# Patient Record
Sex: MALE | Race: WHITE | NOT HISPANIC OR LATINO | Employment: FULL TIME
[De-identification: names, ages, dates, MRNs, and addresses within clinical notes are randomized per-mention and may not be internally consistent; named-entity substitution may affect disease eponyms.]

---

## 2017-01-10 RX ORDER — ATORVASTATIN CALCIUM 10 MG/1
TABLET, FILM COATED ORAL
Qty: 20 TABLET | Refills: 0 | Status: SHIPPED | OUTPATIENT
Start: 2017-01-10

## 2017-01-10 NOTE — TELEPHONE ENCOUNTER
Cholesterol Medications  Protocol Criteria:  · Appointment scheduled in the past 12 months or in the next 3 months  · ALT & LDL on file in the past 12 months  · ALT result < 80  · LDL result <130   Recent Visits       Provider Department Primary Dx    8 mo

## 2017-01-16 ENCOUNTER — PRIOR ORIGINAL RECORDS (OUTPATIENT)
Dept: OTHER | Age: 62
End: 2017-01-16

## 2017-03-03 ENCOUNTER — OFFICE VISIT (OUTPATIENT)
Dept: FAMILY MEDICINE CLINIC | Facility: CLINIC | Age: 62
End: 2017-03-03

## 2017-03-03 VITALS
HEART RATE: 82 BPM | BODY MASS INDEX: 25.73 KG/M2 | RESPIRATION RATE: 20 BRPM | OXYGEN SATURATION: 98 % | TEMPERATURE: 98 F | WEIGHT: 190 LBS | DIASTOLIC BLOOD PRESSURE: 70 MMHG | HEIGHT: 72 IN | SYSTOLIC BLOOD PRESSURE: 110 MMHG

## 2017-03-03 DIAGNOSIS — J01.00 ACUTE MAXILLARY SINUSITIS, RECURRENCE NOT SPECIFIED: Primary | ICD-10-CM

## 2017-03-03 PROCEDURE — 99213 OFFICE O/P EST LOW 20 MIN: CPT | Performed by: NURSE PRACTITIONER

## 2017-03-03 RX ORDER — DOXYCYCLINE HYCLATE 100 MG/1
100 CAPSULE ORAL 2 TIMES DAILY
Qty: 20 CAPSULE | Refills: 0 | Status: SHIPPED | OUTPATIENT
Start: 2017-03-03 | End: 2017-03-13

## 2017-03-03 NOTE — PROGRESS NOTES
CHIEF COMPLAINT:   Patient presents with:  Sinus Problem      HPI:   Yolande Shone is a 59year old male who presents for cold symptoms for  2  weeks. Symptoms have progressed into sinus congestion and been worsening since onset.  Sinus congestion/pain is lesions  HEAD: atraumatic, normocephalic, +  tenderness on palpation of maxillary sinuses  EYES: conjunctiva clear, EOM intact  EARS: TM's Pearly grey bilaterally.   NOSE: nostrils patent, scacnt nasal mucous, nasal mucosa reddened and swollen bilateral tur plan.  The patient is asked to return if sx's persist or worsen.

## 2017-03-04 ENCOUNTER — TELEPHONE (OUTPATIENT)
Dept: FAMILY MEDICINE CLINIC | Facility: CLINIC | Age: 62
End: 2017-03-04

## 2017-03-04 RX ORDER — LEVOFLOXACIN 750 MG/1
750 TABLET ORAL DAILY
Qty: 5 TABLET | Refills: 0 | Status: SHIPPED | OUTPATIENT
Start: 2017-03-04 | End: 2017-03-04

## 2017-03-04 RX ORDER — LEVOFLOXACIN 750 MG/1
750 TABLET ORAL DAILY
Qty: 5 TABLET | Refills: 0 | Status: ON HOLD | OUTPATIENT
Start: 2017-03-04 | End: 2018-07-25

## 2017-09-24 ENCOUNTER — HOSPITAL (OUTPATIENT)
Dept: OTHER | Age: 62
End: 2017-09-24
Attending: EMERGENCY MEDICINE

## 2017-09-28 ENCOUNTER — HOSPITAL (OUTPATIENT)
Dept: OTHER | Age: 62
End: 2017-09-28
Attending: INTERNAL MEDICINE

## 2017-09-28 ENCOUNTER — PRIOR ORIGINAL RECORDS (OUTPATIENT)
Dept: OTHER | Age: 62
End: 2017-09-28

## 2017-09-28 LAB
ALBUMIN SERPL-MCNC: 3.9 GM/DL (ref 3.6–5.1)
ALBUMIN/GLOB SERPL: 1.3 {RATIO} (ref 1–2.4)
ALBUMIN: 3.9 G/DL
ALKALINE PHOSPHATATE(ALK PHOS): 74 IU/L
ALP SERPL-CCNC: 74 UNIT/L (ref 45–117)
ALT (SGPT): 49 U/L
ALT SERPL-CCNC: 49 UNIT/L
ANION GAP SERPL CALC-SCNC: 13 MMOL/L (ref 10–20)
AST (SGOT): 20 U/L
AST SERPL-CCNC: 20 UNIT/L
BILIRUB SERPL-MCNC: 2.2 MG/DL (ref 0.2–1)
BILIRUBIN TOTAL: 2.2 MG/DL
BUN SERPL-MCNC: 16 MG/DL (ref 6–20)
BUN/CREAT SERPL: 19 (ref 7–25)
BUN: 16 MG/DL
CALCIUM SERPL-MCNC: 8.9 MG/DL (ref 8.4–10.2)
CALCIUM: 8.9 MG/DL
CHLORIDE: 106 MEQ/L
CHLORIDE: 106 MMOL/L (ref 98–107)
CHOLEST SERPL-MCNC: 131 MG/DL
CHOLEST/HDLC SERPL: 2.3 {RATIO}
CHOLESTEROL, TOTAL: 131 MG/DL
CO2 SERPL-SCNC: 30 MMOL/L (ref 21–32)
CREAT SERPL-MCNC: 0.84 MG/DL (ref 0.67–1.17)
CREATININE, SERUM: 0.84 MG/DL
GLOBULIN SER-MCNC: 3.1 GM/DL (ref 2–4)
GLOBULIN: 3.1 G/DL
GLUCOSE SERPL-MCNC: 107 MG/DL (ref 65–99)
GLUCOSE: 107 MG/DL
GLUCOSE: 107 MG/DL
HDL CHOLESTEROL: 57 MG/DL
HDLC SERPL-MCNC: 57 MG/DL
LDL CHOLESTEROL: 50 MG/DL
LDLC SERPL CALC-MCNC: 50 MG/DL
LENGTH OF FAST TIME PATIENT: ABNORMAL HR
LENGTH OF FAST TIME PATIENT: NORMAL HR
NONHDLC SERPL-MCNC: 74 MG/DL
POTASSIUM SERPL-SCNC: 4.1 MMOL/L (ref 3.4–5.1)
POTASSIUM, SERUM: 4.1 MEQ/L
PROT SERPL-MCNC: 7 GM/DL (ref 6.4–8.2)
PROTEIN, TOTAL: 7 G/DL
SGOT (AST): 20 IU/L
SGPT (ALT): 49 IU/L
SODIUM SERPL-SCNC: 145 MMOL/L (ref 135–145)
SODIUM: 145 MEQ/L
TRIGLYCERIDE (TRIGP): 120 MG/DL
TRIGLYCERIDES: 120 MG/DL

## 2017-10-11 ENCOUNTER — PRIOR ORIGINAL RECORDS (OUTPATIENT)
Dept: OTHER | Age: 62
End: 2017-10-11

## 2017-11-30 ENCOUNTER — OFFICE VISIT (OUTPATIENT)
Dept: FAMILY MEDICINE CLINIC | Facility: CLINIC | Age: 62
End: 2017-11-30

## 2017-11-30 VITALS
TEMPERATURE: 98 F | HEIGHT: 72 IN | HEART RATE: 86 BPM | RESPIRATION RATE: 20 BRPM | WEIGHT: 195 LBS | BODY MASS INDEX: 26.41 KG/M2 | OXYGEN SATURATION: 98 % | SYSTOLIC BLOOD PRESSURE: 128 MMHG | DIASTOLIC BLOOD PRESSURE: 84 MMHG

## 2017-11-30 DIAGNOSIS — J40 BRONCHITIS: ICD-10-CM

## 2017-11-30 DIAGNOSIS — J01.00 ACUTE MAXILLARY SINUSITIS, RECURRENCE NOT SPECIFIED: Primary | ICD-10-CM

## 2017-11-30 PROCEDURE — 99213 OFFICE O/P EST LOW 20 MIN: CPT | Performed by: NURSE PRACTITIONER

## 2017-11-30 RX ORDER — BENZONATATE 200 MG/1
200 CAPSULE ORAL 3 TIMES DAILY PRN
Qty: 20 CAPSULE | Refills: 0 | Status: ON HOLD | OUTPATIENT
Start: 2017-11-30 | End: 2018-07-25

## 2017-11-30 RX ORDER — ALBUTEROL SULFATE 90 UG/1
AEROSOL, METERED RESPIRATORY (INHALATION)
Qty: 1 INHALER | Refills: 0 | Status: SHIPPED | OUTPATIENT
Start: 2017-11-30 | End: 2019-03-22

## 2017-11-30 RX ORDER — DOXYCYCLINE HYCLATE 100 MG
100 TABLET ORAL 2 TIMES DAILY
Qty: 20 TABLET | Refills: 0 | Status: SHIPPED | OUTPATIENT
Start: 2017-11-30 | End: 2018-06-12

## 2017-11-30 NOTE — PROGRESS NOTES
CHIEF COMPLAINT:   Patient presents with:  Sinus Problem      HPI:   Anival Blanton is a 59year old male who presents for cold symptoms for  10  days. Symptoms have progressed into sinus congestion and been worsening since onset.  Sinus congestion/pain is Glasses of wine: 1 per week     Comment: beer and wine socially        REVIEW OF SYSTEMS:   GENERAL:  Denies loss of appetite  SKIN: no rashes or abnormal skin lesions  HEENT: See HPI.     LUNGS: denies shortness of breath or wheezing, See HPI  CARDIOVASCUL 0      Si puffs every 4-6 hours as needed      Doxycycline Hyclate 100 MG Oral Tab 20 tablet 0      Sig: Take 1 tablet (100 mg total) by mouth 2 (two) times daily. Risks, benefits, side effects of medication addressed and explained.     There

## 2017-12-12 ENCOUNTER — TELEPHONE (OUTPATIENT)
Dept: FAMILY MEDICINE CLINIC | Facility: CLINIC | Age: 62
End: 2017-12-12

## 2017-12-12 RX ORDER — LEVOFLOXACIN 500 MG/1
500 TABLET, FILM COATED ORAL DAILY
Qty: 10 TABLET | Refills: 0 | Status: SHIPPED | OUTPATIENT
Start: 2017-12-12 | End: 2017-12-22

## 2017-12-12 NOTE — TELEPHONE ENCOUNTER
Called and relates that after completing Doxycycline for Sinusitis. Leonel di not help . Requested Levaquin. Will order.   Instructed that if this doesn't help to see PCM

## 2018-06-02 ENCOUNTER — OFFICE VISIT (OUTPATIENT)
Dept: FAMILY MEDICINE CLINIC | Facility: CLINIC | Age: 63
End: 2018-06-02

## 2018-06-02 VITALS
DIASTOLIC BLOOD PRESSURE: 86 MMHG | RESPIRATION RATE: 20 BRPM | HEIGHT: 72 IN | WEIGHT: 195 LBS | HEART RATE: 84 BPM | OXYGEN SATURATION: 98 % | BODY MASS INDEX: 26.41 KG/M2 | SYSTOLIC BLOOD PRESSURE: 136 MMHG | TEMPERATURE: 98 F

## 2018-06-02 DIAGNOSIS — J01.00 ACUTE MAXILLARY SINUSITIS, RECURRENCE NOT SPECIFIED: Primary | ICD-10-CM

## 2018-06-02 PROBLEM — J01.90 ACUTE SINUSITIS: Status: ACTIVE | Noted: 2018-06-02

## 2018-06-02 PROCEDURE — 99213 OFFICE O/P EST LOW 20 MIN: CPT | Performed by: NURSE PRACTITIONER

## 2018-06-02 RX ORDER — AZITHROMYCIN 250 MG/1
250 TABLET, FILM COATED ORAL DAILY
Qty: 6 TABLET | Refills: 0 | Status: SHIPPED | OUTPATIENT
Start: 2018-06-02 | End: 2018-06-12

## 2018-06-02 NOTE — PROGRESS NOTES
CHIEF COMPLAINT:   Patient presents with:  Sinus Problem      HPI:   Nehal Oconnor is a 57year old male who presents for cold symptoms for  10  days. Symptoms have progressed into sinus congestion and been worsening since onset.  Sinus congestion/pain is oz/week     Glasses of wine: 1 per week     Comment: beer and wine socially        REVIEW OF SYSTEMS:   GENERAL:  denies  diminished appetite  SKIN: no rashes or abnormal skin lesions  HEENT: See HPI.     LUNGS: denies shortness of breath or wheezing, See H understanding of these issues and agrees to the plan. The patient is asked to f/u with PCP if sx's persist or worsen.

## 2018-06-09 ENCOUNTER — TELEPHONE (OUTPATIENT)
Dept: FAMILY MEDICINE CLINIC | Facility: CLINIC | Age: 63
End: 2018-06-09

## 2018-06-09 NOTE — TELEPHONE ENCOUNTER
Pt requesting 2nd round of abx: explained to pateint that abx given has long half life and at this time is too early for another abx. Pt agrees with this plan.

## 2018-06-11 ENCOUNTER — TELEPHONE (OUTPATIENT)
Dept: FAMILY MEDICINE CLINIC | Facility: CLINIC | Age: 63
End: 2018-06-11

## 2018-06-11 RX ORDER — AZITHROMYCIN 250 MG/1
250 TABLET, FILM COATED ORAL DAILY
Qty: 6 TABLET | Refills: 0 | Status: SHIPPED | OUTPATIENT
Start: 2018-06-11 | End: 2018-06-12

## 2018-06-11 NOTE — TELEPHONE ENCOUNTER
Came by and relates that although the sinus congestion improved that symptoms returned. Assured that it sometimes (in the past) take (2) doses of Azithromycin. Will comply. Encouraged to see [facility].

## 2018-06-12 ENCOUNTER — OFFICE VISIT (OUTPATIENT)
Dept: FAMILY MEDICINE CLINIC | Facility: CLINIC | Age: 63
End: 2018-06-12

## 2018-06-12 VITALS
DIASTOLIC BLOOD PRESSURE: 81 MMHG | SYSTOLIC BLOOD PRESSURE: 129 MMHG | TEMPERATURE: 98 F | HEART RATE: 85 BPM | WEIGHT: 199 LBS | BODY MASS INDEX: 27 KG/M2

## 2018-06-12 DIAGNOSIS — J01.01 ACUTE RECURRENT MAXILLARY SINUSITIS: Primary | ICD-10-CM

## 2018-06-12 PROCEDURE — 99212 OFFICE O/P EST SF 10 MIN: CPT | Performed by: FAMILY MEDICINE

## 2018-06-12 PROCEDURE — 99213 OFFICE O/P EST LOW 20 MIN: CPT | Performed by: FAMILY MEDICINE

## 2018-06-12 RX ORDER — METHYLPREDNISOLONE 4 MG/1
TABLET ORAL
Qty: 1 PACKAGE | Refills: 0 | Status: ON HOLD | OUTPATIENT
Start: 2018-06-12 | End: 2018-07-25

## 2018-06-12 NOTE — PROGRESS NOTES
HPI:    Patient ID: Catrina Bangura is a 57year old male. HPI  Patient presents with:  Sinusitis: sinus, meds given relieves sinus, comes back after done with meds, turns into cough, with head pressure       Review of Systems   Constitutional: Negative. recurrent sinusitis. No orders of the defined types were placed in this encounter. Meds This Visit:  Signed Prescriptions Disp Refills    methylPREDNISolone (MEDROL) 4 MG Oral Tablet Therapy Pack 1 Package 0      Sig: As directed.            Imaging &

## 2018-06-22 ENCOUNTER — NURSE TRIAGE (OUTPATIENT)
Dept: OTHER | Age: 63
End: 2018-06-22

## 2018-06-22 ENCOUNTER — OFFICE VISIT (OUTPATIENT)
Dept: FAMILY MEDICINE CLINIC | Facility: CLINIC | Age: 63
End: 2018-06-22

## 2018-06-22 VITALS
TEMPERATURE: 98 F | DIASTOLIC BLOOD PRESSURE: 79 MMHG | BODY MASS INDEX: 27 KG/M2 | HEART RATE: 83 BPM | WEIGHT: 199 LBS | SYSTOLIC BLOOD PRESSURE: 128 MMHG

## 2018-06-22 DIAGNOSIS — R31.0 GROSS HEMATURIA: ICD-10-CM

## 2018-06-22 DIAGNOSIS — R35.0 FREQUENCY OF URINATION: Primary | ICD-10-CM

## 2018-06-22 DIAGNOSIS — Z12.5 PROSTATE CANCER SCREENING: ICD-10-CM

## 2018-06-22 PROCEDURE — 99212 OFFICE O/P EST SF 10 MIN: CPT | Performed by: FAMILY MEDICINE

## 2018-06-22 PROCEDURE — 81002 URINALYSIS NONAUTO W/O SCOPE: CPT | Performed by: FAMILY MEDICINE

## 2018-06-22 PROCEDURE — 99214 OFFICE O/P EST MOD 30 MIN: CPT | Performed by: FAMILY MEDICINE

## 2018-06-22 RX ORDER — CIPROFLOXACIN 500 MG/1
500 TABLET, FILM COATED ORAL 2 TIMES DAILY
Qty: 14 TABLET | Refills: 0 | Status: ON HOLD | OUTPATIENT
Start: 2018-06-22 | End: 2018-07-25

## 2018-06-22 NOTE — TELEPHONE ENCOUNTER
Action Requested: Summary for Provider     []  Critical Lab, Recommendations Needed  [] Need Additional Advice  []   FYI    []   Need Orders  [] Need Medications Sent to Pharmacy  []  Other     SUMMARY: appt scheduled today. Onset this morning.  Pt woke up

## 2018-06-22 NOTE — PROGRESS NOTES
HPI:    Patient ID: Nehal Oconnor is a 57year old male. HPI  Patient presents with:  Bleeding: blood in urine, frequent urination, started this morning   No significant pain with urination. No back or abdominal pain.   Review of Systems   Constitutiona urination  (primary encounter diagnosis)  Gross hematuria  Prostate cancer screening    Will send for culture and emperic treatment with cipro.   He will schedule a urogram.     Orders Placed This Encounter      POC Urinalysis, Manual Dip without microscopy

## 2018-06-28 ENCOUNTER — LAB ENCOUNTER (OUTPATIENT)
Dept: LAB | Facility: HOSPITAL | Age: 63
End: 2018-06-28
Attending: FAMILY MEDICINE
Payer: COMMERCIAL

## 2018-06-28 ENCOUNTER — HOSPITAL ENCOUNTER (OUTPATIENT)
Dept: CT IMAGING | Facility: HOSPITAL | Age: 63
Discharge: HOME | End: 2018-06-28
Attending: FAMILY MEDICINE
Payer: COMMERCIAL

## 2018-06-28 DIAGNOSIS — R31.0 GROSS HEMATURIA: ICD-10-CM

## 2018-06-28 DIAGNOSIS — Z12.5 PROSTATE CANCER SCREENING: ICD-10-CM

## 2018-06-28 LAB — CREAT BLD-MCNC: 0.9 MG/DL (ref 0.5–1.5)

## 2018-06-28 PROCEDURE — 36415 COLL VENOUS BLD VENIPUNCTURE: CPT

## 2018-06-28 PROCEDURE — 85025 COMPLETE CBC W/AUTO DIFF WBC: CPT

## 2018-06-28 PROCEDURE — 80048 BASIC METABOLIC PNL TOTAL CA: CPT

## 2018-06-28 PROCEDURE — 82565 ASSAY OF CREATININE: CPT

## 2018-06-28 PROCEDURE — 74178 CT ABD&PLV WO CNTR FLWD CNTR: CPT | Performed by: FAMILY MEDICINE

## 2018-07-25 ENCOUNTER — EXTERNAL RECORD (OUTPATIENT)
Dept: HEALTH INFORMATION MANAGEMENT | Facility: OTHER | Age: 63
End: 2018-07-25

## 2018-07-25 ENCOUNTER — APPOINTMENT (OUTPATIENT)
Dept: MRI IMAGING | Facility: HOSPITAL | Age: 63
End: 2018-07-25
Attending: EMERGENCY MEDICINE
Payer: COMMERCIAL

## 2018-07-25 ENCOUNTER — HOSPITAL ENCOUNTER (OUTPATIENT)
Facility: HOSPITAL | Age: 63
Setting detail: OBSERVATION
Discharge: HOME | End: 2018-07-26
Attending: EMERGENCY MEDICINE | Admitting: HOSPITALIST
Payer: COMMERCIAL

## 2018-07-25 DIAGNOSIS — G45.4 TGA (TRANSIENT GLOBAL AMNESIA): Primary | ICD-10-CM

## 2018-07-25 LAB
ANION GAP SERPL CALC-SCNC: 6 MMOL/L (ref 0–18)
BASOPHILS # BLD: 0 K/UL (ref 0–0.2)
BASOPHILS NFR BLD: 0 %
BILIRUB UR QL: NEGATIVE
BUN SERPL-MCNC: 16 MG/DL (ref 8–20)
BUN/CREAT SERPL: 16.5 (ref 10–20)
CALCIUM SERPL-MCNC: 9 MG/DL (ref 8.5–10.5)
CHLORIDE SERPL-SCNC: 112 MMOL/L (ref 95–110)
CLARITY UR: CLEAR
CO2 SERPL-SCNC: 25 MMOL/L (ref 22–32)
COLOR UR: YELLOW
CREAT SERPL-MCNC: 0.97 MG/DL (ref 0.5–1.5)
EOSINOPHIL # BLD: 0.1 K/UL (ref 0–0.7)
EOSINOPHIL NFR BLD: 1 %
ERYTHROCYTE [DISTWIDTH] IN BLOOD BY AUTOMATED COUNT: 14.3 % (ref 11–15)
GLUCOSE SERPL-MCNC: 101 MG/DL (ref 70–99)
GLUCOSE UR-MCNC: NEGATIVE MG/DL
HCT VFR BLD AUTO: 44.2 % (ref 41–52)
HGB BLD-MCNC: 15.3 G/DL (ref 13.5–17.5)
KETONES UR-MCNC: NEGATIVE MG/DL
LEUKOCYTE ESTERASE UR QL STRIP.AUTO: NEGATIVE
LYMPHOCYTES # BLD: 1.5 K/UL (ref 1–4)
LYMPHOCYTES NFR BLD: 20 %
MCH RBC QN AUTO: 31.9 PG (ref 27–32)
MCHC RBC AUTO-ENTMCNC: 34.7 G/DL (ref 32–37)
MCV RBC AUTO: 91.9 FL (ref 80–100)
MONOCYTES # BLD: 0.6 K/UL (ref 0–1)
MONOCYTES NFR BLD: 9 %
NEUTROPHILS # BLD AUTO: 5.2 K/UL (ref 1.8–7.7)
NEUTROPHILS NFR BLD: 70 %
NITRITE UR QL STRIP.AUTO: NEGATIVE
OSMOLALITY UR CALC.SUM OF ELEC: 297 MOSM/KG (ref 275–295)
PH UR: 7 [PH] (ref 5–8)
PLATELET # BLD AUTO: 202 K/UL (ref 140–400)
PMV BLD AUTO: 8.8 FL (ref 7.4–10.3)
POTASSIUM SERPL-SCNC: 3.9 MMOL/L (ref 3.3–5.1)
PROT UR-MCNC: NEGATIVE MG/DL
RBC # BLD AUTO: 4.81 M/UL (ref 4.5–5.9)
RBC #/AREA URNS AUTO: 3 /HPF
SODIUM SERPL-SCNC: 143 MMOL/L (ref 136–144)
SP GR UR STRIP: 1.02 (ref 1–1.03)
UROBILINOGEN UR STRIP-ACNC: <2
VIT C UR-MCNC: NEGATIVE MG/DL
WBC # BLD AUTO: 7.5 K/UL (ref 4–11)
WBC #/AREA URNS AUTO: 2 /HPF

## 2018-07-25 PROCEDURE — 70551 MRI BRAIN STEM W/O DYE: CPT | Performed by: EMERGENCY MEDICINE

## 2018-07-25 PROCEDURE — 99244 OFF/OP CNSLTJ NEW/EST MOD 40: CPT | Performed by: OTHER

## 2018-07-25 PROCEDURE — 99220 INITIAL OBSERVATION CARE,LEVL III: CPT | Performed by: HOSPITALIST

## 2018-07-25 RX ORDER — SENNOSIDES 8.6 MG
17.2 TABLET ORAL NIGHTLY
Status: DISCONTINUED | OUTPATIENT
Start: 2018-07-25 | End: 2018-07-26

## 2018-07-25 RX ORDER — ATORVASTATIN CALCIUM 10 MG/1
10 TABLET, FILM COATED ORAL NIGHTLY
Status: DISCONTINUED | OUTPATIENT
Start: 2018-07-25 | End: 2018-07-26

## 2018-07-25 RX ORDER — ONDANSETRON 2 MG/ML
4 INJECTION INTRAMUSCULAR; INTRAVENOUS EVERY 6 HOURS PRN
Status: DISCONTINUED | OUTPATIENT
Start: 2018-07-25 | End: 2018-07-26

## 2018-07-25 RX ORDER — ZOLPIDEM TARTRATE 5 MG/1
5 TABLET ORAL NIGHTLY PRN
Status: DISCONTINUED | OUTPATIENT
Start: 2018-07-25 | End: 2018-07-26

## 2018-07-25 RX ORDER — DOCUSATE SODIUM 100 MG/1
100 CAPSULE, LIQUID FILLED ORAL 2 TIMES DAILY
Status: DISCONTINUED | OUTPATIENT
Start: 2018-07-25 | End: 2018-07-26

## 2018-07-25 RX ORDER — ONDANSETRON 2 MG/ML
4 INJECTION INTRAMUSCULAR; INTRAVENOUS EVERY 6 HOURS PRN
Status: DISCONTINUED | OUTPATIENT
Start: 2018-07-25 | End: 2018-07-25

## 2018-07-25 RX ORDER — 0.9 % SODIUM CHLORIDE 0.9 %
VIAL (ML) INJECTION
Status: COMPLETED
Start: 2018-07-25 | End: 2018-07-25

## 2018-07-25 RX ORDER — METOCLOPRAMIDE HYDROCHLORIDE 5 MG/ML
10 INJECTION INTRAMUSCULAR; INTRAVENOUS EVERY 8 HOURS PRN
Status: DISCONTINUED | OUTPATIENT
Start: 2018-07-25 | End: 2018-07-26

## 2018-07-25 RX ORDER — OMEPRAZOLE 20 MG/1
20 CAPSULE, DELAYED RELEASE ORAL
COMMUNITY
End: 2019-02-12 | Stop reason: CLARIF

## 2018-07-25 RX ORDER — SODIUM CHLORIDE 0.9 % (FLUSH) 0.9 %
3 SYRINGE (ML) INJECTION AS NEEDED
Status: DISCONTINUED | OUTPATIENT
Start: 2018-07-25 | End: 2018-07-26

## 2018-07-25 RX ORDER — CLOPIDOGREL BISULFATE 75 MG/1
75 TABLET ORAL DAILY
Status: DISCONTINUED | OUTPATIENT
Start: 2018-07-26 | End: 2018-07-26

## 2018-07-25 RX ORDER — ACETAMINOPHEN 325 MG/1
650 TABLET ORAL EVERY 6 HOURS PRN
Status: DISCONTINUED | OUTPATIENT
Start: 2018-07-25 | End: 2018-07-25

## 2018-07-25 RX ORDER — POLYETHYLENE GLYCOL 3350 17 G/17G
17 POWDER, FOR SOLUTION ORAL DAILY PRN
Status: DISCONTINUED | OUTPATIENT
Start: 2018-07-25 | End: 2018-07-26

## 2018-07-25 RX ORDER — ASPIRIN 81 MG/1
162 TABLET ORAL DAILY
Status: ON HOLD | COMMUNITY
End: 2018-07-26

## 2018-07-25 RX ORDER — SODIUM PHOSPHATE, DIBASIC AND SODIUM PHOSPHATE, MONOBASIC 7; 19 G/133ML; G/133ML
1 ENEMA RECTAL ONCE AS NEEDED
Status: DISCONTINUED | OUTPATIENT
Start: 2018-07-25 | End: 2018-07-26

## 2018-07-25 RX ORDER — PANTOPRAZOLE SODIUM 20 MG/1
20 TABLET, DELAYED RELEASE ORAL
Status: DISCONTINUED | OUTPATIENT
Start: 2018-07-26 | End: 2018-07-26

## 2018-07-25 RX ORDER — ACETAMINOPHEN 650 MG/1
650 SUPPOSITORY RECTAL EVERY 4 HOURS PRN
Status: DISCONTINUED | OUTPATIENT
Start: 2018-07-25 | End: 2018-07-26

## 2018-07-25 RX ORDER — CLOPIDOGREL BISULFATE 75 MG/1
75 TABLET ORAL DAILY
Status: DISCONTINUED | OUTPATIENT
Start: 2018-07-25 | End: 2018-07-25

## 2018-07-25 RX ORDER — ACETAMINOPHEN 325 MG/1
650 TABLET ORAL EVERY 4 HOURS PRN
Status: DISCONTINUED | OUTPATIENT
Start: 2018-07-25 | End: 2018-07-26

## 2018-07-25 RX ORDER — BISACODYL 10 MG
10 SUPPOSITORY, RECTAL RECTAL
Status: DISCONTINUED | OUTPATIENT
Start: 2018-07-25 | End: 2018-07-26

## 2018-07-25 NOTE — ED INITIAL ASSESSMENT (HPI)
Pt states he had a period of time this am at work which he doesn't remember. States he sent emails that he doesn't remember sending. Time period from 0093-2465. Symptoms have resolved now.

## 2018-07-25 NOTE — ED PROVIDER NOTES
Patient Seen in: [facility] AND [facility] Emergency Department    History   Patient presents with:  Altered Mental Status (neurologic)    Stated Complaint: confusion this am, symptoms resolved    HPI    73-year-old male presents for evaluation of confusion.   Pa range of motion. Neck supple. Cardiovascular: Normal rate, regular rhythm, normal heart sounds and intact distal pulses. Pulmonary/Chest: Effort normal and breath sounds normal. No respiratory distress. Abdominal: Soft.  Bowel sounds are normal. Ther admitted to Dr. Bia Washington. Patient and wife updated at the bedside.       Disposition and Plan     Clinical Impression:  TGA (transient global amnesia)  (primary encounter diagnosis)    Disposition:  Admit  7/25/2018  5:24 pm    Follow-up:  No follow-up pro

## 2018-07-25 NOTE — ED NOTES
Pt states he has a period of 10-15 minutes he has no recollection of. States about 11:30am, he received replies to emails that he had sent at between 9:00- 9:30am, but did not remember sending. States emails were coherent and made sense.  A co worker told h

## 2018-07-26 ENCOUNTER — PRIOR ORIGINAL RECORDS (OUTPATIENT)
Dept: OTHER | Age: 63
End: 2018-07-26

## 2018-07-26 ENCOUNTER — APPOINTMENT (OUTPATIENT)
Dept: ULTRASOUND IMAGING | Facility: HOSPITAL | Age: 63
End: 2018-07-26
Attending: HOSPITALIST
Payer: COMMERCIAL

## 2018-07-26 ENCOUNTER — APPOINTMENT (OUTPATIENT)
Dept: CV DIAGNOSTICS | Facility: HOSPITAL | Age: 63
End: 2018-07-26
Attending: HOSPITALIST
Payer: COMMERCIAL

## 2018-07-26 VITALS
WEIGHT: 199.69 LBS | HEART RATE: 73 BPM | BODY MASS INDEX: 24 KG/M2 | SYSTOLIC BLOOD PRESSURE: 159 MMHG | OXYGEN SATURATION: 97 % | RESPIRATION RATE: 18 BRPM | DIASTOLIC BLOOD PRESSURE: 95 MMHG | TEMPERATURE: 98 F

## 2018-07-26 LAB
ANION GAP SERPL CALC-SCNC: 6 MMOL/L (ref 0–18)
BASOPHILS # BLD: 0 K/UL (ref 0–0.2)
BASOPHILS NFR BLD: 0 %
BUN SERPL-MCNC: 12 MG/DL (ref 8–20)
BUN/CREAT SERPL: 13.3 (ref 10–20)
CALCIUM SERPL-MCNC: 9 MG/DL (ref 8.5–10.5)
CHLORIDE SERPL-SCNC: 106 MMOL/L (ref 95–110)
CHOLEST SERPL-MCNC: 123 MG/DL (ref 110–200)
CO2 SERPL-SCNC: 28 MMOL/L (ref 22–32)
CREAT SERPL-MCNC: 0.9 MG/DL (ref 0.5–1.5)
EOSINOPHIL # BLD: 0.1 K/UL (ref 0–0.7)
EOSINOPHIL NFR BLD: 1 %
ERYTHROCYTE [DISTWIDTH] IN BLOOD BY AUTOMATED COUNT: 14.1 % (ref 11–15)
GLUCOSE SERPL-MCNC: 112 MG/DL (ref 70–99)
HBA1C MFR BLD: 5.3 % (ref 4–6)
HBA1C MFR BLD: 5.3 % (ref 4–6)
HCT VFR BLD AUTO: 43.7 % (ref 41–52)
HDLC SERPL-MCNC: 49 MG/DL
HGB BLD-MCNC: 15.4 G/DL (ref 13.5–17.5)
LDLC SERPL CALC-MCNC: 56 MG/DL (ref 0–99)
LYMPHOCYTES # BLD: 1.4 K/UL (ref 1–4)
LYMPHOCYTES NFR BLD: 25 %
MCH RBC QN AUTO: 32.4 PG (ref 27–32)
MCHC RBC AUTO-ENTMCNC: 35.2 G/DL (ref 32–37)
MCV RBC AUTO: 92.2 FL (ref 80–100)
MONOCYTES # BLD: 0.5 K/UL (ref 0–1)
MONOCYTES NFR BLD: 9 %
NEUTROPHILS # BLD AUTO: 3.7 K/UL (ref 1.8–7.7)
NEUTROPHILS NFR BLD: 64 %
NONHDLC SERPL-MCNC: 74 MG/DL
OSMOLALITY UR CALC.SUM OF ELEC: 291 MOSM/KG (ref 275–295)
PLATELET # BLD AUTO: 187 K/UL (ref 140–400)
PMV BLD AUTO: 8.5 FL (ref 7.4–10.3)
POTASSIUM SERPL-SCNC: 4.4 MMOL/L (ref 3.3–5.1)
RBC # BLD AUTO: 4.74 M/UL (ref 4.5–5.9)
SODIUM SERPL-SCNC: 140 MMOL/L (ref 136–144)
TRIGL SERPL-MCNC: 91 MG/DL (ref 1–149)
WBC # BLD AUTO: 5.7 K/UL (ref 4–11)

## 2018-07-26 PROCEDURE — 99217 OBSERVATION CARE DISCHARGE: CPT | Performed by: HOSPITALIST

## 2018-07-26 PROCEDURE — 93306 TTE W/DOPPLER COMPLETE: CPT | Performed by: HOSPITALIST

## 2018-07-26 PROCEDURE — 93880 EXTRACRANIAL BILAT STUDY: CPT | Performed by: HOSPITALIST

## 2018-07-26 PROCEDURE — 99226 SUBSEQUENT OBSERVATION CARE: CPT | Performed by: OTHER

## 2018-07-26 RX ORDER — CLOPIDOGREL BISULFATE 75 MG/1
75 TABLET ORAL DAILY
Qty: 30 TABLET | Refills: 0 | Status: SHIPPED | OUTPATIENT
Start: 2018-07-27

## 2018-07-26 RX ORDER — LISINOPRIL 5 MG/1
5 TABLET ORAL DAILY
Status: DISCONTINUED | OUTPATIENT
Start: 2018-07-26 | End: 2018-07-26

## 2018-07-26 RX ORDER — LISINOPRIL 5 MG/1
5 TABLET ORAL DAILY
Qty: 30 TABLET | Refills: 0 | Status: SHIPPED | OUTPATIENT
Start: 2018-07-27 | End: 2018-12-27 | Stop reason: ALTCHOICE

## 2018-07-26 NOTE — PHYSICAL THERAPY NOTE
PHYSICAL THERAPY EVALUATION - INPATIENT     Room Number: 347/347-A  Evaluation Date: 7/26/2018  Type of Evaluation: New  Physician Order: PT Eval and Treat    Presenting Problem: TGA vs. TIA  Reason for Therapy: Mobility Dysfunction and Discharge Planning adjusting bedclothes, sheets and blankets)?: None   -   Sitting down on and standing up from a chair with arms (e.g., wheelchair, bedside commode, etc.): None   -   Moving from lying on back to sitting on the side of the bed?: None   How much help from ano

## 2018-07-26 NOTE — PLAN OF CARE
Problem: Patient/Family Goals  Goal: Patient/Family Short Term Goal  Patient's Short Term Goal: go home     Interventions:   - US of carotids and ECHO completed, adequate for discharge  - See additional Care Plan goals for specific interventions   Outcome:

## 2018-07-26 NOTE — CONSULTS
[facility]    PATIENT'S NAME: Kristy Kimble BLU   ATTENDING PHYSICIAN: Agata Bacon MD   CONSULTING PHYSICIAN: Sharona Farley MD   PATIENT ACCOUNT#:   [id number]    LOCATION:  [address] [place] [address] Avenue #:   [id number]       DATE OF BIRTH: Cranial nerves III through VII and IX through XII are normal.  Strength in the arms and legs is normal.  Alternating motion rates are normal.  No pronator drift. No carotid bruits. LABORATORY DATA:  Labs were reviewed.   Normal chemistry group, except

## 2018-07-26 NOTE — DISCHARGE SUMMARY
[place] HOSPITALIST  DISCHARGE SUMMARY     Agata Peters Patient Status:  Observation    1955 MRN [id number]   Location [facility] 3W/SW Attending Yamileth Rodgers MD   Hosp Day # 0 PCP Thom Kaminski DO     Date of Admission: / START taking these medications      Instructions Prescription details   Clopidogrel Bisulfate 75 MG Tabs  Commonly known as:  PLAVIX  Start taking on:  7/27/2018      Take 1 tablet (75 mg total) by mouth daily.    Quantity:  30 tablet  Refills:  0     lis Abdomen: Soft, nontender, nondistended. Positive bowel sounds. No rebound or guarding. Neurologic: No focal neurological deficits. Musculoskeletal: Moves all extremities. Extremities: No edema.   ------------------------------------------------------

## 2018-07-26 NOTE — OCCUPATIONAL THERAPY NOTE
OT orders received, chart reviewed. Pt is on observation status. DANIEL Corey cleared pt for OT evaluation. Pt found up walking around. Pt reports he is able to toilet himself and complete his ADLs independently.  Pt is at baseline and his symptoms have reso

## 2018-07-26 NOTE — PROGRESS NOTES
[facility]    Progress Note    Chandler Zunilda Patient Status:  Observation    1955 MRN [id number]   Location [facility] 3W/SW Attending Mika Irving MD   Hosp Day # 0 PCP Aida Amato DO       Subjective:   Ragini Hernandez Bisulfate (PLAVIX) tab 75 mg 75 mg Oral Daily   Normal Saline Flush 0.9 % injection 3 mL 3 mL Intravenous PRN   Zolpidem Tartrate (AMBIEN) tab 5 mg 5 mg Oral Nightly PRN   atorvastatin (LIPITOR) tab 10 mg 10 mg Oral Nightly   Pantoprazole Sodium (PROTONIX) -------------------------- Sinus Rhythm WITHIN NORMAL LIMITS No previous ECGs available Electronically signed on 07/25/2018 at 18:26 by Dulce Mott MD, MD  7/26/2018

## 2018-07-26 NOTE — CM/SW NOTE
SW received an MD order for discharge planning. Sw met with pt and his wife at bedside. [facility] with one stoop to enter and 10 stairs to access 2nd floor bedrooms. Pt lives with his wife who will be available to assist him upon discharge.

## 2018-07-26 NOTE — SLP NOTE
Orders rec'd per stroke order set. Chart reviewed. Imaging negative for any acute changes at this time. Pt passed RN swallow screen and is tolerating general diet without any reported concerns. SLP services not deemed appropriate at this time.  Will sign-of

## 2018-07-26 NOTE — H&P
[facility]    PATIENT'S NAME: Gallo Nathaniel HURT   ATTENDING PHYSICIAN: Ted Lackey MD   PATIENT ACCOUNT#:   [id number]    LOCATION:  [address] [place] [address] RECORD #:   [id number]       YOB: 1955  ADMISSION DATE:       07/25/2018 distress. VITAL SIGNS:  Temperature 98.0, pulse 70, respiratory rate 18, blood pressure 156/107, pulse oximetry 96% on room air. HEENT:  Atraumatic. Oropharynx clear. Moist mucous membranes. Normal hard and soft palate. NECK:  Trachea midline.   Full

## 2018-07-27 ENCOUNTER — PRIOR ORIGINAL RECORDS (OUTPATIENT)
Dept: OTHER | Age: 63
End: 2018-07-27

## 2018-07-27 ENCOUNTER — PATIENT OUTREACH (OUTPATIENT)
Dept: CASE MANAGEMENT | Age: 63
End: 2018-07-27

## 2018-07-27 LAB
BUN: 12 MG/DL
CALCIUM: 9 MG/DL
CHLORIDE: 106 MEQ/L
CHOLESTEROL, TOTAL: 123 MG/DL
CHOLESTEROL, TOTAL: 123 MG/DL
CREATININE, SERUM: 0.9 MG/DL
GLUCOSE: 112 MG/DL
GLUCOSE: 112 MG/DL
HDL CHOLESTEROL: 49 MG/DL
HDL CHOLESTEROL: 49 MG/DL
HEMATOCRIT: 43.7 %
HEMOGLOBIN A1C: 5.3 %
HEMOGLOBIN: 15.4 G/DL
LDL CHOLESTEROL: 56 MG/DL
LDL CHOLESTEROL: 56 MG/DL
MCH: 32.4 PG
MCHC: 35.2 G/DL
MCV: 92.2 FL
NON-HDL CHOLESTEROL: 74 MG/DL
NON-HDL CHOLESTEROL: 74 MG/DL
PLATELETS: 187 K/UL
POTASSIUM, SERUM: 4.4 MEQ/L
RED BLOOD COUNT: 4.74 X 10-6/U
SODIUM: 140 MEQ/L
TRIGLYCERIDES: 91 MG/DL
TRIGLYCERIDES: 91 MG/DL
WHITE BLOOD COUNT: 5.7 X 10-3/U

## 2018-07-30 NOTE — PROGRESS NOTES
TCM OUTREACH    Call made to attempt to reach patient for TCM follow up. No answer, Voicemail left requesting call back at [phone].     Book by: 8/9/18    (Triage Team if pt returns call please transfer to ext [phone])

## 2018-08-15 ENCOUNTER — PRIOR ORIGINAL RECORDS (OUTPATIENT)
Dept: OTHER | Age: 63
End: 2018-08-15

## 2018-08-16 ENCOUNTER — PRIOR ORIGINAL RECORDS (OUTPATIENT)
Dept: OTHER | Age: 63
End: 2018-08-16

## 2018-12-05 ENCOUNTER — PRIOR ORIGINAL RECORDS (OUTPATIENT)
Dept: OTHER | Age: 63
End: 2018-12-05

## 2018-12-05 ENCOUNTER — MYAURORA ACCOUNT LINK (OUTPATIENT)
Dept: OTHER | Age: 63
End: 2018-12-05

## 2018-12-27 ENCOUNTER — OFFICE VISIT (OUTPATIENT)
Dept: INTERNAL MEDICINE CLINIC | Facility: CLINIC | Age: 63
End: 2018-12-27
Payer: COMMERCIAL

## 2018-12-27 VITALS
SYSTOLIC BLOOD PRESSURE: 133 MMHG | HEART RATE: 92 BPM | HEIGHT: 72 IN | BODY MASS INDEX: 27.9 KG/M2 | DIASTOLIC BLOOD PRESSURE: 92 MMHG | TEMPERATURE: 98 F | WEIGHT: 206 LBS

## 2018-12-27 DIAGNOSIS — J01.00 ACUTE NON-RECURRENT MAXILLARY SINUSITIS: ICD-10-CM

## 2018-12-27 DIAGNOSIS — K21.9 GASTROESOPHAGEAL REFLUX DISEASE WITHOUT ESOPHAGITIS: Primary | ICD-10-CM

## 2018-12-27 PROCEDURE — 99213 OFFICE O/P EST LOW 20 MIN: CPT | Performed by: INTERNAL MEDICINE

## 2018-12-27 PROCEDURE — 99212 OFFICE O/P EST SF 10 MIN: CPT | Performed by: INTERNAL MEDICINE

## 2018-12-27 RX ORDER — GUAIFENESIN AND CODEINE PHOSPHATE 100; 10 MG/5ML; MG/5ML
5 SOLUTION ORAL 2 TIMES DAILY PRN
Qty: 118 ML | Refills: 0 | Status: SHIPPED | OUTPATIENT
Start: 2018-12-27 | End: 2019-01-10

## 2018-12-27 RX ORDER — IRBESARTAN 75 MG/1
75 TABLET ORAL DAILY
Refills: 2 | COMMUNITY
Start: 2018-12-05 | End: 2019-11-09

## 2018-12-27 NOTE — PROGRESS NOTES
Gerardo Song is a 70year old male. Patient presents with:  Referral: for gastro- for acid reflux causing cough   Sinus Problem: Sinus issues since Sunday, possible infection.        HPI:   She comes as an urgent visit  PCP Dr. Jessica Dewitt  C/c GERD- was upp history on file. Social History:  Social History    Tobacco Use      Smoking status: Never Smoker      Smokeless tobacco: Never Used    Alcohol use:  Yes      Alcohol/week: 0.6 oz      Types: 1 Glasses of wine per week      Comment: beer and wine socially for the [employer]    The patient indicates understanding of these issues and agrees to the plan. No Follow-up on file.

## 2018-12-27 NOTE — PATIENT INSTRUCTIONS
Sinusitis (No Antibiotics)    The sinuses are air-filled spaces within the bones of the face. They connect to the inside of the nose. Sinusitis is an inflammation of the tissue that lines the sinuses.  Sinusitis can occur during a cold. It can also happen · Use acetaminophen or ibuprofen to control pain, unless another pain medicine was prescribed to you. If you have chronic liver or kidney disease or ever had a stomach ulcer, talk with your healthcare provider before using these medicines.  (Aspirin should Do what you can to avoid being exposed to colds and flu. When possible, take more time to rest when you feel something “coming on.”  · Wash your hands often. This is especially important during cold and flu season. Try not to touch your face.   · As much as Sinusitis can often be managed with self-care. Self-care can keep sinuses moist and make you feel more comfortable. Remember to follow your doctor's instructions closely. This can make a big difference in getting your sinus problem under control.   Drink fl

## 2019-01-12 ENCOUNTER — HOSPITAL (OUTPATIENT)
Dept: OTHER | Age: 64
End: 2019-01-12
Attending: EMERGENCY MEDICINE

## 2019-01-12 ENCOUNTER — DIAGNOSTIC TRANS (OUTPATIENT)
Dept: OTHER | Age: 64
End: 2019-01-12

## 2019-01-12 LAB
ANALYZER ANC (IANC): NORMAL
ANION GAP SERPL CALC-SCNC: 14 MMOL/L (ref 10–20)
BASOPHILS # BLD: 0 THOUSAND/MCL (ref 0–0.3)
BASOPHILS NFR BLD: 0 %
BUN SERPL-MCNC: 20 MG/DL (ref 6–20)
BUN/CREAT SERPL: 24 (ref 7–25)
CALCIUM SERPL-MCNC: 8.8 MG/DL (ref 8.4–10.2)
CHLORIDE: 106 MMOL/L (ref 98–107)
CO2 SERPL-SCNC: 26 MMOL/L (ref 21–32)
CREAT SERPL-MCNC: 0.85 MG/DL (ref 0.67–1.17)
DIFFERENTIAL METHOD BLD: NORMAL
EOSINOPHIL # BLD: 0.1 THOUSAND/MCL (ref 0.1–0.5)
EOSINOPHIL NFR BLD: 1 %
ERYTHROCYTE [DISTWIDTH] IN BLOOD: 12.4 % (ref 11–15)
GLUCOSE SERPL-MCNC: 156 MG/DL (ref 65–99)
HEMATOCRIT: 41.9 % (ref 39–51)
HGB BLD-MCNC: 14.2 GM/DL (ref 13–17)
IMM GRANULOCYTES # BLD AUTO: 0 THOUSAND/MCL (ref 0–0.2)
IMM GRANULOCYTES NFR BLD: 0 %
LYMPHOCYTES # BLD: 1.3 THOUSAND/MCL (ref 1–4)
LYMPHOCYTES NFR BLD: 21 %
MCH RBC QN AUTO: 31.1 PG (ref 26–34)
MCHC RBC AUTO-ENTMCNC: 33.9 GM/DL (ref 32–36.5)
MCV RBC AUTO: 91.9 FL (ref 78–100)
MONOCYTES # BLD: 0.5 THOUSAND/MCL (ref 0.3–0.9)
MONOCYTES NFR BLD: 8 %
NEUTROPHILS # BLD: 4.1 THOUSAND/MCL (ref 1.8–7.7)
NEUTROPHILS NFR BLD: 70 %
NEUTS SEG NFR BLD: NORMAL %
NRBC (NRBCRE): 0 /100 WBC
PLATELET # BLD: 203 THOUSAND/MCL (ref 140–450)
POTASSIUM SERPL-SCNC: 4.3 MMOL/L (ref 3.4–5.1)
RBC # BLD: 4.56 MILLION/MCL (ref 4.5–5.9)
SODIUM SERPL-SCNC: 142 MMOL/L (ref 135–145)
TROPONIN I SERPL HS-MCNC: <0.02 NG/ML
WBC # BLD: 5.9 THOUSAND/MCL (ref 4.2–11)

## 2019-02-12 ENCOUNTER — OFFICE VISIT (OUTPATIENT)
Dept: FAMILY MEDICINE CLINIC | Facility: CLINIC | Age: 64
End: 2019-02-12
Payer: COMMERCIAL

## 2019-02-12 VITALS
BODY MASS INDEX: 27.22 KG/M2 | WEIGHT: 201 LBS | HEIGHT: 72 IN | SYSTOLIC BLOOD PRESSURE: 147 MMHG | DIASTOLIC BLOOD PRESSURE: 96 MMHG | HEART RATE: 76 BPM | TEMPERATURE: 98 F

## 2019-02-12 DIAGNOSIS — N41.0 ACUTE PROSTATITIS: ICD-10-CM

## 2019-02-12 DIAGNOSIS — R31.29 MICROHEMATURIA: ICD-10-CM

## 2019-02-12 DIAGNOSIS — R35.0 FREQUENT URINATION: Primary | ICD-10-CM

## 2019-02-12 LAB
APPEARANCE: CLEAR
GLUCOSE (URINE DIPSTICK): NEGATIVE MG/DL
KETONES (URINE DIPSTICK): NEGATIVE MG/DL
LEUKOCYTES: NEGATIVE
MULTISTIX LOT#: NORMAL NUMERIC
NITRITE, URINE: NEGATIVE
PH, URINE: 6 (ref 4.5–8)
PROTEIN (URINE DIPSTICK): NEGATIVE MG/DL
SPECIFIC GRAVITY: 1.02 (ref 1–1.03)
URINE-COLOR: YELLOW
UROBILINOGEN,SEMI-QN: NEGATIVE MG/DL (ref 0–1.9)

## 2019-02-12 PROCEDURE — 99212 OFFICE O/P EST SF 10 MIN: CPT | Performed by: FAMILY MEDICINE

## 2019-02-12 PROCEDURE — 99214 OFFICE O/P EST MOD 30 MIN: CPT | Performed by: FAMILY MEDICINE

## 2019-02-12 PROCEDURE — 81002 URINALYSIS NONAUTO W/O SCOPE: CPT | Performed by: FAMILY MEDICINE

## 2019-02-12 RX ORDER — PANTOPRAZOLE SODIUM 40 MG/1
40 TABLET, DELAYED RELEASE ORAL
Qty: 30 TABLET | Refills: 1 | Status: SHIPPED | OUTPATIENT
Start: 2019-02-12 | End: 2019-05-21

## 2019-02-12 RX ORDER — CIPROFLOXACIN 500 MG/1
500 TABLET, FILM COATED ORAL 2 TIMES DAILY
Qty: 14 TABLET | Refills: 1 | Status: SHIPPED | OUTPATIENT
Start: 2019-02-12 | End: 2019-03-22

## 2019-02-12 NOTE — PROGRESS NOTES
HPI:    Patient ID: Víctor Persaud is a 70year old male. HPI  Patient presents with:  UTI: frequent urnination started saturday, no burning sensation,   History of 4mm stone in the past year. Review of Systems   Constitutional: Negative.     Azra Queen urologist.   Orders Placed This Encounter      POC Urinalysis, Manual Dip without microscopy [[id number]]      Meds This Visit:  Requested Prescriptions     Signed Prescriptions Disp Refills   • Ciprofloxacin HCl 500 MG Oral Tab 14 tablet 1     Sig: Take 1 tabl

## 2019-02-19 ENCOUNTER — HOSPITAL ENCOUNTER (OUTPATIENT)
Dept: CT IMAGING | Facility: HOSPITAL | Age: 64
Discharge: HOME | End: 2019-02-19
Attending: FAMILY MEDICINE
Payer: COMMERCIAL

## 2019-02-19 DIAGNOSIS — R31.29 MICROHEMATURIA: ICD-10-CM

## 2019-02-19 PROCEDURE — 74176 CT ABD & PELVIS W/O CONTRAST: CPT | Performed by: FAMILY MEDICINE

## 2019-02-25 ENCOUNTER — TELEPHONE (OUTPATIENT)
Dept: FAMILY MEDICINE CLINIC | Facility: CLINIC | Age: 64
End: 2019-02-25

## 2019-02-25 DIAGNOSIS — R31.29 MICROHEMATURIA: Primary | ICD-10-CM

## 2019-02-25 NOTE — TELEPHONE ENCOUNTER
Please let patient know that I ordered a follow up urinalysis. If this continues to show microscopic blood then I am going to recommend follow up with a urologist just to be sure everything is alright.  ( we have spoken about his CT results already and I r

## 2019-02-26 ENCOUNTER — PATIENT MESSAGE (OUTPATIENT)
Dept: TELEHEALTH | Age: 64
End: 2019-02-26

## 2019-02-26 NOTE — TELEPHONE ENCOUNTER
Pt called back and he was inform of Dr. Romy Chang message below and he verbalized understanding he will have it done today but he will still like for  to recommend a Urologist in the [place] group.  Who do you recommend   Pt does not need a referral

## 2019-02-26 NOTE — TELEPHONE ENCOUNTER
LMTCB=transfer to triage, please give Dr Michael Palm office number [phone] top the patient. MyChart sent today.

## 2019-02-27 ENCOUNTER — TELEPHONE (OUTPATIENT)
Dept: SURGERY | Facility: CLINIC | Age: 64
End: 2019-02-27

## 2019-02-28 VITALS
HEART RATE: 80 BPM | SYSTOLIC BLOOD PRESSURE: 136 MMHG | WEIGHT: 195 LBS | BODY MASS INDEX: 26.41 KG/M2 | DIASTOLIC BLOOD PRESSURE: 96 MMHG | RESPIRATION RATE: 16 BRPM | HEIGHT: 72 IN

## 2019-02-28 VITALS
HEIGHT: 72 IN | RESPIRATION RATE: 16 BRPM | HEART RATE: 68 BPM | BODY MASS INDEX: 26.93 KG/M2 | WEIGHT: 198.8 LBS | SYSTOLIC BLOOD PRESSURE: 144 MMHG | DIASTOLIC BLOOD PRESSURE: 92 MMHG

## 2019-02-28 VITALS
WEIGHT: 203 LBS | BODY MASS INDEX: 27.5 KG/M2 | DIASTOLIC BLOOD PRESSURE: 90 MMHG | SYSTOLIC BLOOD PRESSURE: 136 MMHG | HEIGHT: 72 IN | HEART RATE: 72 BPM | RESPIRATION RATE: 16 BRPM

## 2019-03-12 ENCOUNTER — APPOINTMENT (OUTPATIENT)
Dept: LAB | Age: 64
End: 2019-03-12
Attending: FAMILY MEDICINE
Payer: COMMERCIAL

## 2019-03-12 DIAGNOSIS — R31.29 MICROHEMATURIA: ICD-10-CM

## 2019-03-12 LAB
BILIRUB UR QL: NEGATIVE
CLARITY UR: CLEAR
COLOR UR: YELLOW
GLUCOSE UR-MCNC: NEGATIVE MG/DL
HGB UR QL STRIP.AUTO: NEGATIVE
KETONES UR-MCNC: NEGATIVE MG/DL
LEUKOCYTE ESTERASE UR QL STRIP.AUTO: NEGATIVE
NITRITE UR QL STRIP.AUTO: NEGATIVE
PH UR: 7 [PH] (ref 5–8)
PROT UR-MCNC: NEGATIVE MG/DL
SP GR UR STRIP: 1.02 (ref 1–1.03)
UROBILINOGEN UR STRIP-ACNC: <2
VIT C UR-MCNC: NEGATIVE MG/DL

## 2019-03-12 PROCEDURE — 87086 URINE CULTURE/COLONY COUNT: CPT

## 2019-03-12 PROCEDURE — 81003 URINALYSIS AUTO W/O SCOPE: CPT

## 2019-03-12 NOTE — TELEPHONE ENCOUNTER
Spoke with patient, rescheduled for sooner appt with . PT accepted appt. PT also requested to be left on wait list incase any sooner appts open up.      Future Appointments   Date Time Provider Marcel Miller   4/11/2019  3:00 PM Sarahi Rebolledo

## 2019-03-20 ENCOUNTER — TELEPHONE (OUTPATIENT)
Dept: FAMILY MEDICINE CLINIC | Facility: CLINIC | Age: 64
End: 2019-03-20

## 2019-03-20 ENCOUNTER — HOSPITAL (OUTPATIENT)
Dept: OTHER | Age: 64
End: 2019-03-20
Attending: EMERGENCY MEDICINE

## 2019-03-20 LAB
AMORPH SED URNS QL MICRO: ABNORMAL
ANALYZER ANC (IANC): NORMAL
ANION GAP SERPL CALC-SCNC: 17 MMOL/L (ref 10–20)
APPEARANCE UR: CLEAR
APPEARANCE UR: CLEAR
BACTERIA #/AREA URNS HPF: ABNORMAL /HPF
BASOPHILS # BLD: 0 THOUSAND/MCL (ref 0–0.3)
BASOPHILS NFR BLD: 1 %
BILIRUB UR QL STRIP: ABNORMAL
BILIRUB UR QL: NEGATIVE
BUN SERPL-MCNC: 15 MG/DL (ref 6–20)
BUN/CREAT SERPL: 13 (ref 7–25)
CALCIUM SERPL-MCNC: 8.8 MG/DL (ref 8.4–10.2)
CAOX CRY URNS QL MICRO: ABNORMAL
CHLORIDE: 103 MMOL/L (ref 98–107)
CO2 SERPL-SCNC: 27 MMOL/L (ref 21–32)
COLOR UR: YELLOW
COLOR UR: YELLOW
CREAT SERPL-MCNC: 1.16 MG/DL (ref 0.67–1.17)
DIFFERENTIAL METHOD BLD: NORMAL
EOSINOPHIL # BLD: 0.1 THOUSAND/MCL (ref 0.1–0.5)
EOSINOPHIL NFR BLD: 1 %
EPITH CASTS #/AREA URNS LPF: ABNORMAL /[LPF]
ERYTHROCYTE [DISTWIDTH] IN BLOOD: 12.1 % (ref 11–15)
FATTY CASTS #/AREA URNS LPF: ABNORMAL /[LPF]
GLUCOSE SERPL-MCNC: 103 MG/DL (ref 65–99)
GLUCOSE UR STRIP-MCNC: NEGATIVE MG/DL
GLUCOSE UR-MCNC: NEGATIVE MG/DL
GRAN CASTS #/AREA URNS LPF: ABNORMAL /[LPF]
HEMATOCRIT: 44 % (ref 39–51)
HEMOCCULT STL QL: ABNORMAL
HGB BLD-MCNC: 15.2 GM/DL (ref 13–17)
HGB UR QL: ABNORMAL
HYALINE CASTS #/AREA URNS LPF: ABNORMAL /LPF (ref 0–5)
IMM GRANULOCYTES # BLD AUTO: 0 THOUSAND/MCL (ref 0–0.2)
IMM GRANULOCYTES NFR BLD: 0 %
KETONES UR STRIP-MCNC: ABNORMAL MG/DL
KETONES UR-MCNC: ABNORMAL MG/DL
LEUKOCYTE ESTERASE UR QL STRIP: ABNORMAL
LEUKOCYTE ESTERASE UR QL STRIP: NEGATIVE
LYMPHOCYTES # BLD: 2.8 THOUSAND/MCL (ref 1–4)
LYMPHOCYTES NFR BLD: 36 %
MCH RBC QN AUTO: 31.4 PG (ref 26–34)
MCHC RBC AUTO-ENTMCNC: 34.5 GM/DL (ref 32–36.5)
MCV RBC AUTO: 90.9 FL (ref 78–100)
MIXED CELL CASTS #/AREA URNS LPF: ABNORMAL /[LPF]
MONOCYTES # BLD: 0.8 THOUSAND/MCL (ref 0.3–0.9)
MONOCYTES NFR BLD: 10 %
MUCOUS THREADS URNS QL MICRO: PRESENT
NEUTROPHILS # BLD: 4.1 THOUSAND/MCL (ref 1.8–7.7)
NEUTROPHILS NFR BLD: 52 %
NEUTS SEG NFR BLD: NORMAL %
NITRITE UR QL STRIP: NEGATIVE
NITRITE UR QL: NEGATIVE
NRBC (NRBCRE): 0 /100 WBC
PH UR STRIP: 5.5 UNIT (ref 5–7)
PH UR: 5 UNIT (ref 5–7)
PLATELET # BLD: 246 THOUSAND/MCL (ref 140–450)
POTASSIUM SERPL-SCNC: 3.5 MMOL/L (ref 3.4–5.1)
PROT UR QL: NEGATIVE MG/DL
PROT UR STRIP-MCNC: 30 MG/DL
RBC # BLD: 4.84 MILLION/MCL (ref 4.5–5.9)
RBC #/AREA URNS HPF: ABNORMAL /HPF (ref 0–3)
RBC CASTS #/AREA URNS LPF: ABNORMAL /[LPF]
RENAL EPI CELLS #/AREA URNS HPF: ABNORMAL /[HPF]
SODIUM SERPL-SCNC: 143 MMOL/L (ref 135–145)
SP GR UR STRIP: 1.02 (ref 1–1.03)
SP GR UR: 1.01 (ref 1–1.03)
SPECIMEN SOURCE: ABNORMAL
SPERM URNS QL MICRO: ABNORMAL
SQUAMOUS #/AREA URNS HPF: ABNORMAL /HPF (ref 0–5)
T VAGINALIS URNS QL MICRO: ABNORMAL
TRI-PHOS CRY URNS QL MICRO: ABNORMAL
URATE CRY URNS QL MICRO: ABNORMAL
URINE REFLEX: ABNORMAL
URNS CMNT MICRO: ABNORMAL
UROBILINOGEN UR QL: 0.2 MG/DL (ref 0–1)
UROBILINOGEN UR STRIP-MCNC: 0.2 MG/DL (ref 0–1)
WAXY CASTS #/AREA URNS LPF: ABNORMAL /[LPF]
WBC # BLD: 7.9 THOUSAND/MCL (ref 4.2–11)
WBC #/AREA URNS HPF: ABNORMAL /HPF (ref 0–5)
WBC CASTS #/AREA URNS LPF: ABNORMAL /[LPF]
YEAST HYPHAE URNS QL MICRO: ABNORMAL
YEAST URNS QL MICRO: ABNORMAL

## 2019-03-20 NOTE — TELEPHONE ENCOUNTER
Spoke with INDER to see if he would be willing to add pt on today but he stated pt should go to the ER for [id number].

## 2019-03-20 NOTE — TELEPHONE ENCOUNTER
Pt called back, message below relayed. He states his pain is increasing and he will likely go to the ER for evaluation. Message also forwarded to urology clinical staff for further triage.

## 2019-03-20 NOTE — TELEPHONE ENCOUNTER
Message noted; Dr Lord Lopez note reviewed. Can contact urology to see if they can see him sooner. If he has sig symptoms like hematuria or flank pains/ fevers, ER or urgent care. Can continue good fluid intake.

## 2019-03-20 NOTE — TELEPHONE ENCOUNTER
Dr Armen Henson for Dr Merlene Porter, please advise. OV 2/12/19. Patient said he has a kidney stone. Sees Urology in mid-April.  Today, discomfort in penis area, passing urine \"all the time\" but \"little comes out,\" no force to the stream. Denied hematuria, fever, n

## 2019-03-21 NOTE — TELEPHONE ENCOUNTER
LMTCB. Patient's chart does not indicate he went to the ER yesterday, 3/20. Patient's appt with urology is 4/11.

## 2019-03-22 NOTE — H&P
Nicole Hathaway 42 NAME: Sanchez Plain A   ATTENDING PHYSICIAN: Kennedy Park MD   PATIENT ACCOUNT#:   [id number]    LOCATION:  [facility]  MEDICAL RECORD #:   [id number]       YOB: 1955  ADMISSION DATE:       03/25/2019 issues. No chest pain. History of hypertension. No pulmonary problems now but may have had asthma in the past.  Uses an inhaler on occasion. He has had no problems with gastrointestinal problems. He has a history of hypertension but no chest pain.   Hi in June 2019 and should get a metabolic stone workup. Have Bactrim prior to the procedure. We will then do a cystoscopy, right retrograde pyelogram, right ureteroscopy, laser lithotripsy, stone removal, and possible stent placement.   I counseled him on s

## 2019-03-22 NOTE — TELEPHONE ENCOUNTER
Patient advised of information below. Patient was seen in ER at [facility] on Tuesday due to extreme pain. They were able to get hin in to see Dr. Diane Gutiérrez the next day.     He does intend to call and cancel his appointment with Dr Julia Irene

## 2019-03-22 NOTE — TELEPHONE ENCOUNTER
Chart reviewed. Patient is scheduled for procedure with Dr. Juan Santamaria on 3/25 at [facility].

## 2019-03-22 NOTE — TELEPHONE ENCOUNTER
Noted. If pt is not going to be seen by Jaswant Yepez, it is recommended that he cancel his consultation appt. that he has scheduled, to avoid \"no show\" status.

## 2019-03-25 ENCOUNTER — ANESTHESIA (OUTPATIENT)
Dept: SURGERY | Facility: HOSPITAL | Age: 64
End: 2019-03-25
Payer: COMMERCIAL

## 2019-03-25 ENCOUNTER — ANESTHESIA EVENT (OUTPATIENT)
Dept: SURGERY | Facility: HOSPITAL | Age: 64
End: 2019-03-25
Payer: COMMERCIAL

## 2019-03-25 ENCOUNTER — HOSPITAL ENCOUNTER (OUTPATIENT)
Facility: HOSPITAL | Age: 64
Setting detail: HOSPITAL OUTPATIENT SURGERY
Discharge: HOME | End: 2019-03-25
Attending: UROLOGY | Admitting: UROLOGY
Payer: COMMERCIAL

## 2019-03-25 ENCOUNTER — APPOINTMENT (OUTPATIENT)
Dept: GENERAL RADIOLOGY | Facility: HOSPITAL | Age: 64
End: 2019-03-25
Attending: UROLOGY
Payer: COMMERCIAL

## 2019-03-25 VITALS
RESPIRATION RATE: 15 BRPM | HEIGHT: 72 IN | OXYGEN SATURATION: 95 % | WEIGHT: 203 LBS | HEART RATE: 63 BPM | TEMPERATURE: 97 F | DIASTOLIC BLOOD PRESSURE: 87 MMHG | SYSTOLIC BLOOD PRESSURE: 133 MMHG | BODY MASS INDEX: 27.5 KG/M2

## 2019-03-25 PROCEDURE — 82365 CALCULUS SPECTROSCOPY: CPT | Performed by: UROLOGY

## 2019-03-25 PROCEDURE — 0T768DZ DILATION OF RIGHT URETER WITH INTRALUMINAL DEVICE, VIA NATURAL OR ARTIFICIAL OPENING ENDOSCOPIC: ICD-10-PCS | Performed by: UROLOGY

## 2019-03-25 PROCEDURE — BT141ZZ FLUOROSCOPY OF KIDNEYS, URETERS AND BLADDER USING LOW OSMOLAR CONTRAST: ICD-10-PCS | Performed by: UROLOGY

## 2019-03-25 PROCEDURE — 88300 SURGICAL PATH GROSS: CPT | Performed by: UROLOGY

## 2019-03-25 PROCEDURE — 0TC68ZZ EXTIRPATION OF MATTER FROM RIGHT URETER, VIA NATURAL OR ARTIFICIAL OPENING ENDOSCOPIC: ICD-10-PCS | Performed by: UROLOGY

## 2019-03-25 DEVICE — STENT URET 6F 28CM ULSMTH: Type: IMPLANTABLE DEVICE | Site: URETER | Status: FUNCTIONAL

## 2019-03-25 RX ORDER — PREDNISONE 20 MG/1
20 TABLET ORAL DAILY
Qty: 5 TABLET | Refills: 1 | Status: SHIPPED | OUTPATIENT
Start: 2019-03-25 | End: 2019-03-25

## 2019-03-25 RX ORDER — PHENAZOPYRIDINE HYDROCHLORIDE 200 MG/1
200 TABLET, FILM COATED ORAL ONCE AS NEEDED
Status: DISCONTINUED | OUTPATIENT
Start: 2019-03-25 | End: 2019-03-25

## 2019-03-25 RX ORDER — MORPHINE SULFATE 4 MG/ML
4 INJECTION, SOLUTION INTRAMUSCULAR; INTRAVENOUS EVERY 10 MIN PRN
Status: DISCONTINUED | OUTPATIENT
Start: 2019-03-25 | End: 2019-03-25

## 2019-03-25 RX ORDER — FAMOTIDINE 20 MG/1
20 TABLET ORAL ONCE
Status: DISCONTINUED | OUTPATIENT
Start: 2019-03-25 | End: 2019-03-25 | Stop reason: HOSPADM

## 2019-03-25 RX ORDER — METOCLOPRAMIDE 10 MG/1
10 TABLET ORAL ONCE
Status: DISCONTINUED | OUTPATIENT
Start: 2019-03-25 | End: 2019-03-25 | Stop reason: HOSPADM

## 2019-03-25 RX ORDER — HYDROCODONE BITARTRATE AND ACETAMINOPHEN 5; 325 MG/1; MG/1
2 TABLET ORAL EVERY 4 HOURS PRN
Status: DISCONTINUED | OUTPATIENT
Start: 2019-03-25 | End: 2019-03-25

## 2019-03-25 RX ORDER — PHENAZOPYRIDINE HYDROCHLORIDE 200 MG/1
200 TABLET, FILM COATED ORAL ONCE AS NEEDED
Status: COMPLETED | OUTPATIENT
Start: 2019-03-25 | End: 2019-03-25

## 2019-03-25 RX ORDER — SODIUM CHLORIDE, SODIUM LACTATE, POTASSIUM CHLORIDE, CALCIUM CHLORIDE 600; 310; 30; 20 MG/100ML; MG/100ML; MG/100ML; MG/100ML
INJECTION, SOLUTION INTRAVENOUS CONTINUOUS
Status: DISCONTINUED | OUTPATIENT
Start: 2019-03-25 | End: 2019-03-25

## 2019-03-25 RX ORDER — MORPHINE SULFATE 4 MG/ML
2 INJECTION, SOLUTION INTRAMUSCULAR; INTRAVENOUS EVERY 10 MIN PRN
Status: DISCONTINUED | OUTPATIENT
Start: 2019-03-25 | End: 2019-03-25

## 2019-03-25 RX ORDER — GENTAMICIN SULFATE 40 MG/ML
INJECTION, SOLUTION INTRAMUSCULAR; INTRAVENOUS AS NEEDED
Status: DISCONTINUED | OUTPATIENT
Start: 2019-03-25 | End: 2019-03-25 | Stop reason: HOSPADM

## 2019-03-25 RX ORDER — MORPHINE SULFATE 10 MG/ML
6 INJECTION, SOLUTION INTRAMUSCULAR; INTRAVENOUS EVERY 10 MIN PRN
Status: DISCONTINUED | OUTPATIENT
Start: 2019-03-25 | End: 2019-03-25

## 2019-03-25 RX ORDER — HYDROCODONE BITARTRATE AND ACETAMINOPHEN 5; 325 MG/1; MG/1
1 TABLET ORAL EVERY 4 HOURS PRN
Status: DISCONTINUED | OUTPATIENT
Start: 2019-03-25 | End: 2019-03-25

## 2019-03-25 RX ORDER — ONDANSETRON 2 MG/ML
4 INJECTION INTRAMUSCULAR; INTRAVENOUS ONCE AS NEEDED
Status: DISCONTINUED | OUTPATIENT
Start: 2019-03-25 | End: 2019-03-25

## 2019-03-25 RX ORDER — HYDROCODONE BITARTRATE AND ACETAMINOPHEN 5; 325 MG/1; MG/1
2 TABLET ORAL AS NEEDED
Status: DISCONTINUED | OUTPATIENT
Start: 2019-03-25 | End: 2019-03-25

## 2019-03-25 RX ORDER — ACETAMINOPHEN 500 MG
1000 TABLET ORAL ONCE
Status: DISCONTINUED | OUTPATIENT
Start: 2019-03-25 | End: 2019-03-25 | Stop reason: HOSPADM

## 2019-03-25 RX ORDER — LIDOCAINE HYDROCHLORIDE 20 MG/ML
JELLY TOPICAL AS NEEDED
Status: DISCONTINUED | OUTPATIENT
Start: 2019-03-25 | End: 2019-03-25 | Stop reason: HOSPADM

## 2019-03-25 RX ORDER — METOCLOPRAMIDE HYDROCHLORIDE 5 MG/ML
INJECTION INTRAMUSCULAR; INTRAVENOUS AS NEEDED
Status: DISCONTINUED | OUTPATIENT
Start: 2019-03-25 | End: 2019-03-25 | Stop reason: SURG

## 2019-03-25 RX ORDER — DEXAMETHASONE SODIUM PHOSPHATE 4 MG/ML
VIAL (ML) INJECTION AS NEEDED
Status: DISCONTINUED | OUTPATIENT
Start: 2019-03-25 | End: 2019-03-25 | Stop reason: SURG

## 2019-03-25 RX ORDER — PHENAZOPYRIDINE HYDROCHLORIDE 95 MG/1
95 TABLET ORAL 3 TIMES DAILY PRN
Qty: 21 TABLET | Refills: 6 | Status: SHIPPED | OUTPATIENT
Start: 2019-03-25 | End: 2019-11-09

## 2019-03-25 RX ORDER — HYDROCODONE BITARTRATE AND ACETAMINOPHEN 5; 325 MG/1; MG/1
1 TABLET ORAL AS NEEDED
Status: DISCONTINUED | OUTPATIENT
Start: 2019-03-25 | End: 2019-03-25

## 2019-03-25 RX ORDER — MIDAZOLAM HYDROCHLORIDE 1 MG/ML
INJECTION INTRAMUSCULAR; INTRAVENOUS AS NEEDED
Status: DISCONTINUED | OUTPATIENT
Start: 2019-03-25 | End: 2019-03-25 | Stop reason: SURG

## 2019-03-25 RX ORDER — NALOXONE HYDROCHLORIDE 0.4 MG/ML
80 INJECTION, SOLUTION INTRAMUSCULAR; INTRAVENOUS; SUBCUTANEOUS AS NEEDED
Status: DISCONTINUED | OUTPATIENT
Start: 2019-03-25 | End: 2019-03-25

## 2019-03-25 RX ORDER — ACETAMINOPHEN 325 MG/1
650 TABLET ORAL EVERY 4 HOURS PRN
Status: DISCONTINUED | OUTPATIENT
Start: 2019-03-25 | End: 2019-03-25

## 2019-03-25 RX ADMIN — DEXAMETHASONE SODIUM PHOSPHATE 4 MG: 4 MG/ML VIAL (ML) INJECTION at 14:39:00

## 2019-03-25 RX ADMIN — METOCLOPRAMIDE HYDROCHLORIDE 10 MG: 5 INJECTION INTRAMUSCULAR; INTRAVENOUS at 14:39:00

## 2019-03-25 RX ADMIN — MIDAZOLAM HYDROCHLORIDE 2 MG: 1 INJECTION INTRAMUSCULAR; INTRAVENOUS at 14:39:00

## 2019-03-25 NOTE — BRIEF OP NOTE
[facility] POST ANESTHESIA CARE UNIT  Brief Op Note       Patients Name: Robbie Hobson  Attending Physician: Thompson Mary MD  Operating Physician: Nam Mesa MD  CSN: [id number]     Location:  OR  MRN: [id number]    Date of Birth: [birth year]

## 2019-03-25 NOTE — INTERVAL H&P NOTE
Pre-op Diagnosis: kidney stone right distal ureteral    The above referenced H&P was reviewed by Candice Mitchell MD on 3/25/2019, the patient was examined and no significant changes have occurred in the patient's condition since the H&P was performed.   I

## 2019-03-25 NOTE — ANESTHESIA POSTPROCEDURE EVALUATION
Patient: Landen Dorantes    Procedure Summary     Date:  03/25/19 Room / Location:  [address] [place] [address] MAIN OR 14 / [address] [place] [address] MAIN OR    Anesthesia Start:  7336 Anesthesia Stop:  7623    Procedures:       CYSTOSCOPY RETROGRADE (Bilateral )      CYSTOSCOPY URETEROSCOPY (Right )

## 2019-03-25 NOTE — ANESTHESIA PROCEDURE NOTES
ANESTHESIA INTUBATION  Urgency: elective      General Information and Staff    Patient location during procedure: OR  Anesthesiologist: Asia Islas MD    Indications and Patient Condition  Indications for airway management: anesthesia  Sedation level

## 2019-03-25 NOTE — BRIEF OP NOTE
Pre-Operative Diagnosis: Left impacted proximal ureteral stone, right impacted proximal ureteral stone, bilateral retained stents, multiple bilateral kidney stones, hematuria, bilateral renal colic, previous history of UTI     Post-Operative Diagnosis: Param

## 2019-03-25 NOTE — ANESTHESIA PREPROCEDURE EVALUATION
Anesthesia PreOp Note    HPI:     Nicole Hernandez is a 70year old male who presents for preoperative consultation requested by: Rebekah Freeman MD    Date of Surgery: 3/25/2019    Procedure(s):  CYSTOSCOPY RETROGRADE  LASER HOLMIUM LITHOTRIPSY  CYSTOSCO Hives/Skin Rash    Family History   Problem Relation Age of Onset   • Cancer Brother         lung (smoker)   • Other (Other) Brother         AAA   • Other (Other) Father         AAA     Social History    Socioeconomic History      Marital status:  Not Asked        Back Care: Not Asked        Exercise: Not Asked        Bike Helmet: Not Asked        Seat Belt: Not Asked        Self-Exams: Not Asked    Social History Narrative      Not on file      Available pre-op labs reviewed.              Vital Sign

## 2019-03-26 NOTE — OPERATIVE REPORT
[facility]    PATIENT'S NAME: Meera HURT   ATTENDING PHYSICIAN: Jose Pan MD   OPERATING PHYSICIAN: Jose Pan MD   PATIENT ACCOUNT#:   [de-identified]    LOCATION:  [address]  MEDICAL RECORD #:   [id number]       [facility] distally. The pain had slightly improved, although he went to the emergency room last week. He comes for the above procedure. I explained to him the procedure, risks, complications. He understood, accepts, and desires to proceed.   His CT scan showed no deflated the balloon and removed the balloon. We then passed up the rigid ureteroscope. There was an impacted yellow stone. This was freed from the wall and then gently grasped with the wire basket and removed in its entirety.   We then removed some clot

## 2019-03-28 LAB
CALCULI MASS: 26 MG
CALCULI NUMBER: 1

## 2019-04-03 ENCOUNTER — MED REC SCAN ONLY (OUTPATIENT)
Dept: FAMILY MEDICINE CLINIC | Facility: CLINIC | Age: 64
End: 2019-04-03

## 2019-04-03 NOTE — PROGRESS NOTES
[facility] limited to [facility] and renal transplantation from 3/24/19, forms put in blue folder, will be sent to scan

## 2019-04-19 RX ORDER — ALBUTEROL SULFATE 90 UG/1
AEROSOL, METERED RESPIRATORY (INHALATION)
COMMUNITY
Start: 2018-07-27 | End: 2020-12-09 | Stop reason: SDUPTHER

## 2019-04-19 RX ORDER — OMEPRAZOLE 20 MG/1
CAPSULE, DELAYED RELEASE ORAL
COMMUNITY
Start: 2018-07-27 | End: 2023-10-02

## 2019-04-19 RX ORDER — IBUPROFEN 600 MG/1
TABLET ORAL
COMMUNITY
Start: 2018-07-27 | End: 2019-07-18 | Stop reason: ALTCHOICE

## 2019-04-19 RX ORDER — ALPRAZOLAM 0.25 MG/1
TABLET ORAL
COMMUNITY
Start: 2018-08-16 | End: 2020-12-09 | Stop reason: SDUPTHER

## 2019-04-19 RX ORDER — CLOPIDOGREL BISULFATE 75 MG/1
TABLET ORAL
COMMUNITY
Start: 2018-08-15 | End: 2019-08-22 | Stop reason: SDUPTHER

## 2019-04-19 RX ORDER — ATORVASTATIN CALCIUM 10 MG/1
TABLET, FILM COATED ORAL
COMMUNITY
Start: 2018-08-15 | End: 2019-10-31 | Stop reason: SDUPTHER

## 2019-04-19 RX ORDER — IRBESARTAN 75 MG/1
TABLET ORAL
COMMUNITY
Start: 2018-12-05 | End: 2023-10-02

## 2019-04-23 ENCOUNTER — TELEPHONE (OUTPATIENT)
Dept: CARDIOLOGY | Age: 64
End: 2019-04-23

## 2019-04-26 RX ORDER — OLMESARTAN MEDOXOMIL 20 MG/1
20 TABLET ORAL DAILY
Qty: 90 TABLET | Refills: 1 | Status: SHIPPED | OUTPATIENT
Start: 2019-04-26 | End: 2023-10-02

## 2019-05-06 ENCOUNTER — TELEPHONE (OUTPATIENT)
Dept: CARDIOLOGY | Age: 64
End: 2019-05-06

## 2019-05-21 ENCOUNTER — OFFICE VISIT (OUTPATIENT)
Dept: FAMILY MEDICINE CLINIC | Facility: CLINIC | Age: 64
End: 2019-05-21
Payer: COMMERCIAL

## 2019-05-21 ENCOUNTER — APPOINTMENT (OUTPATIENT)
Dept: LAB | Age: 64
End: 2019-05-21
Attending: FAMILY MEDICINE
Payer: COMMERCIAL

## 2019-05-21 VITALS
HEIGHT: 72 IN | HEART RATE: 69 BPM | TEMPERATURE: 98 F | WEIGHT: 203 LBS | BODY MASS INDEX: 27.5 KG/M2 | SYSTOLIC BLOOD PRESSURE: 121 MMHG | DIASTOLIC BLOOD PRESSURE: 79 MMHG

## 2019-05-21 DIAGNOSIS — R31.29 MICROHEMATURIA: ICD-10-CM

## 2019-05-21 DIAGNOSIS — E04.9 ENLARGED THYROID: ICD-10-CM

## 2019-05-21 DIAGNOSIS — E04.9 ENLARGED THYROID: Primary | ICD-10-CM

## 2019-05-21 PROCEDURE — 84436 ASSAY OF TOTAL THYROXINE: CPT

## 2019-05-21 PROCEDURE — 36415 COLL VENOUS BLD VENIPUNCTURE: CPT

## 2019-05-21 PROCEDURE — 99212 OFFICE O/P EST SF 10 MIN: CPT | Performed by: FAMILY MEDICINE

## 2019-05-21 PROCEDURE — 99214 OFFICE O/P EST MOD 30 MIN: CPT | Performed by: FAMILY MEDICINE

## 2019-05-21 PROCEDURE — 84443 ASSAY THYROID STIM HORMONE: CPT

## 2019-05-21 RX ORDER — PANTOPRAZOLE SODIUM 40 MG/1
40 TABLET, DELAYED RELEASE ORAL
Qty: 90 TABLET | Refills: 3 | Status: SHIPPED | OUTPATIENT
Start: 2019-05-21 | End: 2020-03-12

## 2019-05-21 RX ORDER — PANTOPRAZOLE SODIUM 40 MG/1
40 TABLET, DELAYED RELEASE ORAL
Qty: 90 TABLET | Refills: 1 | OUTPATIENT
Start: 2019-05-21

## 2019-05-21 NOTE — PROGRESS NOTES
HPI:    Patient ID: Eveline Orozco is a 70year old male.     HPI  Patient presents with:  Medication Request: for pantoprazole  Thyroid Problem: concerns with possible enlarged thyroid   Palpated by his urologist on exam.  Review of Systems   Constitutiona Referrals:  US THYROID ([id number]=[id number])       #[phone]

## 2019-06-04 ENCOUNTER — MED REC SCAN ONLY (OUTPATIENT)
Dept: FAMILY MEDICINE CLINIC | Facility: CLINIC | Age: 64
End: 2019-06-04

## 2019-06-13 ENCOUNTER — LAB ENCOUNTER (OUTPATIENT)
Dept: LAB | Facility: HOSPITAL | Age: 64
End: 2019-06-13
Attending: FAMILY MEDICINE
Payer: COMMERCIAL

## 2019-06-13 ENCOUNTER — HOSPITAL ENCOUNTER (OUTPATIENT)
Dept: ULTRASOUND IMAGING | Facility: HOSPITAL | Age: 64
Discharge: HOME | End: 2019-06-13
Attending: FAMILY MEDICINE
Payer: COMMERCIAL

## 2019-06-13 DIAGNOSIS — Z12.5 SCREENING PSA (PROSTATE SPECIFIC ANTIGEN): Primary | ICD-10-CM

## 2019-06-13 DIAGNOSIS — E04.9 ENLARGED THYROID: ICD-10-CM

## 2019-06-13 DIAGNOSIS — N13.30 HYDRONEPHROSIS: ICD-10-CM

## 2019-06-13 PROCEDURE — 36415 COLL VENOUS BLD VENIPUNCTURE: CPT

## 2019-06-13 PROCEDURE — 76536 US EXAM OF HEAD AND NECK: CPT | Performed by: FAMILY MEDICINE

## 2019-07-18 ENCOUNTER — WALK IN (OUTPATIENT)
Dept: URGENT CARE | Age: 64
End: 2019-07-18

## 2019-07-18 VITALS
TEMPERATURE: 98.1 F | HEART RATE: 68 BPM | WEIGHT: 195 LBS | RESPIRATION RATE: 18 BRPM | BODY MASS INDEX: 26.41 KG/M2 | DIASTOLIC BLOOD PRESSURE: 82 MMHG | HEIGHT: 72 IN | SYSTOLIC BLOOD PRESSURE: 126 MMHG

## 2019-07-18 DIAGNOSIS — B96.89 ACUTE BACTERIAL SINUSITIS: Primary | ICD-10-CM

## 2019-07-18 DIAGNOSIS — J01.90 ACUTE BACTERIAL SINUSITIS: Primary | ICD-10-CM

## 2019-07-18 PROCEDURE — 99214 OFFICE O/P EST MOD 30 MIN: CPT | Performed by: NURSE PRACTITIONER

## 2019-07-18 RX ORDER — DOXYCYCLINE 100 MG/1
100 CAPSULE ORAL EVERY 12 HOURS
Qty: 20 CAPSULE | Refills: 0 | Status: SHIPPED | OUTPATIENT
Start: 2019-07-18 | End: 2019-07-28

## 2019-07-18 RX ORDER — FLUTICASONE PROPIONATE 50 MCG
2 SPRAY, SUSPENSION (ML) NASAL DAILY
Qty: 1 BOTTLE | Refills: 0 | Status: SHIPPED | OUTPATIENT
Start: 2019-07-18 | End: 2023-10-02

## 2019-07-18 ASSESSMENT — ENCOUNTER SYMPTOMS
COUGH: 1
DIZZINESS: 0
EYE ITCHING: 0
FEVER: 0
SORE THROAT: 0
VOMITING: 0

## 2019-08-22 RX ORDER — CLOPIDOGREL BISULFATE 75 MG/1
TABLET ORAL
Qty: 90 TABLET | Refills: 1 | Status: SHIPPED | OUTPATIENT
Start: 2019-08-22 | End: 2020-02-16 | Stop reason: SDUPTHER

## 2019-10-31 ENCOUNTER — TELEPHONE (OUTPATIENT)
Dept: CARDIOLOGY | Age: 64
End: 2019-10-31

## 2019-10-31 DIAGNOSIS — I10 HYPERTENSION, UNSPECIFIED TYPE: ICD-10-CM

## 2019-10-31 DIAGNOSIS — R93.1 AGATSTON CORONARY ARTERY CALCIUM SCORE BETWEEN 100 AND 199: Primary | ICD-10-CM

## 2019-10-31 DIAGNOSIS — E78.00 PURE HYPERCHOLESTEROLEMIA: ICD-10-CM

## 2019-10-31 RX ORDER — ATORVASTATIN CALCIUM 10 MG/1
10 TABLET, FILM COATED ORAL DAILY
Qty: 90 TABLET | Refills: 0 | Status: SHIPPED | OUTPATIENT
Start: 2019-10-31 | End: 2020-01-26 | Stop reason: SDUPTHER

## 2019-11-09 ENCOUNTER — EXTERNAL RECORD (OUTPATIENT)
Dept: HEALTH INFORMATION MANAGEMENT | Facility: OTHER | Age: 64
End: 2019-11-09

## 2019-11-09 ENCOUNTER — APPOINTMENT (OUTPATIENT)
Dept: LAB | Age: 64
End: 2019-11-09
Attending: FAMILY MEDICINE
Payer: COMMERCIAL

## 2019-11-09 ENCOUNTER — OFFICE VISIT (OUTPATIENT)
Dept: FAMILY MEDICINE CLINIC | Facility: CLINIC | Age: 64
End: 2019-11-09
Payer: COMMERCIAL

## 2019-11-09 VITALS
SYSTOLIC BLOOD PRESSURE: 122 MMHG | HEART RATE: 76 BPM | HEIGHT: 72 IN | BODY MASS INDEX: 27.09 KG/M2 | DIASTOLIC BLOOD PRESSURE: 73 MMHG | WEIGHT: 200 LBS

## 2019-11-09 DIAGNOSIS — I10 ESSENTIAL HYPERTENSION: ICD-10-CM

## 2019-11-09 DIAGNOSIS — F41.9 ANXIETY: ICD-10-CM

## 2019-11-09 DIAGNOSIS — R07.2 PRECORDIAL PAIN: ICD-10-CM

## 2019-11-09 DIAGNOSIS — R07.2 PRECORDIAL PAIN: Primary | ICD-10-CM

## 2019-11-09 PROCEDURE — 93010 ELECTROCARDIOGRAM REPORT: CPT | Performed by: FAMILY MEDICINE

## 2019-11-09 PROCEDURE — 99214 OFFICE O/P EST MOD 30 MIN: CPT | Performed by: FAMILY MEDICINE

## 2019-11-09 PROCEDURE — 93005 ELECTROCARDIOGRAM TRACING: CPT

## 2019-11-09 RX ORDER — ALPRAZOLAM 0.5 MG/1
0.5 TABLET ORAL 2 TIMES DAILY PRN
Qty: 20 TABLET | Refills: 0 | Status: SHIPPED | OUTPATIENT
Start: 2019-11-09

## 2019-11-09 RX ORDER — VALSARTAN 160 MG/1
160 TABLET ORAL DAILY
COMMUNITY

## 2019-11-09 NOTE — PROGRESS NOTES
HPI:    Patient ID: Eveline Orozco is a 70year old male. HPI  Patient presents for evaluation of what he concurs as a panic episode. He has his heart racing feels as if his blood pressure is very elevated although he did not take his blood pressure. medication regular. Sounds like panic episodes that occur and give most of the symptoms. He will sit for an ECG today and otherwise will follow up with cardiology December. I encouraged him to try taking alprazolam when he has 1 of these episodes.   And

## 2019-12-03 ENCOUNTER — TELEPHONE (OUTPATIENT)
Dept: CARDIOLOGY | Age: 64
End: 2019-12-03

## 2019-12-05 ENCOUNTER — LAB SERVICES (OUTPATIENT)
Dept: LAB | Age: 64
End: 2019-12-05

## 2019-12-05 DIAGNOSIS — E78.00 PURE HYPERCHOLESTEROLEMIA: ICD-10-CM

## 2019-12-05 DIAGNOSIS — I10 HYPERTENSION, UNSPECIFIED TYPE: ICD-10-CM

## 2019-12-05 DIAGNOSIS — R93.1 AGATSTON CORONARY ARTERY CALCIUM SCORE BETWEEN 100 AND 199: ICD-10-CM

## 2019-12-05 LAB
ALBUMIN SERPL-MCNC: 4.1 G/DL (ref 3.6–5.1)
ALBUMIN/GLOB SERPL: 1.6 {RATIO} (ref 1–2.4)
ALP SERPL-CCNC: 77 UNITS/L (ref 45–117)
ALT SERPL-CCNC: 29 UNITS/L
ANION GAP SERPL CALC-SCNC: 9 MMOL/L (ref 10–20)
AST SERPL-CCNC: 17 UNITS/L
BILIRUB SERPL-MCNC: 2.3 MG/DL (ref 0.2–1)
BUN SERPL-MCNC: 19 MG/DL (ref 6–20)
BUN/CREAT SERPL: 22 (ref 7–25)
CALCIUM SERPL-MCNC: 8.7 MG/DL (ref 8.4–10.2)
CHLORIDE SERPL-SCNC: 110 MMOL/L (ref 98–107)
CHOLEST SERPL-MCNC: 144 MG/DL
CHOLEST/HDLC SERPL: 2.8 {RATIO}
CO2 SERPL-SCNC: 26 MMOL/L (ref 21–32)
CREAT SERPL-MCNC: 0.88 MG/DL (ref 0.67–1.17)
FASTING STATUS PATIENT QL REPORTED: 12 HRS
GLOBULIN SER-MCNC: 2.5 G/DL (ref 2–4)
GLUCOSE SERPL-MCNC: 108 MG/DL (ref 65–99)
HDLC SERPL-MCNC: 52 MG/DL
LDLC SERPL-MCNC: 66 MG/DL
LENGTH OF FAST TIME PATIENT: 12 HRS
NONHDLC SERPL-MCNC: 92 MG/DL
POTASSIUM SERPL-SCNC: 4.2 MMOL/L (ref 3.4–5.1)
PROT SERPL-MCNC: 6.6 G/DL (ref 6.4–8.2)
SODIUM SERPL-SCNC: 141 MMOL/L (ref 135–145)
TRIGL SERPL-MCNC: 132 MG/DL

## 2019-12-05 PROCEDURE — 80061 LIPID PANEL: CPT | Performed by: INTERNAL MEDICINE

## 2019-12-05 PROCEDURE — 36415 COLL VENOUS BLD VENIPUNCTURE: CPT | Performed by: INTERNAL MEDICINE

## 2019-12-05 PROCEDURE — 80053 COMPREHEN METABOLIC PANEL: CPT | Performed by: INTERNAL MEDICINE

## 2019-12-11 ENCOUNTER — OFFICE VISIT (OUTPATIENT)
Dept: CARDIOLOGY | Age: 64
End: 2019-12-11

## 2019-12-11 VITALS
WEIGHT: 203.5 LBS | DIASTOLIC BLOOD PRESSURE: 86 MMHG | BODY MASS INDEX: 27.56 KG/M2 | SYSTOLIC BLOOD PRESSURE: 140 MMHG | HEART RATE: 72 BPM | HEIGHT: 72 IN | RESPIRATION RATE: 16 BRPM

## 2019-12-11 DIAGNOSIS — R06.00 DYSPNEA, UNSPECIFIED TYPE: ICD-10-CM

## 2019-12-11 DIAGNOSIS — R93.1 ELEVATED CORONARY ARTERY CALCIUM SCORE: Primary | ICD-10-CM

## 2019-12-11 DIAGNOSIS — I10 ESSENTIAL HYPERTENSION: ICD-10-CM

## 2019-12-11 DIAGNOSIS — E78.00 PURE HYPERCHOLESTEROLEMIA: ICD-10-CM

## 2019-12-11 PROBLEM — J01.90 ACUTE BACTERIAL SINUSITIS: Status: RESOLVED | Noted: 2019-07-18 | Resolved: 2019-12-11

## 2019-12-11 PROBLEM — B96.89 ACUTE BACTERIAL SINUSITIS: Status: RESOLVED | Noted: 2019-07-18 | Resolved: 2019-12-11

## 2019-12-11 PROCEDURE — 3077F SYST BP >= 140 MM HG: CPT | Performed by: INTERNAL MEDICINE

## 2019-12-11 PROCEDURE — 3079F DIAST BP 80-89 MM HG: CPT | Performed by: INTERNAL MEDICINE

## 2019-12-11 PROCEDURE — 99214 OFFICE O/P EST MOD 30 MIN: CPT | Performed by: INTERNAL MEDICINE

## 2019-12-11 RX ORDER — ESOMEPRAZOLE MAGNESIUM 40 MG/1
40 CAPSULE, DELAYED RELEASE ORAL 2 TIMES DAILY
COMMUNITY
Start: 2019-03-06 | End: 2023-10-02

## 2019-12-11 RX ORDER — VALSARTAN 160 MG/1
160 TABLET ORAL DAILY
COMMUNITY
End: 2020-02-05 | Stop reason: SDUPTHER

## 2019-12-11 RX ORDER — ALPRAZOLAM 0.5 MG/1
0.5 TABLET ORAL 2 TIMES DAILY PRN
COMMUNITY
Start: 2019-11-09

## 2019-12-11 RX ORDER — PANTOPRAZOLE SODIUM 40 MG/1
40 TABLET, DELAYED RELEASE ORAL DAILY
COMMUNITY
Start: 2019-05-21

## 2019-12-11 RX ORDER — CLINDAMYCIN HYDROCHLORIDE 150 MG/1
CAPSULE ORAL
Refills: 0 | COMMUNITY
Start: 2019-09-26 | End: 2023-10-02

## 2019-12-11 SDOH — HEALTH STABILITY: PHYSICAL HEALTH: ON AVERAGE, HOW MANY MINUTES DO YOU ENGAGE IN EXERCISE AT THIS LEVEL?: 30 MIN

## 2019-12-11 SDOH — HEALTH STABILITY: PHYSICAL HEALTH: ON AVERAGE, HOW MANY DAYS PER WEEK DO YOU ENGAGE IN MODERATE TO STRENUOUS EXERCISE (LIKE A BRISK WALK)?: 5 DAYS

## 2019-12-11 SDOH — HEALTH STABILITY: MENTAL HEALTH: HOW OFTEN DO YOU HAVE A DRINK CONTAINING ALCOHOL?: 2-3 TIMES A WEEK

## 2019-12-11 ASSESSMENT — ENCOUNTER SYMPTOMS
HEMOPTYSIS: 0
ALLERGIC/IMMUNOLOGIC COMMENTS: NO NEW FOOD ALLERGIES
WEIGHT GAIN: 0
HEMATOCHEZIA: 0
WEIGHT LOSS: 0
SUSPICIOUS LESIONS: 0
COUGH: 0
FEVER: 0
BRUISES/BLEEDS EASILY: 0
CHILLS: 0

## 2019-12-11 ASSESSMENT — PATIENT HEALTH QUESTIONNAIRE - PHQ9
2. FEELING DOWN, DEPRESSED OR HOPELESS: NOT AT ALL
SUM OF ALL RESPONSES TO PHQ9 QUESTIONS 1 AND 2: 0
SUM OF ALL RESPONSES TO PHQ9 QUESTIONS 1 AND 2: 0
1. LITTLE INTEREST OR PLEASURE IN DOING THINGS: NOT AT ALL

## 2020-01-06 ENCOUNTER — MED REC SCAN ONLY (OUTPATIENT)
Dept: FAMILY MEDICINE CLINIC | Facility: CLINIC | Age: 65
End: 2020-01-06

## 2020-01-27 RX ORDER — ATORVASTATIN CALCIUM 10 MG/1
10 TABLET, FILM COATED ORAL DAILY
Qty: 90 TABLET | Refills: 3 | Status: SHIPPED | OUTPATIENT
Start: 2020-01-27 | End: 2021-01-15

## 2020-02-07 RX ORDER — VALSARTAN 160 MG/1
TABLET ORAL
Qty: 90 TABLET | Refills: 3 | Status: SHIPPED | OUTPATIENT
Start: 2020-02-07 | End: 2020-12-09 | Stop reason: SDUPTHER

## 2020-02-18 RX ORDER — CLOPIDOGREL BISULFATE 75 MG/1
TABLET ORAL
Qty: 90 TABLET | Refills: 0 | Status: SHIPPED | OUTPATIENT
Start: 2020-02-18 | End: 2020-07-07

## 2020-03-05 NOTE — H&P
[address] [address] Gastroenterology                                                                                                  Clinic History and Physical     Pa drug use   - Occupation: Sales  - Lives with spouse  - NSAIDs/ASA use: PRN      History, Medications, Allergies, ROS:      Past Medical History:   Diagnosis Date   • High blood pressure    • Hyperlipidemia       Past Surgical History:   Procedure Lateralit breath  CARDIOVASCULAR:  negative for crushing sub-sternal chest pain  GASTROINTESTINAL:  see HPI  GENITOURINARY:  negative for dysuria or gross hematuria  INTEGUMENT/BREAST:  SKIN:  negative for jaundice   ALLERGIC/IMMUNOLOGIC:  negative for hay fever  EN mediated or r/t non-GI etiology. No hx chronic respiratory problems. I doubt his current medications are causative. We discussed a trial of increased Protonix to 40 mg BID w/ office f/u in 2-3 months to reevaluate.  Could consider EGD though given his curre

## 2020-03-12 ENCOUNTER — OFFICE VISIT (OUTPATIENT)
Dept: GASTROENTEROLOGY | Facility: CLINIC | Age: 65
End: 2020-03-12
Payer: COMMERCIAL

## 2020-03-12 VITALS
SYSTOLIC BLOOD PRESSURE: 149 MMHG | BODY MASS INDEX: 28.17 KG/M2 | HEART RATE: 86 BPM | HEIGHT: 72 IN | DIASTOLIC BLOOD PRESSURE: 82 MMHG | WEIGHT: 208 LBS

## 2020-03-12 DIAGNOSIS — K21.9 GASTROESOPHAGEAL REFLUX DISEASE, ESOPHAGITIS PRESENCE NOT SPECIFIED: Primary | ICD-10-CM

## 2020-03-12 DIAGNOSIS — R05.8 NON-PRODUCTIVE COUGH: ICD-10-CM

## 2020-03-12 PROCEDURE — 99203 OFFICE O/P NEW LOW 30 MIN: CPT | Performed by: NURSE PRACTITIONER

## 2020-03-12 RX ORDER — PANTOPRAZOLE SODIUM 40 MG/1
40 TABLET, DELAYED RELEASE ORAL
Qty: 90 TABLET | Refills: 1 | Status: SHIPPED | OUTPATIENT
Start: 2020-03-12 | End: 2020-05-07

## 2020-05-05 ENCOUNTER — TELEPHONE (OUTPATIENT)
Dept: CARDIOLOGY | Age: 65
End: 2020-05-05

## 2020-05-05 RX ORDER — PANTOPRAZOLE SODIUM 40 MG/1
TABLET, DELAYED RELEASE ORAL
Qty: 90 TABLET | Refills: 0 | OUTPATIENT
Start: 2020-05-05

## 2020-05-05 RX ORDER — LOSARTAN POTASSIUM 100 MG/1
100 TABLET ORAL DAILY
Qty: 90 TABLET | Refills: 3 | Status: SHIPPED | OUTPATIENT
Start: 2020-05-05 | End: 2022-01-14

## 2020-05-05 NOTE — PROGRESS NOTES
KRUPA Germán Tele-visit    Parent/Caregiver verbally consents to a Virtual/Telephone Check-In service on 5/5/2020    Patient understands and accepts financial responsibility for any deductible, co-insurance and/or co-pays associated with this service.     This v PRN     Past Medical History:   Diagnosis Date   • High blood pressure    • Hyperlipidemia       Past Surgical History:   Procedure Laterality Date   • CYSTOSCOPY RETROGRADE Bilateral 3/25/2019    Performed by Viki Florez MD at [address] [place] [address] SKIN:  negative for  rash  ALLERGIC/IMMUNOLOGIC:  negative for hay fever  ENDOCRINE:  negative for cold intolerance and heat intolerance  MUSCULOSKELETAL:  negative for  joint stiffness and joint swelling  BEHAVIOR/PSYCH:  negative for depressed mood Referrals:  None     Please note that this visit was completed using two-way, real-time interactive audio communication.   This has been done in good carroll to provide continuity of care in the best interest of the provider-patient relationship, due to the o

## 2020-05-05 NOTE — TELEPHONE ENCOUNTER
Will route to [facility] as she was the last one to refill medication.

## 2020-05-05 NOTE — TELEPHONE ENCOUNTER
Nursing: I had planned to f/u w/ pt following our last OV however this was likely delayed due to COVID-19-if the patient is available, please schedule him for a follow-up tele-visit this Thursday. We can discuss symptom update and refill Rx at that time.

## 2020-05-05 NOTE — TELEPHONE ENCOUNTER
Contacted and spoke to patient.  He accepted f/u telephone visit on this Thursday, 5/7/2020 at 7:30 am.

## 2020-05-07 ENCOUNTER — VIRTUAL PHONE E/M (OUTPATIENT)
Dept: GASTROENTEROLOGY | Facility: CLINIC | Age: 65
End: 2020-05-07

## 2020-05-07 DIAGNOSIS — K21.9 GASTROESOPHAGEAL REFLUX DISEASE, ESOPHAGITIS PRESENCE NOT SPECIFIED: Primary | ICD-10-CM

## 2020-05-07 DIAGNOSIS — R05.8 NONPRODUCTIVE COUGH: ICD-10-CM

## 2020-05-07 PROCEDURE — 99213 OFFICE O/P EST LOW 20 MIN: CPT | Performed by: NURSE PRACTITIONER

## 2020-05-07 RX ORDER — PANTOPRAZOLE SODIUM 40 MG/1
40 TABLET, DELAYED RELEASE ORAL
Qty: 180 TABLET | Refills: 1 | Status: SHIPPED | OUTPATIENT
Start: 2020-05-07 | End: 2020-12-14

## 2020-05-28 ENCOUNTER — TELEMEDICINE (OUTPATIENT)
Dept: FAMILY MEDICINE CLINIC | Facility: CLINIC | Age: 65
End: 2020-05-28
Payer: COMMERCIAL

## 2020-05-28 DIAGNOSIS — I87.8 VENOUS STASIS OF BOTH LOWER EXTREMITIES: Primary | ICD-10-CM

## 2020-05-28 PROCEDURE — 99214 OFFICE O/P EST MOD 30 MIN: CPT | Performed by: FAMILY MEDICINE

## 2020-05-28 NOTE — PROGRESS NOTES
Virtual Telephone Check-In    Landen Dorantes verbally consents to a Virtual/Telephone Check-In visit on 05/28/20. Patient has been referred to the [facility] website at [email or web address] to review the yearly Consent to Treat document.     Patient Chance Sarmiento

## 2020-06-09 ENCOUNTER — HOSPITAL ENCOUNTER (OUTPATIENT)
Dept: ULTRASOUND IMAGING | Facility: HOSPITAL | Age: 65
Discharge: HOME | End: 2020-06-09
Attending: FAMILY MEDICINE
Payer: COMMERCIAL

## 2020-06-09 DIAGNOSIS — I87.8 VENOUS STASIS OF BOTH LOWER EXTREMITIES: ICD-10-CM

## 2020-06-09 PROCEDURE — 93970 EXTREMITY STUDY: CPT | Performed by: FAMILY MEDICINE

## 2020-07-07 RX ORDER — CLOPIDOGREL BISULFATE 75 MG/1
TABLET ORAL
Qty: 90 TABLET | Refills: 0 | Status: SHIPPED | OUTPATIENT
Start: 2020-07-07 | End: 2020-10-06

## 2020-08-15 ENCOUNTER — HOSPITAL ENCOUNTER (OUTPATIENT)
Age: 65
Discharge: HOME | End: 2020-08-15
Attending: EMERGENCY MEDICINE
Payer: COMMERCIAL

## 2020-08-15 VITALS
BODY MASS INDEX: 26 KG/M2 | RESPIRATION RATE: 18 BRPM | HEART RATE: 71 BPM | TEMPERATURE: 97 F | OXYGEN SATURATION: 99 % | WEIGHT: 190 LBS | DIASTOLIC BLOOD PRESSURE: 98 MMHG | SYSTOLIC BLOOD PRESSURE: 176 MMHG

## 2020-08-15 DIAGNOSIS — Z20.822 ENCOUNTER FOR SCREENING LABORATORY TESTING FOR COVID-19 VIRUS: Primary | ICD-10-CM

## 2020-08-15 PROCEDURE — 86769 SARS-COV-2 COVID-19 ANTIBODY: CPT | Performed by: EMERGENCY MEDICINE

## 2020-08-15 PROCEDURE — 99213 OFFICE O/P EST LOW 20 MIN: CPT

## 2020-08-15 NOTE — ED PROVIDER NOTES
[facility]      History   Patient presents with:  Testing    Stated Complaint: covid     HPI    74 yo male may have been exposed to Karla. He is here seeking testing.  He has no symptoms but was around dustin Rate and Rhythm: Normal rate and regular rhythm. Pulmonary:      Effort: Pulmonary effort is normal. No respiratory distress. Skin:     General: Skin is warm and dry. Capillary Refill: Capillary refill takes less than 2 seconds.    Neurological:

## 2020-08-16 LAB — SARS-COV-2 IGG SERPLBLD QL IA.RAPID: NEGATIVE

## 2020-08-19 LAB — SARS-COV-2 BY PCR: NOT DETECTED

## 2020-10-06 RX ORDER — CLOPIDOGREL BISULFATE 75 MG/1
TABLET ORAL
Qty: 90 TABLET | Refills: 2 | Status: SHIPPED | OUTPATIENT
Start: 2020-10-06 | End: 2021-06-08 | Stop reason: SDUPTHER

## 2020-10-16 ENCOUNTER — TELEPHONE (OUTPATIENT)
Dept: FAMILY MEDICINE CLINIC | Facility: CLINIC | Age: 65
End: 2020-10-16

## 2020-10-16 NOTE — TELEPHONE ENCOUNTER
Good morning Dr. Dejuan Weldon patient asking for a call back he had one more question         Please advise  [phone]

## 2020-10-16 NOTE — TELEPHONE ENCOUNTER
On call physician received page re: Karla exposure. Pt calling to discuss recommendations s/p COVID exposure. Pt went into office on 10/15 for meeting, was in large conference room with  for approx 10mins.  Neither party wearing mask, at times w

## 2020-10-16 NOTE — TELEPHONE ENCOUNTER
Spoke with pt. Inquiring about whether wife plan to see grandchildren (ages 15, 8) next week. Since she had no direct exposure, she does not need to quarantine in the same capacity as the pt.  It was recommended that she and her grandchildren wear masks du

## 2020-11-03 ENCOUNTER — LAB SERVICES (OUTPATIENT)
Dept: LAB | Age: 65
End: 2020-11-03

## 2020-11-03 DIAGNOSIS — R93.1 ELEVATED CORONARY ARTERY CALCIUM SCORE: ICD-10-CM

## 2020-11-03 DIAGNOSIS — I10 ESSENTIAL HYPERTENSION: ICD-10-CM

## 2020-11-03 DIAGNOSIS — E78.00 PURE HYPERCHOLESTEROLEMIA: ICD-10-CM

## 2020-11-03 DIAGNOSIS — R06.00 DYSPNEA, UNSPECIFIED TYPE: ICD-10-CM

## 2020-11-03 LAB
ALBUMIN SERPL-MCNC: 4.1 G/DL (ref 3.6–5.1)
ALBUMIN/GLOB SERPL: 1.5 {RATIO} (ref 1–2.4)
ALP SERPL-CCNC: 77 UNITS/L (ref 45–117)
ALT SERPL-CCNC: 35 UNITS/L
ANION GAP SERPL CALC-SCNC: 8 MMOL/L (ref 10–20)
AST SERPL-CCNC: 21 UNITS/L
BILIRUB SERPL-MCNC: 1.4 MG/DL (ref 0.2–1)
BUN SERPL-MCNC: 16 MG/DL (ref 6–20)
BUN/CREAT SERPL: 15 (ref 7–25)
CALCIUM SERPL-MCNC: 9 MG/DL (ref 8.4–10.2)
CHLORIDE SERPL-SCNC: 106 MMOL/L (ref 98–107)
CHOLEST SERPL-MCNC: 144 MG/DL
CHOLEST/HDLC SERPL: 2.5 {RATIO}
CO2 SERPL-SCNC: 32 MMOL/L (ref 21–32)
CREAT SERPL-MCNC: 1.05 MG/DL (ref 0.67–1.17)
GLOBULIN SER-MCNC: 2.7 G/DL (ref 2–4)
GLUCOSE SERPL-MCNC: 102 MG/DL (ref 65–99)
HDLC SERPL-MCNC: 58 MG/DL
LDLC SERPL CALC-MCNC: 57 MG/DL
LENGTH OF FAST TIME PATIENT: 12 HRS
LENGTH OF FAST TIME PATIENT: 12 HRS
NONHDLC SERPL-MCNC: 86 MG/DL
POTASSIUM SERPL-SCNC: 4.9 MMOL/L (ref 3.4–5.1)
PROT SERPL-MCNC: 6.8 G/DL (ref 6.4–8.2)
SODIUM SERPL-SCNC: 141 MMOL/L (ref 135–145)
TRIGL SERPL-MCNC: 147 MG/DL

## 2020-11-03 PROCEDURE — 36415 COLL VENOUS BLD VENIPUNCTURE: CPT | Performed by: INTERNAL MEDICINE

## 2020-11-03 PROCEDURE — 80061 LIPID PANEL: CPT | Performed by: INTERNAL MEDICINE

## 2020-11-03 PROCEDURE — 80053 COMPREHEN METABOLIC PANEL: CPT | Performed by: INTERNAL MEDICINE

## 2020-11-12 ENCOUNTER — LAB ENCOUNTER (OUTPATIENT)
Dept: LAB | Facility: HOSPITAL | Age: 65
End: 2020-11-12
Attending: UROLOGY
Payer: COMMERCIAL

## 2020-11-12 DIAGNOSIS — Z12.5 SCREENING PSA (PROSTATE SPECIFIC ANTIGEN): ICD-10-CM

## 2020-11-12 PROCEDURE — 36415 COLL VENOUS BLD VENIPUNCTURE: CPT

## 2020-12-09 ENCOUNTER — OFFICE VISIT (OUTPATIENT)
Dept: CARDIOLOGY | Age: 65
End: 2020-12-09

## 2020-12-09 VITALS
HEART RATE: 80 BPM | DIASTOLIC BLOOD PRESSURE: 85 MMHG | SYSTOLIC BLOOD PRESSURE: 135 MMHG | HEIGHT: 72 IN | RESPIRATION RATE: 16 BRPM | WEIGHT: 197 LBS | BODY MASS INDEX: 26.68 KG/M2

## 2020-12-09 DIAGNOSIS — E78.00 PURE HYPERCHOLESTEROLEMIA: ICD-10-CM

## 2020-12-09 DIAGNOSIS — I10 ESSENTIAL HYPERTENSION: ICD-10-CM

## 2020-12-09 DIAGNOSIS — R06.09 OTHER FORM OF DYSPNEA: ICD-10-CM

## 2020-12-09 DIAGNOSIS — R93.1 ELEVATED CORONARY ARTERY CALCIUM SCORE: Primary | ICD-10-CM

## 2020-12-09 PROCEDURE — 3075F SYST BP GE 130 - 139MM HG: CPT | Performed by: INTERNAL MEDICINE

## 2020-12-09 PROCEDURE — 99214 OFFICE O/P EST MOD 30 MIN: CPT | Performed by: INTERNAL MEDICINE

## 2020-12-09 PROCEDURE — 3079F DIAST BP 80-89 MM HG: CPT | Performed by: INTERNAL MEDICINE

## 2020-12-09 RX ORDER — HYDROCHLOROTHIAZIDE 12.5 MG/1
12.5 TABLET ORAL DAILY
Qty: 90 TABLET | Refills: 1 | Status: SHIPPED | OUTPATIENT
Start: 2020-12-09 | End: 2021-07-20 | Stop reason: SDUPTHER

## 2020-12-09 RX ORDER — HYDROCHLOROTHIAZIDE 12.5 MG/1
12.5 TABLET ORAL DAILY
COMMUNITY
End: 2020-12-09 | Stop reason: SDUPTHER

## 2020-12-09 RX ORDER — VALSARTAN 160 MG/1
160 TABLET ORAL DAILY
Qty: 90 TABLET | Refills: 3 | Status: SHIPPED | OUTPATIENT
Start: 2020-12-09 | End: 2022-01-14 | Stop reason: SDUPTHER

## 2020-12-09 SDOH — HEALTH STABILITY: PHYSICAL HEALTH: ON AVERAGE, HOW MANY DAYS PER WEEK DO YOU ENGAGE IN MODERATE TO STRENUOUS EXERCISE (LIKE A BRISK WALK)?: 6 DAYS

## 2020-12-09 SDOH — HEALTH STABILITY: MENTAL HEALTH: HOW OFTEN DO YOU HAVE A DRINK CONTAINING ALCOHOL?: 2-3 TIMES A WEEK

## 2020-12-09 SDOH — HEALTH STABILITY: PHYSICAL HEALTH: ON AVERAGE, HOW MANY MINUTES DO YOU ENGAGE IN EXERCISE AT THIS LEVEL?: 30 MIN

## 2020-12-09 ASSESSMENT — PATIENT HEALTH QUESTIONNAIRE - PHQ9
CLINICAL INTERPRETATION OF PHQ9 SCORE: NO FURTHER SCREENING NEEDED
SUM OF ALL RESPONSES TO PHQ9 QUESTIONS 1 AND 2: 0
SUM OF ALL RESPONSES TO PHQ9 QUESTIONS 1 AND 2: 0
CLINICAL INTERPRETATION OF PHQ2 SCORE: NO FURTHER SCREENING NEEDED
CLINICAL INTERPRETATION OF PHQ2 SCORE: NO FURTHER SCREENING NEEDED
SUM OF ALL RESPONSES TO PHQ9 QUESTIONS 1 AND 2: 0
2. FEELING DOWN, DEPRESSED OR HOPELESS: NOT AT ALL
1. LITTLE INTEREST OR PLEASURE IN DOING THINGS: NOT AT ALL
2. FEELING DOWN, DEPRESSED OR HOPELESS: NOT AT ALL
1. LITTLE INTEREST OR PLEASURE IN DOING THINGS: NOT AT ALL

## 2020-12-14 RX ORDER — PANTOPRAZOLE SODIUM 40 MG/1
40 TABLET, DELAYED RELEASE ORAL
Qty: 180 TABLET | Refills: 0 | Status: SHIPPED | OUTPATIENT
Start: 2020-12-14 | End: 2021-03-02

## 2020-12-14 NOTE — TELEPHONE ENCOUNTER
Nursing: I have refilled Rx x90 days however the patient is to for follow-up as we previously discussed during our May 2020 virtual visit. This may be scheduled nonurgently in the next couple of months.

## 2021-01-15 RX ORDER — ATORVASTATIN CALCIUM 10 MG/1
10 TABLET, FILM COATED ORAL DAILY
Qty: 90 TABLET | Refills: 3 | Status: SHIPPED | OUTPATIENT
Start: 2021-01-15 | End: 2021-03-02

## 2021-01-19 ENCOUNTER — MED REC SCAN ONLY (OUTPATIENT)
Dept: FAMILY MEDICINE CLINIC | Facility: CLINIC | Age: 66
End: 2021-01-19

## 2021-03-02 RX ORDER — ATORVASTATIN CALCIUM 10 MG/1
TABLET, FILM COATED ORAL
Qty: 90 TABLET | Refills: 3 | Status: SHIPPED | OUTPATIENT
Start: 2021-03-02 | End: 2022-08-10

## 2021-03-02 NOTE — TELEPHONE ENCOUNTER
Nursing: I have pended a prescription for a 30-day supply. See prior telephone encounter from December 2020. A nonurgent follow-up has been recommended as the patient is overdue. The pharmacy listed is in [place]. Is this accurate?

## 2021-03-02 NOTE — TELEPHONE ENCOUNTER
Requested Prescriptions     Pending Prescriptions Disp Refills   • PANTOPRAZOLE SODIUM 40 MG Oral Tab EC [Pharmacy Med Name: Pantoprazole Sodium Ec 40 Mg Tab Krem] 180 tablet 0     Sig: TAKE 1 TABLET (40 MG TOTAL) BY MOUTH 2 (TWO) TIMES DAILY BEFORE MEALS

## 2021-03-03 RX ORDER — PANTOPRAZOLE SODIUM 40 MG/1
40 TABLET, DELAYED RELEASE ORAL
Qty: 60 TABLET | Refills: 0 | Status: SHIPPED | OUTPATIENT
Start: 2021-03-03 | End: 2021-04-02

## 2021-03-03 NOTE — TELEPHONE ENCOUNTER
Etienne Wen I called and spoke with the patient. He states that he is temporarily in [address] [facility] and the listed pharmacy is correct.

## 2021-03-04 DIAGNOSIS — Z23 NEED FOR VACCINATION: ICD-10-CM

## 2021-04-13 RX ORDER — PANTOPRAZOLE SODIUM 40 MG/1
TABLET, DELAYED RELEASE ORAL
Qty: 90 TABLET | Refills: 0 | Status: SHIPPED | OUTPATIENT
Start: 2021-04-13 | End: 2021-05-26

## 2021-04-13 NOTE — TELEPHONE ENCOUNTER
Nursing: Rx has been refilled for 90 days. Patient will be due for routine office follow-up next month.   This may be scheduled nonurgently in the next few months

## 2021-04-13 NOTE — TELEPHONE ENCOUNTER
Requested Prescriptions     Pending Prescriptions Disp Refills   • PANTOPRAZOLE SODIUM 40 MG Oral Tab EC [Pharmacy Med Name: Pantoprazole Sodium Ec 40 Mg Tab Krem] 90 tablet 0     Sig: TAKE ONE TABLET BY MOUTH TWICE DAILY BEFORE MEALS     LOV: 5/7/2020 vir

## 2021-04-14 NOTE — TELEPHONE ENCOUNTER
Left message to call back and schedule. Call Center: If patient calls back please schedule office non urgent office visit with Yesenia.

## 2021-04-16 NOTE — TELEPHONE ENCOUNTER
Left message to call back and schedule. Call Center: Please schedule patient for a non urgent OV if patient calls back.

## 2021-05-26 RX ORDER — PANTOPRAZOLE SODIUM 40 MG/1
TABLET, DELAYED RELEASE ORAL
Qty: 30 TABLET | Refills: 0 | Status: SHIPPED | OUTPATIENT
Start: 2021-05-26 | End: 2021-08-13

## 2021-05-26 NOTE — TELEPHONE ENCOUNTER
Requested Prescriptions     Pending Prescriptions Disp Refills   • PANTOPRAZOLE SODIUM 40 MG Oral Tab EC [Pharmacy Med Name: Pantoprazole Sodium Ec 40 Mg Tab Kre] 90 tablet 0     Sig: TAKE ONE TABLET BY MOUTH TWICE DAILY BEFORE MEALS     LOV:  05/7/2021 V

## 2021-06-01 NOTE — TELEPHONE ENCOUNTER
Left message to call back and schedule. Please schedule office visit with Selam Peters if patient returns call.

## 2021-06-03 NOTE — TELEPHONE ENCOUNTER
Left message to call back and schedule appointment. Call Center if patient returns call, please schedule Office visit with Yesenia. Thank you ! Letter sent out to patient.

## 2021-06-09 RX ORDER — CLOPIDOGREL BISULFATE 75 MG/1
75 TABLET ORAL DAILY
Qty: 90 TABLET | Refills: 1 | Status: SHIPPED | OUTPATIENT
Start: 2021-06-09 | End: 2022-01-10

## 2021-07-15 NOTE — PROGRESS NOTES
[facility] Follow-up Visit    Taya History    Tobacco Use      Smoking status: Never Smoker      Smokeless tobacco: Never Used    Vaping Use      Vaping Use: Never used    Alcohol use:  Yes      Alcohol/week: 3.0 standard drinks      Types: 3 Standard drinks or equivalent per week    Drug us breath sounds normal, no respiratory distress, wheezes or rales  GI: soft, non-tender exam in all quadrants without rigidity or guarding, non-distended, no abnormal bowel sounds noted, no masses are palpated  : no CVA tenderness  Skin: dry, warm, no asael to 40 mg daily  -Follow-up with the office by phone as to whether continuing twice daily versus daily dosing  -Follow-up in office in 1 year or sooner if new issues arise      Orders This Visit:  No orders of the defined types were placed in this encounter

## 2021-07-20 RX ORDER — HYDROCHLOROTHIAZIDE 12.5 MG/1
12.5 TABLET ORAL DAILY
Qty: 90 TABLET | Refills: 3 | Status: SHIPPED | OUTPATIENT
Start: 2021-07-20 | End: 2022-07-15

## 2021-07-28 ENCOUNTER — OFFICE VISIT (OUTPATIENT)
Dept: GASTROENTEROLOGY | Facility: CLINIC | Age: 66
End: 2021-07-28
Payer: COMMERCIAL

## 2021-07-28 ENCOUNTER — TELEPHONE (OUTPATIENT)
Dept: GASTROENTEROLOGY | Facility: CLINIC | Age: 66
End: 2021-07-28

## 2021-07-28 VITALS
WEIGHT: 190 LBS | HEART RATE: 76 BPM | BODY MASS INDEX: 25.73 KG/M2 | SYSTOLIC BLOOD PRESSURE: 126 MMHG | DIASTOLIC BLOOD PRESSURE: 79 MMHG | HEIGHT: 72 IN

## 2021-07-28 DIAGNOSIS — K21.9 GASTROESOPHAGEAL REFLUX DISEASE, UNSPECIFIED WHETHER ESOPHAGITIS PRESENT: Primary | ICD-10-CM

## 2021-07-28 PROCEDURE — 3074F SYST BP LT 130 MM HG: CPT | Performed by: NURSE PRACTITIONER

## 2021-07-28 PROCEDURE — 3008F BODY MASS INDEX DOCD: CPT | Performed by: NURSE PRACTITIONER

## 2021-07-28 PROCEDURE — 3078F DIAST BP <80 MM HG: CPT | Performed by: NURSE PRACTITIONER

## 2021-07-28 PROCEDURE — 99214 OFFICE O/P EST MOD 30 MIN: CPT | Performed by: NURSE PRACTITIONER

## 2021-07-28 NOTE — PATIENT INSTRUCTIONS
-Decrease pantoprazole to 40 mg daily  -Follow-up with the office by phone as to whether continuing twice daily versus daily dosing  -Follow-up in office in 1 year or sooner if new issues arise

## 2021-08-13 ENCOUNTER — TELEPHONE (OUTPATIENT)
Dept: GASTROENTEROLOGY | Facility: CLINIC | Age: 66
End: 2021-08-13

## 2021-08-13 ENCOUNTER — TELEPHONE (OUTPATIENT)
Dept: FAMILY MEDICINE CLINIC | Facility: CLINIC | Age: 66
End: 2021-08-13

## 2021-08-13 RX ORDER — PANTOPRAZOLE SODIUM 40 MG/1
40 TABLET, DELAYED RELEASE ORAL
Qty: 180 TABLET | Refills: 0 | Status: SHIPPED | OUTPATIENT
Start: 2021-08-13 | End: 2021-11-23

## 2021-08-13 NOTE — TELEPHONE ENCOUNTER
Dr. Iona Pearson-    If able to do so, please refill Pantoprazole 40 mg rx if appropriate on behalf of Brandon Simpson. Pended order below. Thank you!     LR:5/26/21  LOV:7/28/21      Per LOV note, patient was to call in and inform on symptom update if decreasing Pan

## 2021-08-13 NOTE — TELEPHONE ENCOUNTER
Patient requesting refill of Pantoprazole 40 mg  Advised Meghan Martin for GI was last to fill this medication for the last 5 times  Pharmacy updated and phone call transferred

## 2021-08-13 NOTE — TELEPHONE ENCOUNTER
Pt called to request refill:       Current Outpatient Medications:   •  PANTOPRAZOLE SODIUM 40 MG Oral Tab EC, TAKE ONE TABLET BY MOUTH TWICE DAILY BEFORE MEALS , Disp: 30 tablet, Rfl: 0      Pt is almost out of medication. Please call.

## 2021-10-15 ENCOUNTER — TELEPHONE (OUTPATIENT)
Dept: OPHTHALMOLOGY | Facility: CLINIC | Age: 66
End: 2021-10-15

## 2021-10-25 ENCOUNTER — TELEPHONE (OUTPATIENT)
Dept: CARDIOLOGY | Age: 66
End: 2021-10-25

## 2021-10-27 ENCOUNTER — PATIENT MESSAGE (OUTPATIENT)
Dept: FAMILY MEDICINE CLINIC | Facility: CLINIC | Age: 66
End: 2021-10-27

## 2021-10-27 NOTE — TELEPHONE ENCOUNTER
From: Ania Montgomery  To:  Aguila Sim DO  Sent: 10/27/2021 1:58 PM CDT  Subject: test results    please look at these before our meeting tomorrow

## 2021-10-29 ENCOUNTER — LAB SERVICES (OUTPATIENT)
Dept: LAB | Age: 66
End: 2021-10-29

## 2021-10-29 ENCOUNTER — TELEPHONE (OUTPATIENT)
Dept: CARDIOLOGY | Age: 66
End: 2021-10-29

## 2021-10-29 ENCOUNTER — TELEMEDICINE (OUTPATIENT)
Dept: FAMILY MEDICINE CLINIC | Facility: CLINIC | Age: 66
End: 2021-10-29

## 2021-10-29 DIAGNOSIS — I10 ESSENTIAL HYPERTENSION: ICD-10-CM

## 2021-10-29 DIAGNOSIS — K56.1 SMALL BOWEL INTUSSUSCEPTION (HCC): Primary | ICD-10-CM

## 2021-10-29 DIAGNOSIS — R06.09 OTHER FORM OF DYSPNEA: ICD-10-CM

## 2021-10-29 DIAGNOSIS — E78.00 PURE HYPERCHOLESTEROLEMIA: ICD-10-CM

## 2021-10-29 DIAGNOSIS — R93.1 ELEVATED CORONARY ARTERY CALCIUM SCORE: ICD-10-CM

## 2021-10-29 LAB
ALBUMIN SERPL-MCNC: 3.7 G/DL (ref 3.6–5.1)
ALBUMIN/GLOB SERPL: 1.5 {RATIO} (ref 1–2.4)
ALP SERPL-CCNC: 71 UNITS/L (ref 45–117)
ALT SERPL-CCNC: 28 UNITS/L
ANION GAP SERPL CALC-SCNC: 5 MMOL/L (ref 10–20)
AST SERPL-CCNC: 16 UNITS/L
BILIRUB SERPL-MCNC: 1 MG/DL (ref 0.2–1)
BUN SERPL-MCNC: 16 MG/DL (ref 6–20)
BUN/CREAT SERPL: 17 (ref 7–25)
CALCIUM SERPL-MCNC: 8.9 MG/DL (ref 8.4–10.2)
CHLORIDE SERPL-SCNC: 105 MMOL/L (ref 98–107)
CHOLEST SERPL-MCNC: 125 MG/DL
CHOLEST/HDLC SERPL: 2.8 {RATIO}
CO2 SERPL-SCNC: 33 MMOL/L (ref 21–32)
CREAT SERPL-MCNC: 0.96 MG/DL (ref 0.67–1.17)
DEPRECATED RDW RBC: 40.6 FL (ref 39–50)
ERYTHROCYTE [DISTWIDTH] IN BLOOD: 12.2 % (ref 11–15)
FASTING DURATION TIME PATIENT: ABNORMAL H
FASTING DURATION TIME PATIENT: NORMAL H
GFR SERPLBLD BASED ON 1.73 SQ M-ARVRAT: 81 ML/MIN
GLOBULIN SER-MCNC: 2.5 G/DL (ref 2–4)
GLUCOSE SERPL-MCNC: 108 MG/DL (ref 70–99)
HCT VFR BLD CALC: 42.6 % (ref 39–51)
HDLC SERPL-MCNC: 45 MG/DL
HGB BLD-MCNC: 14.7 G/DL (ref 13–17)
LDLC SERPL CALC-MCNC: 62 MG/DL
MAGNESIUM SERPL-MCNC: 2.3 MG/DL (ref 1.7–2.4)
MCH RBC QN AUTO: 31.3 PG (ref 26–34)
MCHC RBC AUTO-ENTMCNC: 34.5 G/DL (ref 32–36.5)
MCV RBC AUTO: 90.6 FL (ref 78–100)
NONHDLC SERPL-MCNC: 80 MG/DL
NRBC BLD MANUAL-RTO: 0 /100 WBC
PLATELET # BLD AUTO: 216 K/MCL (ref 140–450)
POTASSIUM SERPL-SCNC: 4.1 MMOL/L (ref 3.4–5.1)
PROT SERPL-MCNC: 6.2 G/DL (ref 6.4–8.2)
RBC # BLD: 4.7 MIL/MCL (ref 4.5–5.9)
SODIUM SERPL-SCNC: 139 MMOL/L (ref 135–145)
TRIGL SERPL-MCNC: 88 MG/DL
WBC # BLD: 5.5 K/MCL (ref 4.2–11)

## 2021-10-29 PROCEDURE — 99214 OFFICE O/P EST MOD 30 MIN: CPT | Performed by: FAMILY MEDICINE

## 2021-10-29 PROCEDURE — 80061 LIPID PANEL: CPT | Performed by: PSYCHIATRY & NEUROLOGY

## 2021-10-29 PROCEDURE — 83735 ASSAY OF MAGNESIUM: CPT | Performed by: PSYCHIATRY & NEUROLOGY

## 2021-10-29 PROCEDURE — 85027 COMPLETE CBC AUTOMATED: CPT | Performed by: PSYCHIATRY & NEUROLOGY

## 2021-10-29 PROCEDURE — 36415 COLL VENOUS BLD VENIPUNCTURE: CPT | Performed by: PSYCHIATRY & NEUROLOGY

## 2021-10-29 PROCEDURE — 80053 COMPREHEN METABOLIC PANEL: CPT | Performed by: PSYCHIATRY & NEUROLOGY

## 2021-10-29 NOTE — PROGRESS NOTES
This is a telemedicine visit with live, interactive video and audio. Patient understands and accepts financial responsibility for any deductible, co-insurance and/or co-pays associated with this service.     SUBJECTIVE  Patient seen two weeks ago in [place] stated age and cooperative    I spent a total of 12 minutes on the video visit. ASSESSMENT & PLAN  Diagnoses and all orders for this visit:    Small bowel intussusception ([facility] [address]  -     CT ABDOMEN+PELVIS(CONTRAST ONLY)(CPT=74177);  Future       I was able

## 2021-11-11 ENCOUNTER — TELEPHONE (OUTPATIENT)
Dept: CASE MANAGEMENT | Age: 66
End: 2021-11-11

## 2021-11-11 NOTE — TELEPHONE ENCOUNTER
Anum Perez,    Pt CT Abdomen and Pelvis with contrast has been denied. You can do a peer to peer by calling [phone]  The tracking # [id number]      Please advise plan of care.     Thank you,  [facility]   Referral specialist

## 2021-11-16 ENCOUNTER — APPOINTMENT (OUTPATIENT)
Dept: CARDIOLOGY | Age: 66
End: 2021-11-16

## 2021-11-23 RX ORDER — PANTOPRAZOLE SODIUM 40 MG/1
40 TABLET, DELAYED RELEASE ORAL
Qty: 180 TABLET | Refills: 0 | Status: SHIPPED | OUTPATIENT
Start: 2021-11-23

## 2021-11-23 NOTE — TELEPHONE ENCOUNTER
Requested Prescriptions     Pending Prescriptions Disp Refills   • PANTOPRAZOLE 40 MG Oral Tab EC [Pharmacy Med Name: Pantoprazole Sodium Ec 40 Mg Tab Auro] 180 tablet 0     Sig: Take 1 tablet (40 mg total) by mouth 2 (two) times daily before meals     LOV

## 2021-11-30 NOTE — TELEPHONE ENCOUNTER
Patient is calling to follow up and states insurance denied CT Scan and patient wants to know next plan of action.

## 2021-12-06 NOTE — TELEPHONE ENCOUNTER
Patient requesting return call on prior approval request.Patient would like to know when he will be able to schedule the test.

## 2021-12-06 NOTE — TELEPHONE ENCOUNTER
Yes - let him know long wait time on phone on hold to try pre auth. Unable to completed due to the time needed to wait and complete. Will try again this week.

## 2021-12-08 NOTE — TELEPHONE ENCOUNTER
Called [phone];  I was told that peer to peer does not need to be scheduled . We can return the call whenever dr mEilia Costello is in the office to discuss the denial of CT. Please call # above to resolve the matter. Called pt to inform of the process.  Pt ve

## 2021-12-09 NOTE — TELEPHONE ENCOUNTER
That information is not correct. I connected today and needed to have a peer to peer medical determination for necessity. After 17 mins on the phone it was cleared.   His approval # is [id number]  I assume he gives this # to [address] [place] [address] when he schedules and this i

## 2021-12-15 ENCOUNTER — APPOINTMENT (OUTPATIENT)
Dept: CARDIOLOGY | Age: 66
End: 2021-12-15

## 2021-12-28 ENCOUNTER — HOSPITAL ENCOUNTER (OUTPATIENT)
Dept: CT IMAGING | Facility: HOSPITAL | Age: 66
Discharge: HOME | End: 2021-12-28
Attending: FAMILY MEDICINE
Payer: COMMERCIAL

## 2021-12-28 DIAGNOSIS — K56.1 SMALL BOWEL INTUSSUSCEPTION (HCC): ICD-10-CM

## 2021-12-28 PROCEDURE — 74177 CT ABD & PELVIS W/CONTRAST: CPT | Performed by: FAMILY MEDICINE

## 2021-12-28 PROCEDURE — 82565 ASSAY OF CREATININE: CPT

## 2022-01-10 RX ORDER — CLOPIDOGREL BISULFATE 75 MG/1
TABLET ORAL
Qty: 90 TABLET | Refills: 0 | Status: SHIPPED | OUTPATIENT
Start: 2022-01-10 | End: 2022-04-29

## 2022-01-14 RX ORDER — VALSARTAN 160 MG/1
160 TABLET ORAL DAILY
Qty: 30 TABLET | Refills: 0 | Status: SHIPPED | OUTPATIENT
Start: 2022-01-14 | End: 2022-06-13

## 2022-02-01 ENCOUNTER — APPOINTMENT (OUTPATIENT)
Dept: CARDIOLOGY | Age: 67
End: 2022-02-01

## 2022-02-23 RX ORDER — PANTOPRAZOLE SODIUM 40 MG/1
40 TABLET, DELAYED RELEASE ORAL
Qty: 90 TABLET | Refills: 1 | Status: SHIPPED | OUTPATIENT
Start: 2022-02-23

## 2022-04-29 RX ORDER — CLOPIDOGREL BISULFATE 75 MG/1
TABLET ORAL
Qty: 90 TABLET | Refills: 0 | Status: SHIPPED | OUTPATIENT
Start: 2022-04-29 | End: 2022-08-01

## 2022-05-05 ENCOUNTER — TELEPHONE (OUTPATIENT)
Dept: GASTROENTEROLOGY | Facility: CLINIC | Age: 67
End: 2022-05-05

## 2022-05-05 RX ORDER — PANTOPRAZOLE SODIUM 40 MG/1
40 TABLET, DELAYED RELEASE ORAL 2 TIMES DAILY
Qty: 180 TABLET | Refills: 1 | Status: SHIPPED | OUTPATIENT
Start: 2022-05-05 | End: 2022-08-03

## 2022-05-05 NOTE — TELEPHONE ENCOUNTER
Noted. That was my error.  I see from the prior TE that pt wished to remain on BID dosing vs every day which was d/w pt at last OV

## 2022-05-05 NOTE — TELEPHONE ENCOUNTER
Willi Rojas-    ALBERTINA called and spoke with Tana Leonardo. He inquired why his Pantoprazole 40 mg is only enough for him to take #1 tablet daily, when he was instructed to take it two times daily. LR: 2-23-22 #90 tablets with 1 refill    Please send alternate script to reflect the sig of taking Pantoprazole 40 mg twice daily per patient's request in order to prevent breakthrough symptoms.     Thank you

## 2022-05-10 ENCOUNTER — APPOINTMENT (OUTPATIENT)
Dept: CARDIOLOGY | Age: 67
End: 2022-05-10

## 2022-05-31 ENCOUNTER — OFFICE VISIT (OUTPATIENT)
Dept: CARDIOLOGY | Age: 67
End: 2022-05-31

## 2022-05-31 VITALS
HEART RATE: 75 BPM | DIASTOLIC BLOOD PRESSURE: 83 MMHG | WEIGHT: 194 LBS | BODY MASS INDEX: 26.28 KG/M2 | SYSTOLIC BLOOD PRESSURE: 135 MMHG | HEIGHT: 72 IN

## 2022-05-31 DIAGNOSIS — R06.09 OTHER FORM OF DYSPNEA: ICD-10-CM

## 2022-05-31 DIAGNOSIS — R93.1 ELEVATED CORONARY ARTERY CALCIUM SCORE: Primary | ICD-10-CM

## 2022-05-31 DIAGNOSIS — I10 ESSENTIAL HYPERTENSION: ICD-10-CM

## 2022-05-31 DIAGNOSIS — E78.00 PURE HYPERCHOLESTEROLEMIA: ICD-10-CM

## 2022-05-31 DIAGNOSIS — Z82.49 FAMILY HISTORY OF ABDOMINAL AORTIC ANEURYSM (AAA): ICD-10-CM

## 2022-05-31 PROCEDURE — 99214 OFFICE O/P EST MOD 30 MIN: CPT | Performed by: INTERNAL MEDICINE

## 2022-05-31 PROCEDURE — 3075F SYST BP GE 130 - 139MM HG: CPT | Performed by: INTERNAL MEDICINE

## 2022-05-31 PROCEDURE — 3079F DIAST BP 80-89 MM HG: CPT | Performed by: INTERNAL MEDICINE

## 2022-05-31 SDOH — HEALTH STABILITY: PHYSICAL HEALTH: ON AVERAGE, HOW MANY DAYS PER WEEK DO YOU ENGAGE IN MODERATE TO STRENUOUS EXERCISE (LIKE A BRISK WALK)?: 3 DAYS

## 2022-05-31 ASSESSMENT — PATIENT HEALTH QUESTIONNAIRE - PHQ9
SUM OF ALL RESPONSES TO PHQ9 QUESTIONS 1 AND 2: 0
SUM OF ALL RESPONSES TO PHQ9 QUESTIONS 1 AND 2: 0
CLINICAL INTERPRETATION OF PHQ2 SCORE: NO FURTHER SCREENING NEEDED
1. LITTLE INTEREST OR PLEASURE IN DOING THINGS: NOT AT ALL
2. FEELING DOWN, DEPRESSED OR HOPELESS: NOT AT ALL

## 2022-06-04 ENCOUNTER — MED REC SCAN ONLY (OUTPATIENT)
Dept: FAMILY MEDICINE CLINIC | Facility: CLINIC | Age: 67
End: 2022-06-04

## 2022-06-13 RX ORDER — VALSARTAN 160 MG/1
TABLET ORAL
Qty: 90 TABLET | Refills: 3 | Status: SHIPPED | OUTPATIENT
Start: 2022-06-13 | End: 2023-11-06

## 2022-06-14 ENCOUNTER — TELEPHONE (OUTPATIENT)
Dept: CARDIOLOGY | Age: 67
End: 2022-06-14

## 2022-07-15 RX ORDER — HYDROCHLOROTHIAZIDE 12.5 MG/1
TABLET ORAL
Qty: 90 TABLET | Refills: 2 | Status: SHIPPED | OUTPATIENT
Start: 2022-07-15 | End: 2023-03-20

## 2022-08-01 RX ORDER — PANTOPRAZOLE SODIUM 40 MG/1
TABLET, DELAYED RELEASE ORAL
Qty: 90 TABLET | Refills: 0 | Status: SHIPPED | OUTPATIENT
Start: 2022-08-01

## 2022-08-01 RX ORDER — CLOPIDOGREL BISULFATE 75 MG/1
TABLET ORAL
Qty: 90 TABLET | Refills: 3 | Status: SHIPPED | OUTPATIENT
Start: 2022-08-01 | End: 2023-09-22

## 2022-08-10 RX ORDER — ATORVASTATIN CALCIUM 10 MG/1
TABLET, FILM COATED ORAL
Qty: 90 TABLET | Refills: 2 | Status: SHIPPED | OUTPATIENT
Start: 2022-08-10 | End: 2023-05-24

## 2022-10-18 ENCOUNTER — OFFICE VISIT (OUTPATIENT)
Dept: FAMILY MEDICINE CLINIC | Facility: CLINIC | Age: 67
End: 2022-10-18
Payer: COMMERCIAL

## 2022-10-18 ENCOUNTER — LAB ENCOUNTER (OUTPATIENT)
Dept: LAB | Age: 67
End: 2022-10-18
Attending: FAMILY MEDICINE
Payer: COMMERCIAL

## 2022-10-18 ENCOUNTER — EXTERNAL RECORD (OUTPATIENT)
Dept: HEALTH INFORMATION MANAGEMENT | Facility: OTHER | Age: 67
End: 2022-10-18

## 2022-10-18 VITALS
HEIGHT: 72 IN | DIASTOLIC BLOOD PRESSURE: 87 MMHG | SYSTOLIC BLOOD PRESSURE: 143 MMHG | HEART RATE: 65 BPM | WEIGHT: 183 LBS | BODY MASS INDEX: 24.79 KG/M2

## 2022-10-18 DIAGNOSIS — I10 ESSENTIAL HYPERTENSION: ICD-10-CM

## 2022-10-18 DIAGNOSIS — E78.00 HYPERCHOLESTEROLEMIA: ICD-10-CM

## 2022-10-18 DIAGNOSIS — Z00.00 ROUTINE GENERAL MEDICAL EXAMINATION AT A HEALTH CARE FACILITY: Primary | ICD-10-CM

## 2022-10-18 DIAGNOSIS — Z00.00 ROUTINE GENERAL MEDICAL EXAMINATION AT A HEALTH CARE FACILITY: ICD-10-CM

## 2022-10-18 DIAGNOSIS — H26.9 CATARACT OF BOTH EYES, UNSPECIFIED CATARACT TYPE: ICD-10-CM

## 2022-10-18 PROBLEM — J01.90 ACUTE SINUSITIS: Status: RESOLVED | Noted: 2018-06-02 | Resolved: 2022-10-18

## 2022-10-18 PROBLEM — G45.4 TGA (TRANSIENT GLOBAL AMNESIA): Status: RESOLVED | Noted: 2018-07-25 | Resolved: 2022-10-18

## 2022-10-18 LAB
ALBUMIN SERPL-MCNC: 3.9 G/DL (ref 3.4–5)
ALBUMIN/GLOB SERPL: 1.3 {RATIO} (ref 1–2)
ALP LIVER SERPL-CCNC: 72 U/L
ALT SERPL-CCNC: 26 U/L
ANION GAP SERPL CALC-SCNC: 6 MMOL/L (ref 0–18)
AST SERPL-CCNC: 13 U/L (ref 15–37)
BASOPHILS # BLD AUTO: 0.03 X10(3) UL (ref 0–0.2)
BASOPHILS NFR BLD AUTO: 0.5 %
BILIRUB SERPL-MCNC: 1.8 MG/DL (ref 0.1–2)
BILIRUB UR QL: NEGATIVE
BUN BLD-MCNC: 14 MG/DL (ref 7–18)
BUN/CREAT SERPL: 15.1 (ref 10–20)
CALCIUM BLD-MCNC: 9.3 MG/DL (ref 8.5–10.1)
CHLORIDE SERPL-SCNC: 105 MMOL/L (ref 98–112)
CHOLEST SERPL-MCNC: 120 MG/DL (ref ?–200)
CLARITY UR: CLEAR
CO2 SERPL-SCNC: 30 MMOL/L (ref 21–32)
COLOR UR: YELLOW
COMPLEXED PSA SERPL-MCNC: 0.25 NG/ML (ref ?–4)
CREAT BLD-MCNC: 0.93 MG/DL
DEPRECATED RDW RBC AUTO: 41.3 FL (ref 35.1–46.3)
EOSINOPHIL # BLD AUTO: 0.08 X10(3) UL (ref 0–0.7)
EOSINOPHIL NFR BLD AUTO: 1.3 %
ERYTHROCYTE [DISTWIDTH] IN BLOOD BY AUTOMATED COUNT: 12.1 % (ref 11–15)
FASTING PATIENT LIPID ANSWER: YES
FASTING STATUS PATIENT QL REPORTED: YES
GFR SERPLBLD BASED ON 1.73 SQ M-ARVRAT: 89 ML/MIN/1.73M2 (ref 60–?)
GLOBULIN PLAS-MCNC: 3 G/DL (ref 2.8–4.4)
GLUCOSE BLD-MCNC: 93 MG/DL (ref 70–99)
GLUCOSE UR-MCNC: NEGATIVE MG/DL
HCT VFR BLD AUTO: 44.7 %
HDLC SERPL-MCNC: 49 MG/DL (ref 40–59)
HGB BLD-MCNC: 15.7 G/DL
HGB UR QL STRIP.AUTO: NEGATIVE
IMM GRANULOCYTES # BLD AUTO: 0.01 X10(3) UL (ref 0–1)
IMM GRANULOCYTES NFR BLD: 0.2 %
KETONES UR-MCNC: NEGATIVE MG/DL
LDLC SERPL CALC-MCNC: 49 MG/DL (ref ?–100)
LEUKOCYTE ESTERASE UR QL STRIP.AUTO: NEGATIVE
LYMPHOCYTES # BLD AUTO: 1.98 X10(3) UL (ref 1–4)
LYMPHOCYTES NFR BLD AUTO: 31.6 %
MCH RBC QN AUTO: 32.3 PG (ref 26–34)
MCHC RBC AUTO-ENTMCNC: 35.1 G/DL (ref 31–37)
MCV RBC AUTO: 92 FL
MONOCYTES # BLD AUTO: 0.54 X10(3) UL (ref 0.1–1)
MONOCYTES NFR BLD AUTO: 8.6 %
NEUTROPHILS # BLD AUTO: 3.63 X10 (3) UL (ref 1.5–7.7)
NEUTROPHILS # BLD AUTO: 3.63 X10(3) UL (ref 1.5–7.7)
NEUTROPHILS NFR BLD AUTO: 57.8 %
NITRITE UR QL STRIP.AUTO: NEGATIVE
NONHDLC SERPL-MCNC: 71 MG/DL (ref ?–130)
OSMOLALITY SERPL CALC.SUM OF ELEC: 292 MOSM/KG (ref 275–295)
PH UR: 6 [PH] (ref 5–8)
PLATELET # BLD AUTO: 201 10(3)UL (ref 150–450)
POTASSIUM SERPL-SCNC: 3.5 MMOL/L (ref 3.5–5.1)
PROT SERPL-MCNC: 6.9 G/DL (ref 6.4–8.2)
PROT UR-MCNC: 30 MG/DL
RBC # BLD AUTO: 4.86 X10(6)UL
SODIUM SERPL-SCNC: 141 MMOL/L (ref 136–145)
SP GR UR STRIP: 1.02 (ref 1–1.03)
TRIGL SERPL-MCNC: 122 MG/DL (ref 30–149)
UROBILINOGEN UR STRIP-ACNC: <2
VIT C UR-MCNC: NEGATIVE MG/DL
VLDLC SERPL CALC-MCNC: 17 MG/DL (ref 0–30)
WBC # BLD AUTO: 6.3 X10(3) UL (ref 4–11)

## 2022-10-18 PROCEDURE — 80061 LIPID PANEL: CPT

## 2022-10-18 PROCEDURE — 3077F SYST BP >= 140 MM HG: CPT | Performed by: FAMILY MEDICINE

## 2022-10-18 PROCEDURE — 3008F BODY MASS INDEX DOCD: CPT | Performed by: FAMILY MEDICINE

## 2022-10-18 PROCEDURE — 93005 ELECTROCARDIOGRAM TRACING: CPT

## 2022-10-18 PROCEDURE — 99397 PER PM REEVAL EST PAT 65+ YR: CPT | Performed by: FAMILY MEDICINE

## 2022-10-18 PROCEDURE — 93010 ELECTROCARDIOGRAM REPORT: CPT | Performed by: FAMILY MEDICINE

## 2022-10-18 PROCEDURE — 36415 COLL VENOUS BLD VENIPUNCTURE: CPT

## 2022-10-18 PROCEDURE — 85025 COMPLETE CBC W/AUTO DIFF WBC: CPT

## 2022-10-18 PROCEDURE — 3079F DIAST BP 80-89 MM HG: CPT | Performed by: FAMILY MEDICINE

## 2022-10-18 PROCEDURE — 80053 COMPREHEN METABOLIC PANEL: CPT

## 2022-10-18 PROCEDURE — 81001 URINALYSIS AUTO W/SCOPE: CPT

## 2022-10-27 ENCOUNTER — TELEPHONE (OUTPATIENT)
Dept: FAMILY MEDICINE CLINIC | Facility: CLINIC | Age: 67
End: 2022-10-27

## 2022-10-27 RX ORDER — PANTOPRAZOLE SODIUM 40 MG/1
TABLET, DELAYED RELEASE ORAL
Qty: 180 TABLET | Refills: 0 | Status: SHIPPED | OUTPATIENT
Start: 2022-10-27

## 2022-10-27 NOTE — TELEPHONE ENCOUNTER
I have refilled the medication. I have not seen the patient previously. Please have the patient make a follow-up appointment with the next available provider for further refills.

## 2022-10-27 NOTE — TELEPHONE ENCOUNTER
Juana Cartwright office calling to request medical clearance be sent by fax, surgery is on 11/01/2022  Fax: [phone]  [facility]: [phone]

## 2022-10-31 NOTE — TELEPHONE ENCOUNTER
LATE  ENTRY. Pre op clearance - OV notes, labs, EKG report and tracing successfully faxed to [phone] on 10/28/22 at 13:15. Confirmation # placed in scanning. Surgery scheduled for 11/1/22. See Dominique's note is paperwork needed.

## 2022-12-17 ENCOUNTER — MED REC SCAN ONLY (OUTPATIENT)
Dept: FAMILY MEDICINE CLINIC | Facility: CLINIC | Age: 67
End: 2022-12-17

## 2023-02-02 ENCOUNTER — TELEPHONE (OUTPATIENT)
Dept: FAMILY MEDICINE CLINIC | Facility: CLINIC | Age: 68
End: 2023-02-02

## 2023-02-02 LAB — AMB EXT COVID-19 RESULT: DETECTED

## 2023-02-02 NOTE — TELEPHONE ENCOUNTER
Patient calling (identified name and ) tested Covid + with home test with symptoms. Chart marked for Covid. Requesting Paxlovid. Patient stated he is currently in [place]. Explained to patient medication cannot be prescribed without video visit or being seen. Advised for patient to go to an IC or walk-in clinic near him. Patient verbalized understanding and agrees with plan.

## 2023-03-20 RX ORDER — HYDROCHLOROTHIAZIDE 12.5 MG/1
TABLET ORAL
Qty: 90 TABLET | Refills: 0 | Status: SHIPPED | OUTPATIENT
Start: 2023-03-20 | End: 2023-10-09

## 2023-04-25 RX ORDER — PANTOPRAZOLE SODIUM 40 MG/1
TABLET, DELAYED RELEASE ORAL
Qty: 180 TABLET | Refills: 0 | Status: SHIPPED | OUTPATIENT
Start: 2023-04-25

## 2023-05-16 ENCOUNTER — APPOINTMENT (OUTPATIENT)
Dept: CARDIOLOGY | Age: 68
End: 2023-05-16

## 2023-05-24 RX ORDER — ATORVASTATIN CALCIUM 10 MG/1
TABLET, FILM COATED ORAL
Qty: 90 TABLET | Refills: 0 | Status: SHIPPED | OUTPATIENT
Start: 2023-05-24 | End: 2023-10-02

## 2023-07-12 ENCOUNTER — OFFICE VISIT (OUTPATIENT)
Dept: INTERNAL MEDICINE CLINIC | Facility: CLINIC | Age: 68
End: 2023-07-12

## 2023-07-12 VITALS
HEIGHT: 72 IN | WEIGHT: 194 LBS | HEART RATE: 68 BPM | DIASTOLIC BLOOD PRESSURE: 88 MMHG | SYSTOLIC BLOOD PRESSURE: 148 MMHG | BODY MASS INDEX: 26.28 KG/M2 | OXYGEN SATURATION: 97 %

## 2023-07-12 DIAGNOSIS — S46.811A TRAPEZIUS STRAIN, RIGHT, INITIAL ENCOUNTER: Primary | ICD-10-CM

## 2023-07-12 PROBLEM — I10 HYPERTENSION: Status: ACTIVE | Noted: 2023-07-12

## 2023-07-12 PROBLEM — K21.9 GASTROESOPHAGEAL REFLUX DISEASE: Status: ACTIVE | Noted: 2017-01-25

## 2023-07-12 PROCEDURE — 3077F SYST BP >= 140 MM HG: CPT

## 2023-07-12 PROCEDURE — 3079F DIAST BP 80-89 MM HG: CPT

## 2023-07-12 PROCEDURE — 3008F BODY MASS INDEX DOCD: CPT

## 2023-07-12 PROCEDURE — 99214 OFFICE O/P EST MOD 30 MIN: CPT

## 2023-07-12 RX ORDER — NEOMYCIN SULFATE, POLYMYXIN B SULFATE AND HYDROCORTISONE 10; 3.5; 1 MG/ML; MG/ML; [USP'U]/ML
4 SUSPENSION/ DROPS AURICULAR (OTIC) DAILY
COMMUNITY
Start: 2023-04-12

## 2023-07-12 RX ORDER — CYCLOBENZAPRINE HCL 10 MG
10 TABLET ORAL 3 TIMES DAILY
Qty: 20 TABLET | Refills: 1 | Status: SHIPPED | OUTPATIENT
Start: 2023-07-12 | End: 2023-08-01

## 2023-08-10 RX ORDER — PANTOPRAZOLE SODIUM 40 MG/1
40 TABLET, DELAYED RELEASE ORAL
Qty: 180 TABLET | Refills: 0 | Status: SHIPPED | OUTPATIENT
Start: 2023-08-10

## 2023-08-10 NOTE — TELEPHONE ENCOUNTER
Requested Prescriptions     Pending Prescriptions Disp Refills    PANTOPRAZOLE 40 MG Oral Tab EC [Pharmacy Med Name: Pantoprazole Sodium Ec 40 Mg Tab Auro] 180 tablet 0     Sig: TAKE 1 TABLET (40MG) BY MOUTH TWO TIMES DAILY.      LOV: 7/28/2021  Last Refill: 04/25/2023

## 2023-08-14 ENCOUNTER — APPOINTMENT (OUTPATIENT)
Dept: CARDIOLOGY | Age: 68
End: 2023-08-14

## 2023-08-16 ENCOUNTER — TELEPHONE (OUTPATIENT)
Facility: CLINIC | Age: 68
End: 2023-08-16

## 2023-08-16 ENCOUNTER — OFFICE VISIT (OUTPATIENT)
Facility: CLINIC | Age: 68
End: 2023-08-16

## 2023-08-16 VITALS
BODY MASS INDEX: 24.92 KG/M2 | HEART RATE: 82 BPM | SYSTOLIC BLOOD PRESSURE: 113 MMHG | WEIGHT: 184 LBS | HEIGHT: 72 IN | DIASTOLIC BLOOD PRESSURE: 72 MMHG

## 2023-08-16 DIAGNOSIS — Z12.12 SCREENING FOR COLORECTAL CANCER: ICD-10-CM

## 2023-08-16 DIAGNOSIS — Z12.11 SCREENING FOR COLON CANCER: ICD-10-CM

## 2023-08-16 DIAGNOSIS — Z12.11 SCREENING FOR COLORECTAL CANCER: ICD-10-CM

## 2023-08-16 DIAGNOSIS — K21.9 GASTROESOPHAGEAL REFLUX DISEASE, UNSPECIFIED WHETHER ESOPHAGITIS PRESENT: Primary | ICD-10-CM

## 2023-08-16 PROCEDURE — 3008F BODY MASS INDEX DOCD: CPT | Performed by: INTERNAL MEDICINE

## 2023-08-16 PROCEDURE — 3078F DIAST BP <80 MM HG: CPT | Performed by: INTERNAL MEDICINE

## 2023-08-16 PROCEDURE — 99213 OFFICE O/P EST LOW 20 MIN: CPT | Performed by: INTERNAL MEDICINE

## 2023-08-16 PROCEDURE — 3074F SYST BP LT 130 MM HG: CPT | Performed by: INTERNAL MEDICINE

## 2023-08-16 RX ORDER — SODIUM, POTASSIUM,MAG SULFATES 17.5-3.13G
SOLUTION, RECONSTITUTED, ORAL ORAL
Qty: 1 EACH | Refills: 0 | Status: SHIPPED | OUTPATIENT
Start: 2023-08-16

## 2023-08-16 NOTE — PATIENT INSTRUCTIONS
1.  Continue pantoprazole. 2.  Schedule upper endoscopy for gastroesophageal reflux. 3.  Schedule screening colonoscopy following a split dose Suprep (may substitute TriLyte if needed for insurance coverage) and monitored anesthesia care. 4.  Hold clopidogrel for 5 days preceding the procedure.

## 2023-08-16 NOTE — TELEPHONE ENCOUNTER
Scheduled for:  Colonoscopy 41669/40251 EGD [id number]  Provider Name:  Dr Yesenia Rapp  Date:  10/19/2023  Location:  [facility]  Sedation:  MAC  Time:  8:00 am. (pt is aware to arrive at 7:00 am)  Prep:  Split dose suprep  Meds/Allergies Reconciled?:  Physician Reviewed  Diagnosis with codes:    Gastroesophageal reflux K21.9  CRC screening Z12.11  Was patient informed to call insurance with codes (Y/N):  yes   Referral sent?:  Referral was sent at the time of electronic surgical scheduling. [facility] or [id number]  notified?:  I sent an electronic request to Endo Scheduling and received a confirmation today. Medication Orders:  Pt is aware to NOT take iron pills, herbal meds and diet supplements for 7 days before exam.   Hold clopidogrel for 5 days preceding the procedure. Also to NOT take any form of alcohol, recreational drugs and any forms of ED meds 24 hours before exam.   Misc Orders:  N/A   Further instructions given by staff:  I discussed the prep instructions with the patient which he verbally understood. I provided patient with prep instruction's sheet in office. Patient was informed about the new cancellation policy for his/her procedure. Patient was also given a copy of the cancellation policy at the time of the appointment and verbalized understanding.

## 2023-08-17 ENCOUNTER — OFFICE VISIT (OUTPATIENT)
Dept: OTOLARYNGOLOGY | Facility: CLINIC | Age: 68
End: 2023-08-17

## 2023-08-17 ENCOUNTER — TELEPHONE (OUTPATIENT)
Dept: CARDIOLOGY | Age: 68
End: 2023-08-17

## 2023-08-17 ENCOUNTER — LAB SERVICES (OUTPATIENT)
Dept: LAB | Age: 68
End: 2023-08-17

## 2023-08-17 VITALS — WEIGHT: 185 LBS | BODY MASS INDEX: 25.06 KG/M2 | HEIGHT: 72 IN

## 2023-08-17 DIAGNOSIS — L29.9 EAR ITCH: Primary | ICD-10-CM

## 2023-08-17 DIAGNOSIS — R93.1 ELEVATED CORONARY ARTERY CALCIUM SCORE: ICD-10-CM

## 2023-08-17 DIAGNOSIS — E78.00 PURE HYPERCHOLESTEROLEMIA: ICD-10-CM

## 2023-08-17 DIAGNOSIS — Z82.49 FAMILY HISTORY OF ABDOMINAL AORTIC ANEURYSM (AAA): ICD-10-CM

## 2023-08-17 DIAGNOSIS — I10 ESSENTIAL HYPERTENSION: ICD-10-CM

## 2023-08-17 DIAGNOSIS — R06.09 OTHER FORM OF DYSPNEA: ICD-10-CM

## 2023-08-17 LAB
ALBUMIN SERPL-MCNC: 3.9 G/DL (ref 3.6–5.1)
ALBUMIN/GLOB SERPL: 1.4 {RATIO} (ref 1–2.4)
ALP SERPL-CCNC: 74 UNITS/L (ref 45–117)
ALT SERPL-CCNC: 29 UNITS/L
ANION GAP SERPL CALC-SCNC: 7 MMOL/L (ref 7–19)
AST SERPL-CCNC: 18 UNITS/L
BILIRUB SERPL-MCNC: 2 MG/DL (ref 0.2–1)
BUN SERPL-MCNC: 17 MG/DL (ref 6–20)
BUN/CREAT SERPL: 16 (ref 7–25)
CALCIUM SERPL-MCNC: 9 MG/DL (ref 8.4–10.2)
CHLORIDE SERPL-SCNC: 108 MMOL/L (ref 97–110)
CHOLEST SERPL-MCNC: 149 MG/DL
CHOLEST/HDLC SERPL: 2.3 {RATIO}
CO2 SERPL-SCNC: 31 MMOL/L (ref 21–32)
CREAT SERPL-MCNC: 1.04 MG/DL (ref 0.67–1.17)
DEPRECATED RDW RBC: 45 FL (ref 39–50)
EGFRCR SERPLBLD CKD-EPI 2021: 78 ML/MIN/{1.73_M2}
ERYTHROCYTE [DISTWIDTH] IN BLOOD: 13 % (ref 11–15)
FASTING DURATION TIME PATIENT: ABNORMAL H
GLOBULIN SER-MCNC: 2.8 G/DL (ref 2–4)
GLUCOSE SERPL-MCNC: 99 MG/DL (ref 70–99)
HCT VFR BLD CALC: 44.9 % (ref 39–51)
HDLC SERPL-MCNC: 66 MG/DL
HGB BLD-MCNC: 15.6 G/DL (ref 13–17)
LDLC SERPL CALC-MCNC: 57 MG/DL
MAGNESIUM SERPL-MCNC: 2.1 MG/DL (ref 1.7–2.4)
MCH RBC QN AUTO: 32.7 PG (ref 26–34)
MCHC RBC AUTO-ENTMCNC: 34.7 G/DL (ref 32–36.5)
MCV RBC AUTO: 94.1 FL (ref 78–100)
NONHDLC SERPL-MCNC: 83 MG/DL
NRBC BLD MANUAL-RTO: 0 /100 WBC
PLATELET # BLD AUTO: 212 K/MCL (ref 140–450)
POTASSIUM SERPL-SCNC: 4.2 MMOL/L (ref 3.4–5.1)
PROT SERPL-MCNC: 6.7 G/DL (ref 6.4–8.2)
RBC # BLD: 4.77 MIL/MCL (ref 4.5–5.9)
SODIUM SERPL-SCNC: 142 MMOL/L (ref 135–145)
TRIGL SERPL-MCNC: 132 MG/DL
WBC # BLD: 6 K/MCL (ref 4.2–11)

## 2023-08-17 PROCEDURE — 85027 COMPLETE CBC AUTOMATED: CPT | Performed by: INTERNAL MEDICINE

## 2023-08-17 PROCEDURE — 80053 COMPREHEN METABOLIC PANEL: CPT | Performed by: INTERNAL MEDICINE

## 2023-08-17 PROCEDURE — 99203 OFFICE O/P NEW LOW 30 MIN: CPT | Performed by: OTOLARYNGOLOGY

## 2023-08-17 PROCEDURE — 3008F BODY MASS INDEX DOCD: CPT | Performed by: OTOLARYNGOLOGY

## 2023-08-17 PROCEDURE — 36415 COLL VENOUS BLD VENIPUNCTURE: CPT | Performed by: INTERNAL MEDICINE

## 2023-08-17 PROCEDURE — 83735 ASSAY OF MAGNESIUM: CPT | Performed by: INTERNAL MEDICINE

## 2023-08-17 PROCEDURE — 80061 LIPID PANEL: CPT | Performed by: INTERNAL MEDICINE

## 2023-08-17 RX ORDER — PREDNISOLONE ACETATE 10 MG/ML
SUSPENSION/ DROPS OPHTHALMIC
COMMUNITY
Start: 2023-03-31

## 2023-08-17 RX ORDER — MONTELUKAST SODIUM 10 MG/1
10 TABLET ORAL NIGHTLY
Qty: 30 TABLET | Refills: 3 | Status: SHIPPED | OUTPATIENT
Start: 2023-08-17

## 2023-08-17 RX ORDER — BETAMETHASONE DIPROPIONATE 0.5 MG/G
OINTMENT TOPICAL
Qty: 15 G | Refills: 0 | Status: SHIPPED | OUTPATIENT
Start: 2023-08-17

## 2023-08-17 RX ORDER — HYDROCHLOROTHIAZIDE 12.5 MG/1
12.5 TABLET ORAL DAILY
COMMUNITY
Start: 2023-07-13

## 2023-08-17 RX ORDER — LORATADINE 10 MG/1
10 TABLET ORAL DAILY
Qty: 30 TABLET | Refills: 3 | Status: SHIPPED | OUTPATIENT
Start: 2023-08-17

## 2023-08-17 NOTE — PROGRESS NOTES
Subjective:   Patient ID: Leslie Maza is a 69year old male. KATHRIN Almazan presents today in follow-up. He was formally under the care of of Dr. Jv Islas who has since retired. He last saw WANG Barth in July 2021. As per previous notes the patient has a history of chronic gastroesophageal reflux managed with pantoprazole twice daily. He has not had a prior endoscopy. The patient's last colonoscopy appears to have been performed by Dr. Jv Islas in December 2008 for rectal bleeding. The examination was normal with the exception of diverticulosis and internal hemorrhoids. Notes allude to scheduling a screening colonoscopy in 2015, however, I do not see that this was done. The patient states that he would have had the procedure performed at [facility].    The patient states that his appetite is good. His weight is reasonably stable. He still experiences \"some level of acid reflux\". He describes the feeling of mucus in his throat requiring throat clearing. This may happen a few times a month for 1-2 days and may be related to increased alcohol intake. Over the past few months the patient has noted a feeling that drier foods \"do not go down as easily\". He will drink water to assist.  This symptom is transient. No food impactions. He \"does not have much heartburn\". He denies abdominal pain. Bowel movements are regular without recent change. There is no history of melena or hematochezia. The patient does not smoke. He drinks 2 cups of coffee in the morning typically decaffeinated  He has 1-2 drinks a night up to 4 times weekly. 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 He has a family history of abdominal aortic aneurysms. He is under the care of of Dr. Hillary Carter. He is to have a follow-up abdominal ultrasound soon.       History/Other:   Review of Systems  See above    Wt Readings from Last 7 Encounters:  08/17/23 : 185 lb (83.9 kg)  08/16/23 : 184 lb (83.5 kg)  07/12/23 : 194 lb (88 kg)  10/18/22 : 183 lb (83 kg)  07/28/21 : 190 lb (86.2 kg)  08/15/20 : 190 lb (86.2 kg)  03/12/20 : 208 lb (94.3 kg)      Current Outpatient Medications   Medication Sig Dispense Refill    Na Sulfate-K Sulfate-Mg Sulf (SUPREP BOWEL PREP KIT) 17.5-3.13-1.6 GM/177ML Oral Solution Take as directed 1 each 0    pantoprazole 40 MG Oral Tab EC Take 1 tablet (40 mg total) by mouth 2 (two) times daily before meals. 180 tablet 0    neomycin-polymyxin-hydrocortisone 3.5-30463-4 Otic Suspension Place 4 drops into the left ear daily. valsartan 160 MG Oral Tab Take 1 tablet (160 mg total) by mouth daily. Clopidogrel Bisulfate 75 MG Oral Tab Take 1 tablet (75 mg total) by mouth daily. 30 tablet 0    ATORVASTATIN CALCIUM 10 MG Oral Tab TAKE ONE TABLET BY MOUTH EVERY DAY 20 tablet 0    hydroCHLOROthiazide 12.5 MG Oral Tab Take 1 tablet (12.5 mg total) by mouth daily. prednisoLONE 1 % Ophthalmic Suspension INSTILL ONE DROP INTO THE RIGHT EYE FOUR TIMES DAILY       Allergies:  Penicillins             OTHER (SEE COMMENTS)    Comment:Other reaction(s): Hives/Skin Rash             CLASS, ?questionalble rash    Objective:   Physical Exam  Vitals and nursing note reviewed. Constitutional:       General: He is not in acute distress. Appearance: He is well-developed. He is not ill-appearing, toxic-appearing or diaphoretic. Comments: Healthy in appearance   HENT:      Mouth/Throat:      Pharynx: No oropharyngeal exudate. Eyes:      General: No scleral icterus. Conjunctiva/sclera: Conjunctivae normal.   Neck:      Thyroid: No thyromegaly. Cardiovascular:      Rate and Rhythm: Normal rate and regular rhythm. Heart sounds: Normal heart sounds. No murmur heard. Pulmonary:      Effort: Pulmonary effort is normal. No respiratory distress. Breath sounds: Normal breath sounds. No wheezing or rales.    Abdominal: General: Bowel sounds are normal. There is no distension. Palpations: Abdomen is soft. There is no mass. Tenderness: There is no abdominal tenderness. There is no guarding or rebound. Musculoskeletal:      Cervical back: Neck supple. Lymphadenopathy:      Cervical: No cervical adenopathy. Neurological:      Mental Status: He is alert and oriented to person, place, and time. Psychiatric:         Behavior: Behavior normal.       Component  Ref Range & Units 10/18/22 12:54 PM   Glucose  70 - 99 mg/dL 93   Sodium  136 - 145 mmol/L 141   Potassium  3.5 - 5.1 mmol/L 3.5   Chloride  98 - 112 mmol/L 105   CO2  21.0 - 32.0 mmol/L 30.0   Anion Gap  0 - 18 mmol/L 6   BUN  7 - 18 mg/dL 14   Creatinine  0.70 - 1.30 mg/dL 0.93   BUN/CREA Ratio  10.0 - 20.0 15.1   Calcium, Total  8.5 - 10.1 mg/dL 9.3   Calculated Osmolality  275 - 295 mOsm/kg 292   eGFR-Cr  >=60 mL/min/1.73m2 89   ALT  16 - 61 U/L 26   AST  15 - 37 U/L 13 Low    Alkaline Phosphatase  45 - 117 U/L 72   Bilirubin, Total  0.1 - 2.0 mg/dL 1.8   Total Protein  6.4 - 8.2 g/dL 6.9   Albumin  3.4 - 5.0 g/dL 3.9   Globulin  2.8 - 4.4 g/dL 3.0   A/G Ratio  1.0 - 2.0 1.3   Patient Fasting for CMP?  Yes   Resulting Agency [facility])     Component  Ref Range & Units 10/18/22 12:54 PM   WBC  4.0 - 11.0 x10(3) uL 6.3   RBC  3.80 - 5.80 x10(6)uL 4.86   HGB  13.0 - 17.5 g/dL 15.7   HCT  39.0 - 53.0 % 44.7   MCV  80.0 - 100.0 fL 92.0   MCH  26.0 - 34.0 pg 32.3   MCHC  31.0 - 37.0 g/dL 35.1   RDW-SD  35.1 - 46.3 fL 41.3   RDW  11.0 - 15.0 % 12.1   PLT  150.0 - 450.0 10(3)uL 201.0   Neutrophil Absolute Prelim  1.50 - 7.70 x10 (3) uL 3.63   Neutrophil Absolute  1.50 - 7.70 x10(3) uL 3.63   Lymphocyte Absolute  1.00 - 4.00 x10(3) uL 1.98   Monocyte Absolute  0.10 - 1.00 x10(3) uL 0.54   Eosinophil Absolute  0.00 - 0.70 x10(3) uL 0.08   Basophil Absolute  0.00 - 0.20 x10(3) uL 0.03   Immature Granulocyte Absolute  0.00 - 1.00 x10(3) uL 0.01   Neutrophil %  % 57.8   Lymphocyte %  % 31.6   Monocyte %  % 8.6   Eosinophil %  % 1.3   Basophil %  % 0.5   Immature Granulocyte %  % 0.2   Resulting Agency [facility])              Specimen Collected: 10/18/22 12:54 PM Last Resulted: 10/18/22  4:47 PM             Assessment & Plan:   1. Gastroesophageal reflux disease, unspecified whether esophagitis present  The patient has a history of chronic gastroesophageal reflux (with ENT symptoms) managed with pantoprazole twice daily. He has episodic phlegm in the throat which could be due to gastroesophageal reflux versus postnasal drip. He has noted some difficulty swallowing solid foods and eosinophilic esophagitis with or without a stricture/ring should be excluded. Likewise James's esophagus or less likely neoplasm should be excluded. I am recommending that the patient have an upper endoscopy with monitored anesthesia care to evaluate. He is agreeable to proceed. In the interim pantoprazole can be continued. 2. Screening for colorectal cancer  The patient is asymptomatic from a lower gastrointestinal tract standpoint and of average risk for colon cancer. The last colonoscopy that I can see in the medical record was performed in 2008. He saw Dr. Monie Watson for colorectal cancer screening in 2015, however, there are no subsequent colonoscopy reports in the [place] system or in [facility]. I am therefore recommending that we proceed with a screening colonoscopy at this time. The rationale for this procedure, its nature, limitations and risks including bleeding and perforation requiring surgery was discussed. The risks of delayed diagnosis if testing is not performed was discussed as well. The patient is agreeable to proceeding with a colonoscopy which will be arranged at the time of his upper endoscopy following a split dose Suprep (may substitute a 4 L PEG lavage) and monitored anesthesia care.   The patient will hold the clopidogrel for 5 days preceding the procedure.         Meds This Visit:  Requested Prescriptions     Signed Prescriptions Disp Refills    Na Sulfate-K Sulfate-Mg Sulf (SUPREP BOWEL PREP KIT) 17.5-3.13-1.6 GM/177ML Oral Solution 1 each 0     Sig: Take as directed       Imaging & Referrals:  None

## 2023-09-14 ENCOUNTER — MED REC SCAN ONLY (OUTPATIENT)
Dept: FAMILY MEDICINE CLINIC | Facility: CLINIC | Age: 68
End: 2023-09-14

## 2023-09-22 RX ORDER — CLOPIDOGREL BISULFATE 75 MG/1
TABLET ORAL
Qty: 90 TABLET | Refills: 0 | Status: SHIPPED | OUTPATIENT
Start: 2023-09-22

## 2023-10-02 ENCOUNTER — OFFICE VISIT (OUTPATIENT)
Dept: CARDIOLOGY | Age: 68
End: 2023-10-02

## 2023-10-02 VITALS
DIASTOLIC BLOOD PRESSURE: 74 MMHG | BODY MASS INDEX: 25.37 KG/M2 | WEIGHT: 187.28 LBS | HEART RATE: 85 BPM | HEIGHT: 72 IN | SYSTOLIC BLOOD PRESSURE: 118 MMHG

## 2023-10-02 DIAGNOSIS — I10 ESSENTIAL HYPERTENSION: ICD-10-CM

## 2023-10-02 DIAGNOSIS — R06.09 OTHER FORM OF DYSPNEA: ICD-10-CM

## 2023-10-02 DIAGNOSIS — R93.1 ELEVATED CORONARY ARTERY CALCIUM SCORE: ICD-10-CM

## 2023-10-02 DIAGNOSIS — Z82.49 FAMILY HISTORY OF ABDOMINAL AORTIC ANEURYSM (AAA): ICD-10-CM

## 2023-10-02 DIAGNOSIS — E78.00 PURE HYPERCHOLESTEROLEMIA: Primary | ICD-10-CM

## 2023-10-02 PROCEDURE — 3074F SYST BP LT 130 MM HG: CPT | Performed by: INTERNAL MEDICINE

## 2023-10-02 PROCEDURE — 99214 OFFICE O/P EST MOD 30 MIN: CPT | Performed by: INTERNAL MEDICINE

## 2023-10-02 PROCEDURE — 3078F DIAST BP <80 MM HG: CPT | Performed by: INTERNAL MEDICINE

## 2023-10-02 RX ORDER — ATORVASTATIN CALCIUM 10 MG/1
TABLET, FILM COATED ORAL
Qty: 90 TABLET | Refills: 1 | Status: SHIPPED | OUTPATIENT
Start: 2023-10-02

## 2023-10-02 SDOH — HEALTH STABILITY: PHYSICAL HEALTH: ON AVERAGE, HOW MANY MINUTES DO YOU ENGAGE IN EXERCISE AT THIS LEVEL?: 40 MIN

## 2023-10-02 SDOH — HEALTH STABILITY: PHYSICAL HEALTH: ON AVERAGE, HOW MANY DAYS PER WEEK DO YOU ENGAGE IN MODERATE TO STRENUOUS EXERCISE (LIKE A BRISK WALK)?: 4 DAYS

## 2023-10-02 ASSESSMENT — PATIENT HEALTH QUESTIONNAIRE - PHQ9
CLINICAL INTERPRETATION OF PHQ2 SCORE: NO FURTHER SCREENING NEEDED
SUM OF ALL RESPONSES TO PHQ9 QUESTIONS 1 AND 2: 0
2. FEELING DOWN, DEPRESSED OR HOPELESS: NOT AT ALL
SUM OF ALL RESPONSES TO PHQ9 QUESTIONS 1 AND 2: 0
1. LITTLE INTEREST OR PLEASURE IN DOING THINGS: NOT AT ALL

## 2023-10-09 RX ORDER — HYDROCHLOROTHIAZIDE 12.5 MG/1
TABLET ORAL
Qty: 90 TABLET | Refills: 3 | Status: SHIPPED | OUTPATIENT
Start: 2023-10-09

## 2023-10-16 ENCOUNTER — TELEPHONE (OUTPATIENT)
Facility: CLINIC | Age: 68
End: 2023-10-16

## 2023-10-16 NOTE — TELEPHONE ENCOUNTER
Patient requesting to reschedule 1/10/2024 CLN & EGD to 12/28/2023. Please call. Thank you. Body Location Override (Optional - Billing Will Still Be Based On Selected Body Map Location If Applicable): Superior mid forehead Detail Level: Detailed Initial Decision For Superficial Radiation Therapy?: No Diagnosis: basal cell carcinoma Number Of Treatment Areas: 1 Anticipated Number Of Blocks: 0 Anticipated Blocking Type: simple Port Type: convergent Estimated Treatments: 12 Estimated Treatment Interval (Days): 2 Modality (Optional): eBx Starting Kill[place]olts (Optional): [address] Starting Mm Of Aluminum (Optional): no Al Starting Cone Type (Optional): 2 cm Cone Rationale: As part of the initial visit, the various treatment options for skin cancer removal were reviewed with the patient in detail. These include MOHS surgery, excisional surgery, curettage and electrodessication, radiation therapy, and various topical therapies. Given the indications, tumor type, patient preferences and location, the patient has agreed to proceed with XRT. Consent: Written consent obtained. The risks and benefits of XRT therapy were discussed in detail. Specifically, the risks of infection, scarring, bleeding, radiation dermatitis, prolonged wound healing, incomplete removal,nerve injury, inability to clear the tumor, and recurrence were addressed. The treatment site was clearly identified and confirmed by the patient.

## 2023-10-16 NOTE — TELEPHONE ENCOUNTER
Called patient about rescheduling to 12/28/2023 I informed the patient 12/28 is booked patient wanted to be added to the waiting list

## 2023-11-06 RX ORDER — VALSARTAN 160 MG/1
TABLET ORAL
Qty: 90 TABLET | Refills: 0 | Status: SHIPPED | OUTPATIENT
Start: 2023-11-06

## 2023-11-21 ENCOUNTER — HOSPITAL ENCOUNTER (OUTPATIENT)
Age: 68
Discharge: HOME | End: 2023-11-21
Payer: COMMERCIAL

## 2023-11-21 VITALS
SYSTOLIC BLOOD PRESSURE: 145 MMHG | HEART RATE: 75 BPM | OXYGEN SATURATION: 98 % | TEMPERATURE: 97 F | DIASTOLIC BLOOD PRESSURE: 83 MMHG | RESPIRATION RATE: 20 BRPM

## 2023-11-21 DIAGNOSIS — J01.00 ACUTE NON-RECURRENT MAXILLARY SINUSITIS: Primary | ICD-10-CM

## 2023-11-21 LAB — SARS-COV-2 RNA RESP QL NAA+PROBE: NOT DETECTED

## 2023-11-21 PROCEDURE — 99213 OFFICE O/P EST LOW 20 MIN: CPT

## 2023-11-21 PROCEDURE — 99203 OFFICE O/P NEW LOW 30 MIN: CPT

## 2023-11-21 RX ORDER — DOXYCYCLINE HYCLATE 100 MG/1
100 CAPSULE ORAL 2 TIMES DAILY
Qty: 14 CAPSULE | Refills: 0 | Status: SHIPPED | OUTPATIENT
Start: 2023-11-21 | End: 2023-11-28

## 2023-11-21 NOTE — ED INITIAL ASSESSMENT (HPI)
Patient reports sinus pain, pressure and discharge for the last few days. Reports similar symptoms in the past with sinus infections.   Denies fevers, chills

## 2023-11-29 ENCOUNTER — TELEPHONE (OUTPATIENT)
Facility: CLINIC | Age: 68
End: 2023-11-29

## 2023-11-29 NOTE — TELEPHONE ENCOUNTER
Patient was wondering if there is sooner date than 1/10/2024 CLN & EGD - is there anything avail in December 2023? Please call. Thank you.

## 2023-12-01 RX ORDER — PANTOPRAZOLE SODIUM 40 MG/1
40 TABLET, DELAYED RELEASE ORAL
Qty: 180 TABLET | Refills: 3 | Status: SHIPPED | OUTPATIENT
Start: 2023-12-01

## 2023-12-01 NOTE — TELEPHONE ENCOUNTER
Requested Prescriptions     Pending Prescriptions Disp Refills    PANTOPRAZOLE 40 MG Oral Tab EC [Pharmacy Med Name: Pantoprazole Sodium Ec 40 Mg Tab Auro] 180 tablet 0     Sig: Take 1 tablet by mouth 2 times daily before meals.      LOV: 08/16/2023  Last Refill: 08/10/2023

## 2023-12-07 ENCOUNTER — MED REC SCAN ONLY (OUTPATIENT)
Dept: FAMILY MEDICINE CLINIC | Facility: CLINIC | Age: 68
End: 2023-12-07

## 2023-12-14 ENCOUNTER — HOSPITAL ENCOUNTER (OUTPATIENT)
Age: 68
Discharge: HOME | End: 2023-12-14
Payer: COMMERCIAL

## 2023-12-14 VITALS
TEMPERATURE: 98 F | HEART RATE: 81 BPM | DIASTOLIC BLOOD PRESSURE: 77 MMHG | RESPIRATION RATE: 20 BRPM | SYSTOLIC BLOOD PRESSURE: 124 MMHG | OXYGEN SATURATION: 98 %

## 2023-12-14 DIAGNOSIS — J32.0 CHRONIC MAXILLARY SINUSITIS: Primary | ICD-10-CM

## 2023-12-14 PROCEDURE — 99214 OFFICE O/P EST MOD 30 MIN: CPT

## 2023-12-14 PROCEDURE — 99213 OFFICE O/P EST LOW 20 MIN: CPT

## 2023-12-14 RX ORDER — LEVOFLOXACIN 500 MG/1
500 TABLET, FILM COATED ORAL DAILY
Qty: 10 TABLET | Refills: 0 | Status: SHIPPED | OUTPATIENT
Start: 2023-12-14 | End: 2023-12-24

## 2023-12-14 NOTE — ED INITIAL ASSESSMENT (HPI)
Patient reports sinus congestion for the past month. Was seen in the ic one month ago and prescribed doxycycline. States symptoms somewhat improved after the doxycycline but never resolved. States symptoms worsened over the last week. Reports nose bleed 2 nights ago.

## 2023-12-23 ENCOUNTER — HOSPITAL ENCOUNTER (OUTPATIENT)
Age: 68
Discharge: HOME | End: 2023-12-23
Attending: EMERGENCY MEDICINE
Payer: COMMERCIAL

## 2023-12-23 ENCOUNTER — APPOINTMENT (OUTPATIENT)
Dept: GENERAL RADIOLOGY | Age: 68
End: 2023-12-23
Attending: EMERGENCY MEDICINE
Payer: COMMERCIAL

## 2023-12-23 VITALS
HEART RATE: 93 BPM | RESPIRATION RATE: 20 BRPM | TEMPERATURE: 98 F | DIASTOLIC BLOOD PRESSURE: 78 MMHG | SYSTOLIC BLOOD PRESSURE: 136 MMHG | OXYGEN SATURATION: 98 %

## 2023-12-23 DIAGNOSIS — J06.9 UPPER RESPIRATORY TRACT INFECTION, UNSPECIFIED TYPE: Primary | ICD-10-CM

## 2023-12-23 LAB
#MXD IC: 0.6 X10ˆ3/UL (ref 0.1–1)
ATRIAL RATE: 76 BPM
BUN BLD-MCNC: 17 MG/DL (ref 7–18)
CHLORIDE BLD-SCNC: 103 MMOL/L (ref 98–112)
CO2 BLD-SCNC: 27 MMOL/L (ref 21–32)
CREAT BLD-MCNC: 0.9 MG/DL
EGFRCR SERPLBLD CKD-EPI 2021: 92 ML/MIN/1.73M2 (ref 60–?)
GLUCOSE BLD-MCNC: 144 MG/DL (ref 70–99)
HCT VFR BLD AUTO: 39.2 %
HCT VFR BLD CALC: 40 %
HGB BLD-MCNC: 13.8 G/DL
ISTAT IONIZED CALCIUM FOR CHEM 8: 1.17 MMOL/L (ref 1.12–1.32)
LYMPHOCYTES # BLD AUTO: 2.2 X10ˆ3/UL (ref 1–4)
LYMPHOCYTES NFR BLD AUTO: 29.8 %
MCH RBC QN AUTO: 31.7 PG (ref 26–34)
MCHC RBC AUTO-ENTMCNC: 35.2 G/DL (ref 31–37)
MCV RBC AUTO: 90.1 FL (ref 80–100)
MIXED CELL %: 8.2 %
NEUTROPHILS # BLD AUTO: 4.6 X10ˆ3/UL (ref 1.5–7.7)
NEUTROPHILS NFR BLD AUTO: 62 %
P AXIS: 67 DEGREES
P-R INTERVAL: 160 MS
PLATELET # BLD AUTO: 197 X10ˆ3/UL (ref 150–450)
POTASSIUM BLD-SCNC: 4.2 MMOL/L (ref 3.6–5.1)
Q-T INTERVAL: 408 MS
QRS DURATION: 80 MS
QTC CALCULATION (BEZET): 459 MS
R AXIS: 60 DEGREES
RBC # BLD AUTO: 4.35 X10ˆ6/UL
SARS-COV-2 RNA RESP QL NAA+PROBE: NOT DETECTED
SODIUM BLD-SCNC: 141 MMOL/L (ref 136–145)
T AXIS: 27 DEGREES
TROPONIN I BLD-MCNC: <0.02 NG/ML
VENTRICULAR RATE: 76 BPM
WBC # BLD AUTO: 7.4 X10ˆ3/UL (ref 4–11)

## 2023-12-23 PROCEDURE — 71046 X-RAY EXAM CHEST 2 VIEWS: CPT | Performed by: EMERGENCY MEDICINE

## 2023-12-23 PROCEDURE — 99214 OFFICE O/P EST MOD 30 MIN: CPT

## 2023-12-23 PROCEDURE — 93010 ELECTROCARDIOGRAM REPORT: CPT

## 2023-12-23 PROCEDURE — 93005 ELECTROCARDIOGRAM TRACING: CPT

## 2023-12-23 PROCEDURE — 93010 ELECTROCARDIOGRAM REPORT: CPT | Performed by: INTERNAL MEDICINE

## 2023-12-23 PROCEDURE — 80047 BASIC METABLC PNL IONIZED CA: CPT

## 2023-12-23 PROCEDURE — 36415 COLL VENOUS BLD VENIPUNCTURE: CPT

## 2023-12-23 PROCEDURE — 84484 ASSAY OF TROPONIN QUANT: CPT

## 2023-12-23 PROCEDURE — 85025 COMPLETE CBC W/AUTO DIFF WBC: CPT | Performed by: EMERGENCY MEDICINE

## 2023-12-23 PROCEDURE — 99215 OFFICE O/P EST HI 40 MIN: CPT

## 2023-12-23 RX ORDER — CODEINE PHOSPHATE AND GUAIFENESIN 10; 100 MG/5ML; MG/5ML
5 SOLUTION ORAL EVERY 6 HOURS PRN
Qty: 180 ML | Refills: 0 | Status: SHIPPED | OUTPATIENT
Start: 2023-12-23

## 2023-12-23 NOTE — ED INITIAL ASSESSMENT (HPI)
Patient arrives ambulatory with c/o intermittent \"sick\" for the past month. Reports just having finished levaquin for a sinus infection. Reports wife tested positive for covid on Tuesday. Reports having started having a cough x couple days.

## 2024-01-02 NOTE — PAT NURSING NOTE
Location, date and time of procedure verified with patient who verbalized understanding. All questions answered at this time.

## 2024-01-10 ENCOUNTER — HOSPITAL ENCOUNTER (OUTPATIENT)
Age: 69
Setting detail: HOSPITAL OUTPATIENT SURGERY
Discharge: HOME | End: 2024-01-10
Attending: INTERNAL MEDICINE | Admitting: INTERNAL MEDICINE
Payer: COMMERCIAL

## 2024-01-10 ENCOUNTER — ANESTHESIA EVENT (OUTPATIENT)
Dept: ENDOSCOPY | Age: 69
End: 2024-01-10
Payer: COMMERCIAL

## 2024-01-10 ENCOUNTER — ANESTHESIA (OUTPATIENT)
Dept: ENDOSCOPY | Age: 69
End: 2024-01-10
Payer: COMMERCIAL

## 2024-01-10 VITALS
DIASTOLIC BLOOD PRESSURE: 79 MMHG | HEIGHT: 72 IN | WEIGHT: 183 LBS | HEART RATE: 66 BPM | RESPIRATION RATE: 15 BRPM | OXYGEN SATURATION: 98 % | BODY MASS INDEX: 24.79 KG/M2 | SYSTOLIC BLOOD PRESSURE: 124 MMHG

## 2024-01-10 DIAGNOSIS — K21.9 GASTROESOPHAGEAL REFLUX DISEASE, UNSPECIFIED WHETHER ESOPHAGITIS PRESENT: ICD-10-CM

## 2024-01-10 DIAGNOSIS — Z12.11 SCREENING FOR COLON CANCER: ICD-10-CM

## 2024-01-10 PROCEDURE — 45381 COLONOSCOPY SUBMUCOUS NJX: CPT | Performed by: INTERNAL MEDICINE

## 2024-01-10 PROCEDURE — 45380 COLONOSCOPY AND BIOPSY: CPT | Performed by: INTERNAL MEDICINE

## 2024-01-10 PROCEDURE — 88305 TISSUE EXAM BY PATHOLOGIST: CPT | Performed by: INTERNAL MEDICINE

## 2024-01-10 PROCEDURE — 45385 COLONOSCOPY W/LESION REMOVAL: CPT | Performed by: INTERNAL MEDICINE

## 2024-01-10 PROCEDURE — 43239 EGD BIOPSY SINGLE/MULTIPLE: CPT | Performed by: INTERNAL MEDICINE

## 2024-01-10 PROCEDURE — 43249 ESOPH EGD DILATION <30 MM: CPT | Performed by: INTERNAL MEDICINE

## 2024-01-10 PROCEDURE — 99070 SPECIAL SUPPLIES PHYS/QHP: CPT | Performed by: INTERNAL MEDICINE

## 2024-01-10 PROCEDURE — 88312 SPECIAL STAINS GROUP 1: CPT | Performed by: INTERNAL MEDICINE

## 2024-01-10 DEVICE — REPLAY HEMOSTASIS CLIP, 11MM SPAN
Type: IMPLANTABLE DEVICE | Status: FUNCTIONAL
Brand: REPLAY

## 2024-01-10 RX ORDER — LIDOCAINE HYDROCHLORIDE 10 MG/ML
INJECTION, SOLUTION EPIDURAL; INFILTRATION; INTRACAUDAL; PERINEURAL AS NEEDED
Status: DISCONTINUED | OUTPATIENT
Start: 2024-01-10 | End: 2024-01-10 | Stop reason: SURG

## 2024-01-10 RX ORDER — SODIUM CHLORIDE, SODIUM LACTATE, POTASSIUM CHLORIDE, CALCIUM CHLORIDE 600; 310; 30; 20 MG/100ML; MG/100ML; MG/100ML; MG/100ML
INJECTION, SOLUTION INTRAVENOUS CONTINUOUS
Status: DISCONTINUED | OUTPATIENT
Start: 2024-01-10 | End: 2024-01-10

## 2024-01-10 RX ADMIN — LIDOCAINE HYDROCHLORIDE 50 MG: 10 INJECTION, SOLUTION EPIDURAL; INFILTRATION; INTRACAUDAL; PERINEURAL at 09:02:00

## 2024-01-10 RX ADMIN — SODIUM CHLORIDE, SODIUM LACTATE, POTASSIUM CHLORIDE, CALCIUM CHLORIDE: 600; 310; 30; 20 INJECTION, SOLUTION INTRAVENOUS at 08:19:00

## 2024-01-10 RX ADMIN — LIDOCAINE HYDROCHLORIDE 50 MG: 10 INJECTION, SOLUTION EPIDURAL; INFILTRATION; INTRACAUDAL; PERINEURAL at 08:19:00

## 2024-01-10 NOTE — ANESTHESIA POSTPROCEDURE EVALUATION
Patient: Han Rangel    Procedure Summary       Date: 01/10/24 Room / Location: [facility] ENDOSCOPY 01 / [facility] ENDO    Anesthesia Start: 0819 Anesthesia Stop: 0917    Procedures:       COLONOSCOPY      ESOPHAGOGASTRODUODENOSCOPY (EGD) Diagnosis:       Gastroesophageal reflux disease, unspecified whether esophagitis present      Screening for colon cancer      (polyps, diverticulosis, anal polyp, hiatal hernia)    Surgeons: Bari Maedra MD Anesthesiologist: Luciano Prather MD    Anesthesia Type: MAC ASA Status: 2            Anesthesia Type: MAC    Vitals Value Taken Time   /77 01/10/24 0917   Temp  01/10/24 0917   Pulse 75 01/10/24 0917   Resp 22 01/10/24 0917   SpO2 94 % 01/10/24 0917       [facility] AN Post Evaluation:   Patient Evaluated in PACU  Patient Participation: waiting for patient participation  Pain Management: adequate  Airway Patency:patent  Yes    Cardiovascular Status: acceptable  Respiratory Status: acceptable and room air  Postoperative Hydration acceptable      Luciano Prather MD  1/10/2024 9:17 AM

## 2024-01-10 NOTE — DISCHARGE INSTRUCTIONS
Home Care Instructions for Colonoscopy and Gastroscopy with Sedation    Diet:  - Resume your regular diet as tolerated unless otherwise instructed.  - Start with light meals to minimize bloating.  - Do not drink alcohol today.    Medication:  - Ok to resume your Plavix tomorrow-Thursday, 1/11  -      If you have questions about resuming your normal medications, please contact your Primary Care Physician.    Activities:  - Take it easy today. Do not return to work today.  - Do not drive today.  - Do not operate any machinery today (including kitchen equipment).    Colonoscopy:  - You may notice some rectal \"spotting\" (a little blood on the toilet tissue) for a day or two after the exam. This is normal.  - If you experience any rectal bleeding (not spotting), persistent tenderness or sharp severe abdominal pains, oral temperature over 100 degrees Fahrenheit, light-headedness or dizziness, or any other problems, contact your doctor.    Gastroscopy:  -      Lower esophageal dilation to 18 mm completed on this exam.  -      You may have a sore throat for 2-3 days following the exam. This is normal. Gargling with warm salt water (1/2 tsp salt to 1 glass warm water) or using throat lozenges will help.  - If you experience any sharp pain in your neck, abdomen or chest, vomiting of blood, oral temperature over 100 degrees Fahrenheit, light-headedness or dizziness, or any other problems, contact your doctor.    **If unable to reach your doctor, please go to the [facility] Emergency Room**    - Your referring physician will receive a full report of your examination.  - If you do not hear from your doctor's office within two weeks of your biopsy, please call them for your results.    You may be able to see your laboratory results in NPM between 4 and 7 business days.  In some cases, your physician may not have viewed the results before they are released to NPM.  If you have questions regarding your results  contact the physician who ordered the test/exam by phone or via [employer] by choosing \"Ask a Medical Question.\"

## 2024-01-10 NOTE — OPERATIVE REPORT
[facility] Endoscopy Report      Date of Procedure:  01/10/24      Preoperative Diagnosis:  1.  Colorectal cancer screening  2.  Chronic gastroesophageal reflux      Postoperative Diagnosis:  1.  Colon polyps  2.  Sigmoid colon diverticulosis  3.  Hypertrophied anal papilla/fibroadenoma  4.  Hiatal hernia      Procedure:    Colonoscopy with polypectomy and biopsy  Esophagogastroduodenoscopy with biopsy and TTS balloon dilatation      Surgeon:  Bari Madera M.D.      Anesthesia:  Monitored anesthesia care  Cecal withdrawal time:  31 minutes  EBL:  Insignificant      Brief History:  This is a 68 year old male who presents for screening colonoscopy.  He has had no lower gastrointestinal tract symptoms or signs.  His last colonoscopy was at least 9 years prior.  The patient has a long standing history of gastroesophageal reflux managed with twice daily pantoprazole.  He also has mild difficulty swallowing solid foods.  A concurrent upper endoscopy is being performed to evaluate.      Technique:  After informed consent, the patient was placed in the left lateral recumbent position.  Digital rectal examination revealed no palpable intraluminal abnormalities with the exception of a mobile anal papilla.  The prostate was smooth and symmetric without nodules.  An Olympus variable stiffness 190 series HD colonoscope was inserted into the rectum and advanced under direct vision by following the lumen to the terminal ileum.  The colon was examined upon withdrawal in the left lateral recumbent position.    Following colonoscopy, an Olympus adult HD gastroscope was inserted into the hypopharynx and advanced under direct vision into the esophagus, stomach and duodenum.  The endoscope was withdrawn to the stomach where retroflexion of the angulus, body, cardia and fundus was performed.  The instrument was straightened, insufflated air and fluid were suctioned and the endoscope was withdrawn.  The  procedures were well tolerated without immediate complication.      Findings:  The preparation of the colon was excellent.  The terminal ileum was examined for several cm and visually normal.  The ileocecal valve was well preserved. The visualized colonic mucosa from the cecum to the anal verge was normal with an intact vascular pattern.  There were #6 polyps seen within the colon which removed as follows:    1.  In the proximal ascending colon there was a 1 cm sessile polyp.  Several cc of saline was injected submucosally underneath the polyp.  The polyp was excised using snare cautery in #1 fragment and retrieved via suction.  The site was closed with a solitary hemostatic clip.  2.  In the proximal ascending colon there was a 2-3 mm sessile polyp which was cold snare excised and retrieved.  3.  In the transverse colon there were #3 polyps measuring 3-7 mm in size.  #2 had a traditional adenomatous appearance and the third a serrated appearance.  All were all cold snare excised and retrieved.  4.  In the descending colon there was a 6-7 mm serrated polyp which was cold snare excised and retrieved.    Inspection of all sites revealed no evidence of ongoing bleeding.  There was a faint fern like vascular area in the cecum and an early vascular malformation cannot be excluded.  This was not bleeding and left undisturbed.  There were several diverticula seen in the sigmoid colon without current signs of complication.  There were no other colonic polyps, mass lesions, other vascular anomalies or signs of inflammation seen.  Retroflexion in the rectum revealed a 7 mm hypertrophied anal papilla/fibroadenoma which was biopsied.    The esophagus was normal without evidence of ulceration, erosion or James's esophagus.  Biopsies from the proximal esophagus at 25 cm in the distal esophagus a few centimeters above the gastroesophageal junction were obtained and placed in separate containers.  The junction did not distend  completely and a subtle ring cannot be excluded.  The GE junction and diaphragmatic impression were at 42/43 cm with a 1-1.5 cm sliding.  The stomach distended appropriately with insufflated air.  The mucosa of the stomach including cardia, fundus, gastric body and antrum was normal with the exception of a soft nodular pattern of the distal greater curvature likely related the patient's chronic proton pump inhibitor use.  Biopsies were obtained.  The duodenal bulb and post bulbar regions were normal.      Impression:  1.  Multiple colon polyps  2.  Uncomplicated sigmoid colon diverticulosis  3.  Hypertrophied anal papilla/fibroadenoma  4.  Nonerosive gastroesophageal reflux  5.  Small hiatal hernia  6.  Gastric mucosal changes likely secondary to chronic acid suppression  7.  Status post esophageal dilatation to 54 Divehi TTS without definitive stricture or ring.    Recommendations:  1.  Standard postprocedural instructions given.  2.  Resume clopidogrel tomorrow.  3.  Follow-up biopsy results.  4.  Further recommendations pending biopsy and clinical course.          Bari Madera MD  1/10/2024

## 2024-01-10 NOTE — ANESTHESIA PREPROCEDURE EVALUATION
Anesthesia PreOp Note    HPI:     Han Rangel is a 68 year old male who presents for preoperative consultation requested by: Bari Mdaera MD    Date of Surgery: 1/10/2024    Procedure(s):  COLONOSCOPY  ESOPHAGOGASTRODUODENOSCOPY (EGD)  Indication: Gastroesophageal reflux disease, unspecified whether esophagitis present /  Screening for colon cancer    Relevant Problems   No relevant active problems       NPO:  Last Liquid Consumption Date: 01/10/24  Last Liquid Consumption Time: 0430  Last Solid Consumption Date: 24  Last Solid Consumption Time: 0800  Last Liquid Consumption Date: 01/10/24          History Review:  Patient Active Problem List    Diagnosis Date Noted    Hypertension 2023    Gastroesophageal reflux disease 2017    Spinal stenosis of lumbar region 2014    Lumbosacral spondylosis 2014    Low back pain 2014    Sacroiliitis (HCC) 2014    Pure hypercholesterolemia 2014       Past Medical History:   Diagnosis Date    Calculus of kidney     High blood pressure     High cholesterol     Hyperlipidemia     Problems with swallowing        Past Surgical History:   Procedure Laterality Date    REMOVAL OF KIDNEY STONE         Medications Prior to Admission   Medication Sig Dispense Refill Last Dose    guaiFENesin-codeine 100-10 MG/5ML Oral Solution Take 5 mL by mouth every 6 (six) hours as needed for cough. 180 mL 0     pantoprazole 40 MG Oral Tab EC Take 1 tablet (40 mg total) by mouth 2 (two) times daily before meals. 180 tablet 3 2024    loratadine 10 MG Oral Tab Take 1 tablet (10 mg total) by mouth daily. 30 tablet 3     valsartan 160 MG Oral Tab Take 1 tablet (160 mg total) by mouth daily.   2024    Clopidogrel Bisulfate 75 MG Oral Tab Take 1 tablet (75 mg total) by mouth daily. 30 tablet 0     ATORVASTATIN CALCIUM 10 MG Oral Tab TAKE ONE TABLET BY MOUTH EVERY DAY 20 tablet 0 2024    [] levoFLOXacin 500 MG Oral Tab Take 1 tablet  (500 mg total) by mouth daily for 10 days. 10 tablet 0     [] doxycycline 100 MG Oral Cap Take 1 capsule (100 mg total) by mouth 2 (two) times daily for 7 days. 14 capsule 0     hydroCHLOROthiazide 12.5 MG Oral Tab Take 1 tablet (12.5 mg total) by mouth daily. (Patient not taking: Reported on 10/16/2023)       prednisoLONE 1 % Ophthalmic Suspension INSTILL ONE DROP INTO THE RIGHT EYE FOUR TIMES DAILY (Patient not taking: Reported on 10/16/2023)       Betamethasone Dipropionate Aug 0.05 % External Ointment Apply by topical route every day to the affected area(s); do not exceed 45 grams per week. (Patient not taking: Reported on 10/16/2023) 15 g 0     montelukast 10 MG Oral Tab Take 1 tablet (10 mg total) by mouth nightly. (Patient not taking: Reported on 10/16/2023) 30 tablet 3     Na Sulfate-K Sulfate-Mg Sulf (SUPREP BOWEL PREP KIT) 17.5-3.13-1.6 GM/177ML Oral Solution Take as directed 1 each 0     neomycin-polymyxin-hydrocortisone 3.5-95967-7 Otic Suspension Place 4 drops into the left ear daily.        Current Facility-Administered Medications Ordered in Epic   Medication Dose Route Frequency Provider Last Rate Last Admin    lactated ringers infusion   Intravenous Continuous Bari Madera MD         No current [facility]-ordered outpatient medications on file.       Allergies   Allergen Reactions    Penicillins OTHER (SEE COMMENTS)     Other reaction(s): Hives/Skin Rash  CLASS, ?questionalble rash       Family History   Problem Relation Age of Onset    Cancer Brother         lung (smoker)    Other (Other) Brother         AAA    Other (Other) Father         AAA     Social History     Socioeconomic History    Marital status:    Tobacco Use    Smoking status: Never    Smokeless tobacco: Never   Vaping Use    Vaping Use: Never used   Substance and Sexual Activity    Alcohol use: Yes     Alcohol/week: 3.0 standard drinks of alcohol     Types: 3 Standard drinks or equivalent per week     Comment:  socially    Drug use: No   Other Topics Concern    Caffeine Concern Yes     Comment: coffee-2 cups/day       Available pre-op labs reviewed.  Lab Results   Component Value Date    MCV 90.1 12/23/2023    MCHC 35.2 12/23/2023             Vital Signs:  Body mass index is 24.82 kg/m².   height is 1.829 m (6') and weight is 83 kg (183 lb). His blood pressure is 128/82 and his pulse is 78. His respiration is 16 and oxygen saturation is 97%.   Vitals:    01/02/24 1532 01/10/24 0803   BP:  128/82   Pulse:  78   Resp:  16   SpO2:  97%   Weight: 83.9 kg (185 lb) 83 kg (183 lb)   Height: 1.829 m (6') 1.829 m (6')        Anesthesia Evaluation     Patient summary reviewed and Nursing notes reviewed    No history of anesthetic complications   Airway   Mallampati: II  TM distance: >3 FB  Neck ROM: full  Dental - Dentition appears grossly intact     Pulmonary - negative ROS and normal exam   Cardiovascular - normal exam  (+) hypertension    ECG reviewed    Neuro/Psych      Comments: On Plavix for \"history of neuro events, possible TGA\" per notes. Stopped 6 days ago    GI/Hepatic/Renal    (+) GERD, bowel prep    Endo/Other - negative ROS   Abdominal                  Anesthesia Plan:   ASA:  2  Plan:   MAC  Plan Comments: I have discussed the anesthetic plan, major risks and alternatives with the patient and answered all questions. The patient desires to proceed with surgery and anesthesia as planned.     Plavix held 6 days  Chronic sinus congestion for months, no new/worsening, no fever/chills    Informed Consent Plan and Risks Discussed With:  Spouse and patient      I have informed Han Rangel and/or legal guardian or family member of the nature of the anesthetic plan, benefits, risks including possible dental damage if relevant, major complications, and any alternative forms of anesthetic management.   All of the patient's questions were answered to the best of my ability. The patient desires the anesthetic management as  planned.  Luciano Prather MD  1/10/2024 8:14 AM  Present on Admission:  **None**

## 2024-01-10 NOTE — H&P
History & Physical Examination    Patient Name: Han Rangel  MRN: [id number]  CSN: [id number]  YOB: 1955    Diagnosis: Colorectal cancer screening, chronic gastroesophageal reflux      Medications Prior to Admission   Medication Sig Dispense Refill Last Dose    guaiFENesin-codeine 100-10 MG/5ML Oral Solution Take 5 mL by mouth every 6 (six) hours as needed for cough. 180 mL 0     pantoprazole 40 MG Oral Tab EC Take 1 tablet (40 mg total) by mouth 2 (two) times daily before meals. 180 tablet 3 2024    loratadine 10 MG Oral Tab Take 1 tablet (10 mg total) by mouth daily. 30 tablet 3     valsartan 160 MG Oral Tab Take 1 tablet (160 mg total) by mouth daily.   2024    Clopidogrel Bisulfate 75 MG Oral Tab Take 1 tablet (75 mg total) by mouth daily. 30 tablet 0     ATORVASTATIN CALCIUM 10 MG Oral Tab TAKE ONE TABLET BY MOUTH EVERY DAY 20 tablet 0 2024    [] levoFLOXacin 500 MG Oral Tab Take 1 tablet (500 mg total) by mouth daily for 10 days. 10 tablet 0     [] doxycycline 100 MG Oral Cap Take 1 capsule (100 mg total) by mouth 2 (two) times daily for 7 days. 14 capsule 0     hydroCHLOROthiazide 12.5 MG Oral Tab Take 1 tablet (12.5 mg total) by mouth daily. (Patient not taking: Reported on 10/16/2023)       prednisoLONE 1 % Ophthalmic Suspension INSTILL ONE DROP INTO THE RIGHT EYE FOUR TIMES DAILY (Patient not taking: Reported on 10/16/2023)       Betamethasone Dipropionate Aug 0.05 % External Ointment Apply by topical route every day to the affected area(s); do not exceed 45 grams per week. (Patient not taking: Reported on 10/16/2023) 15 g 0     montelukast 10 MG Oral Tab Take 1 tablet (10 mg total) by mouth nightly. (Patient not taking: Reported on 10/16/2023) 30 tablet 3     Na Sulfate-K Sulfate-Mg Sulf (SUPREP BOWEL PREP KIT) 17.5-3.13-1.6 GM/177ML Oral Solution Take as directed 1 each 0     neomycin-polymyxin-hydrocortisone 3.5-40097-1 Otic Suspension Place 4 drops into the  left ear daily.        Current Facility-Administered Medications   Medication Dose Route Frequency    lactated ringers infusion   Intravenous Continuous     Facility-Administered Medications Ordered in Other Encounters   Medication Dose Route Frequency    lidocaine PF (Xylocaine-MPF) 1% injection   Intravenous PRN    propofol (Diprivan) 10 MG/ML injection   Intravenous PRN    propofol (Diprivan) 10 mg/mL infusion premix   Intravenous Continuous PRN       Allergies:   Allergies   Allergen Reactions    Penicillins OTHER (SEE COMMENTS)     Other reaction(s): Hives/Skin Rash  CLASS, ?questionalble rash       Past Medical History:   Diagnosis Date    Calculus of kidney     High blood pressure     High cholesterol     Hyperlipidemia     Problems with swallowing      Past Surgical History:   Procedure Laterality Date    REMOVAL OF KIDNEY STONE       Family History   Problem Relation Age of Onset    Cancer Brother         lung (smoker)    Other (Other) Brother         AAA    Other (Other) Father         AAA     Social History     Tobacco Use    Smoking status: Never    Smokeless tobacco: Never   Substance Use Topics    Alcohol use: Yes     Alcohol/week: 3.0 standard drinks of alcohol     Types: 3 Standard drinks or equivalent per week     Comment: socially       SYSTEM Check if Review is Normal Check if Physical Exam is Normal If not normal, please explain:   HEENT [X ] [ X]    NECK  [X ] [ X]    HEART [X ] [ X]    LUNGS [X ] [ X]    ABDOMEN [X ] [ X]    EXTREMITIES [X ] [ X]    OTHER        [ x ] I have discussed the risks and benefits and alternatives with the patient/family.  They understand and agree to proceed with plan of care.  [ x ] I have reviewed the History and Physical done within the last 30 days.  Any changes noted above.    Bari Madera MD  1/10/2024  8:22 AM

## 2024-01-11 ENCOUNTER — OFFICE VISIT (OUTPATIENT)
Dept: OTOLARYNGOLOGY | Facility: CLINIC | Age: 69
End: 2024-01-11
Payer: COMMERCIAL

## 2024-01-11 ENCOUNTER — TELEPHONE (OUTPATIENT)
Facility: CLINIC | Age: 69
End: 2024-01-11

## 2024-01-11 DIAGNOSIS — J32.9 CHRONIC SINUSITIS, UNSPECIFIED LOCATION: Primary | ICD-10-CM

## 2024-01-11 PROCEDURE — 99213 OFFICE O/P EST LOW 20 MIN: CPT | Performed by: OTOLARYNGOLOGY

## 2024-01-11 RX ORDER — LEVOFLOXACIN 500 MG/1
500 TABLET, FILM COATED ORAL EVERY 24 HOURS
Qty: 10 TABLET | Refills: 0 | Status: SHIPPED | OUTPATIENT
Start: 2024-01-11

## 2024-01-11 RX ORDER — METHYLPREDNISOLONE 4 MG/1
TABLET ORAL
Qty: 21 TABLET | Refills: 0 | Status: SHIPPED | OUTPATIENT
Start: 2024-01-11

## 2024-01-11 RX ORDER — PSEUDOEPHEDRINE HCL 120 MG/1
120 TABLET, FILM COATED, EXTENDED RELEASE ORAL EVERY 12 HOURS
Qty: 60 TABLET | Refills: 3 | Status: SHIPPED | OUTPATIENT
Start: 2024-01-11

## 2024-01-11 NOTE — PROGRESS NOTES
Han Rangel is a 68 year old male.    Chief Complaint   Patient presents with    Sinus Problem     Pt reports sinus infections x 2 months, finished 2nd round oral abx with no resolve.       HISTORY OF PRESENT ILLNESS    He presents with a long history of chronic ear infection on the left ear.  States that he has been told that he has swimmer's ear and he feels that he is had it for about 2 years.  Feels that his ears are always itchy primarily on the left side.  Now having some issues on the right side.  No pain no drainage no other signs, symptoms.  He does note some nasal congestion and postnasal drip that started a few days ago.  Feels like he might be developing a cold not having some throat symptoms with throat clearing and raspiness to his voice.  She has seen an ENT in the past and was told that he had ear infections treated with some eardrops.  He has routinely been using neomycin eardrops to deal with this process and even has been using them over the past few days.  Last dose was last night.  Also told by his ENT that he should use some oil drops for his ears to keep them healthy.  He does have a history of grinding his teeth but does not wear a bite guard.     1/11/24 he presents today with long history of chronic nasal sinus issues.  He has been seen by Dr. Saba at [facility] in the past treated with what sounds like irrigations and steroids.  Did well throughout most of the COVID years but states that he started developing problems about 2 months ago where he developed an acute sinus infection treated with doxycycline 7 days with some improvement in his symptoms.  States that by Stan time he felt that things had returned and was given a 7-day course of Levaquin which did seem to help with his nasal mucosal crusting and mucopurulence.  Now feels that things are not 100%.  He will be traveling to [place] and then to [place] for short period time and returning to [facility] in March or  April.  Currently on no medications except for Sudafed as needed    Social History     Socioeconomic History    Marital status:    Tobacco Use    Smoking status: Never    Smokeless tobacco: Never   Vaping Use    Vaping Use: Never used   Substance and Sexual Activity    Alcohol use: Yes     Alcohol/week: 3.0 standard drinks of alcohol     Types: 3 Standard drinks or equivalent per week     Comment: socially    Drug use: No   Other Topics Concern    Caffeine Concern Yes     Comment: coffee-2 cups/day       Family History   Problem Relation Age of Onset    Cancer Brother         lung (smoker)    Other (Other) Brother         AAA    Other (Other) Father         AAA       Past Medical History:   Diagnosis Date    Calculus of kidney     High blood pressure     High cholesterol     Hyperlipidemia     Problems with swallowing        Past Surgical History:   Procedure Laterality Date    COLONOSCOPY N/A 1/10/2024    Procedure: COLONOSCOPY;  Surgeon: Bari Madera MD;  Location: [facility] ENDO    REMOVAL OF KIDNEY STONE           REVIEW OF SYSTEMS    System Neg/Pos Details   Constitutional Negative Fatigue, fever and weight loss.   ENMT Negative Drooling.   Eyes Negative Blurred vision and vision changes.   Respiratory Negative Dyspnea and wheezing.   Cardio Negative Chest pain, irregular heartbeat/palpitations and syncope.   GI Negative Abdominal pain and diarrhea.   Endocrine Negative Cold intolerance and heat intolerance.   Neuro Negative Tremors.   Psych Negative Anxiety and depression.   Integumentary Negative Frequent skin infections, pigment change and rash.   Hema/Lymph Negative Easy bleeding and easy bruising.           PHYSICAL EXAM    There were no vitals taken for this visit.       Constitutional Normal Overall appearance - Normal.   Psychiatric Normal Orientation - Oriented to time, place, person & situation. Appropriate mood and affect.   Neck Exam Normal Inspection - Normal. Palpation - Normal. Parotid  gland - Normal. Thyroid gland - Normal.   Eyes Normal Conjunctiva - Right: Normal, Left: Normal. Pupil - Right: Normal, Left: Normal. Fundus - Right: Normal, Left: Normal.   Neurological Normal Memory - Normal. Cranial nerves - Cranial nerves II through XII grossly intact.   Head/Face Normal Facial features - Normal. Eyebrows - Normal. Skull - Normal.        Nasopharynx Normal External nose - Normal. Lips/teeth/gums - Normal. Tonsils - Normal. Oropharynx - Normal.   Ears Normal Inspection - Right: Normal, Left: Normal. Canal - Right: Normal, Left: Normal. TM - Right: Normal, Left: Normal.   Skin Normal Inspection - Normal.        Lymph Detail Normal Submental. Submandibular. Anterior cervical. Posterior cervical. Supraclavicular.        Nose/Mouth/Throat Normal External nose - Normal. Lips/teeth/gums - Normal. Tonsils - Normal. Oropharynx - Normal.   Nose/Mouth/Throat Normal Nares - Right: Normal Left: Normal. Septum - slight deviation to the left.  Dry mucosa bilaterally Turbinates - Right: Normal, Left: Normal.       Current Outpatient Medications:     pantoprazole 40 MG Oral Tab EC, Take 1 tablet (40 mg total) by mouth 2 (two) times daily before meals., Disp: 180 tablet, Rfl: 3    hydroCHLOROthiazide 12.5 MG Oral Tab, Take 1 tablet (12.5 mg total) by mouth daily., Disp: , Rfl:     montelukast 10 MG Oral Tab, Take 1 tablet (10 mg total) by mouth nightly., Disp: 30 tablet, Rfl: 3    loratadine 10 MG Oral Tab, Take 1 tablet (10 mg total) by mouth daily., Disp: 30 tablet, Rfl: 3    valsartan 160 MG Oral Tab, Take 1 tablet (160 mg total) by mouth daily., Disp: , Rfl:     Clopidogrel Bisulfate 75 MG Oral Tab, Take 1 tablet (75 mg total) by mouth daily., Disp: 30 tablet, Rfl: 0    ATORVASTATIN CALCIUM 10 MG Oral Tab, TAKE ONE TABLET BY MOUTH EVERY DAY, Disp: 20 tablet, Rfl: 0    guaiFENesin-codeine 100-10 MG/5ML Oral Solution, Take 5 mL by mouth every 6 (six) hours as needed for cough. (Patient not taking: Reported on  1/11/2024), Disp: 180 mL, Rfl: 0  ASSESSMENT AND PLAN    1. Chronic sinusitis, unspecified location  Appears to have some level of chronic sinusitis.  I have asked him to start Sudafed Medrol Dosepak Levaquin for another 7 days and Vaseline after nasal irrigations.  We will hold off on nasal sprays due to potential for nosebleed.  Return to see me as needed.  He will be traveling to [place] and also [place] in the next few months.        This note was prepared using Dragon Medical voice recognition dictation software. As a result errors may occur. When identified these errors have been corrected. While every attempt is made to correct errors during dictation discrepancies may still exist    Finesse Bravo MD    1/11/2024    8:06 AM

## 2024-01-11 NOTE — TELEPHONE ENCOUNTER
Health maintenance updated.  3 year colonoscopy recall placed in patient outreach.Next due on 01/10/2027 per Dr. Madera.

## 2024-01-11 NOTE — TELEPHONE ENCOUNTER
----- Message from Bari Madera MD sent at 1/11/2024  4:31 PM CST -----  I spoke to Han.  He is feeling well.  He had #6 adenomatous polyps removed including a 1 cm adenoma.  Uncomplicated diverticulosis was present.  The anal biopsy was negative.  I have recommended a high-fiber diet for diverticulosis and a surveillance colonoscopy in 3 years.  The upper endoscopic findings revealed minimal esophageal inflammation, a gastric fundic gland polyp without intestinal metaplasia or H. pylori.  There was a small hiatal hernia.  I do not feel that the patient has refractory acid reflux.  I cannot exclude LPR or non-GI causes for his throat symptoms.  There are no endoscopic findings that would mandate high-dose PPI therapy.  He may decrease the pantoprazole to once daily and assess any change in symptoms.  He may use on-demand Mylanta.    GI RNs: Please enter colonoscopy recall for 3 years.

## 2024-02-21 RX ORDER — CLOPIDOGREL BISULFATE 75 MG/1
TABLET ORAL
Qty: 90 TABLET | Refills: 0 | Status: SHIPPED | OUTPATIENT
Start: 2024-02-21

## 2024-03-25 RX ORDER — VALSARTAN 160 MG/1
TABLET ORAL
Qty: 90 TABLET | Refills: 1 | Status: SHIPPED | OUTPATIENT
Start: 2024-03-25

## 2024-03-25 RX ORDER — ATORVASTATIN CALCIUM 10 MG/1
TABLET, FILM COATED ORAL
Qty: 90 TABLET | Refills: 1 | Status: SHIPPED | OUTPATIENT
Start: 2024-03-25

## 2024-03-29 ENCOUNTER — APPOINTMENT (OUTPATIENT)
Dept: GENERAL RADIOLOGY | Age: 69
End: 2024-03-29
Attending: PHYSICIAN ASSISTANT
Payer: COMMERCIAL

## 2024-03-29 ENCOUNTER — HOSPITAL ENCOUNTER (OUTPATIENT)
Age: 69
Discharge: HOME | End: 2024-03-29
Payer: COMMERCIAL

## 2024-03-29 VITALS
RESPIRATION RATE: 16 BRPM | SYSTOLIC BLOOD PRESSURE: 124 MMHG | OXYGEN SATURATION: 96 % | TEMPERATURE: 99 F | HEART RATE: 98 BPM | DIASTOLIC BLOOD PRESSURE: 69 MMHG

## 2024-03-29 DIAGNOSIS — S46.911A ELBOW STRAIN, RIGHT, INITIAL ENCOUNTER: Primary | ICD-10-CM

## 2024-03-29 DIAGNOSIS — M70.21 OLECRANON BURSITIS OF RIGHT ELBOW: ICD-10-CM

## 2024-03-29 PROCEDURE — 99213 OFFICE O/P EST LOW 20 MIN: CPT

## 2024-03-29 PROCEDURE — 73080 X-RAY EXAM OF ELBOW: CPT | Performed by: PHYSICIAN ASSISTANT

## 2024-03-29 NOTE — ED PROVIDER NOTES
Chief Complaint   Patient presents with    Arm or Hand Injury    Swelling       HPI:     Han Rangel is a 68 year old male who presents for evaluation of right elbow pain over the last few weeks.  Notes mechanical fall at onset over a month ago without medical evaluation or imaging landing on the right elbow.  Notes pain maximum, 5 out of 10, taking periodic Advil with mild improvement.  Notes developing swelling along the posterior elbow within the last week.  Denies reinjury, fevers chills numbness tingling redness or warmth.      PFSH    PFSH asessment screens reviewed and agree.  Nurses notes reviewed I agree with documentation.    Family History   Problem Relation Age of Onset    Cancer Brother         lung (smoker)    Other (Other) Brother         AAA    Other (Other) Father         AAA     Family history reviewed with patient/caregiver and is not pertinent to presenting problem.  Social History     Socioeconomic History    Marital status:      Spouse name: Not on file    Number of children: Not on file    Years of education: Not on file    Highest education level: Not on file   Occupational History    Not on file   Tobacco Use    Smoking status: Never    Smokeless tobacco: Never   Vaping Use    Vaping Use: Never used   Substance and Sexual Activity    Alcohol use: Yes     Alcohol/week: 3.0 standard drinks of alcohol     Types: 3 Standard drinks or equivalent per week     Comment: socially    Drug use: No    Sexual activity: Not on file   Other Topics Concern     Service Not Asked    Blood Transfusions Not Asked    Caffeine Concern Yes     Comment: coffee-2 cups/day    Occupational Exposure Not Asked    Hobby Hazards Not Asked    Sleep Concern Not Asked    Stress Concern Not Asked    Weight Concern Not Asked    Special Diet Not Asked    Back Care Not Asked    Exercise Not Asked    Bike Helmet Not Asked    Seat Belt Not Asked    Self-Exams Not Asked   Social History Narrative    Not on file      Social Determinants of Health     Financial Resource Strain: Not on file   Food Insecurity: Not on file   Transportation Needs: Not on file   Physical Activity: Not on file   Stress: Not on file   Social Connections: Not on file   Housing Stability: Not on file         ROS:   Positive for stated complaint: Elbow pain.  All other systems reviewed and negative except as noted above.  Constitutional and Vital Signs Reviewed.      Physical Exam:     Findings:    /69   Pulse 98   Temp 98.5 °F (36.9 °C) (Temporal)   Resp 16   SpO2 96%   GENERAL: well developed, well nourished, well hydrated, no distress  SKIN: good skin turgor, no obvious rashes  EXTREMITIES: Bursitis along the right posterior elbow without associated erythema skin tear or warmth.  Normal active range of motion of elbow.  Mild tenderness along the right proximal lateral forearm.  No edema.  Radial pulse and distal sensation intact.  QUINTANILLA without difficulty  GI: soft, non-tender, normal bowel sounds  HEAD: normocephalic, atraumatic  EYES: sclera non icteric bilateral,   NEURO: No focal deficits  PSYCH: Alert and oriented x3.  Answering questions appropriately.  Mood appropriate.    MDM/Assessment/Plan:   Orders for this encounter:    Orders Placed This Encounter    XR ELBOW, COMPLETE (MIN 3 VIEWS), RIGHT (CPT=73080)     Order Specific Question:   What is the Relevant Clinical Indication / Reason for Exam?     Answer:   FALL, PAIN HX     Order Specific Question:   Release to patient     Answer:   Immediate    Ace wrap     To affected area       Labs performed this visit:  No results found for this or any previous visit (from the past 10 hour(s)).    MDM:  X-ray shows no acute process, exam most reflective of traumatic bursitis compression applied with Ace wrap prior to disposition with supplies provided for home.  Provided orthopedic referral as needed if continued issues over the weeks ahead, agrees over-the-counter Gesic's as needed  alternatively, happy with plan of care    Diagnosis:    ICD-10-CM    1. Elbow strain, right, initial encounter  S46.911A       2. Olecranon bursitis of right elbow  M70.21           All results reviewed and discussed with patient.  See AVS for detailed discharge instructions for your condition today.    Follow Up with:  Jw Spring DO  [address] [facility] [id number] [phone]    Schedule an appointment as soon as possible for a visit in 1 week  As needed    Pj Cloud MD  [facility] [address]  [place] [address] [phone]    Schedule an appointment as soon as possible for a visit in 2 weeks  ORTHOPEDIC IF NOT IMPROVING.

## 2024-04-05 ENCOUNTER — TELEPHONE (OUTPATIENT)
Dept: ORTHOPEDICS CLINIC | Facility: CLINIC | Age: 69
End: 2024-04-05

## 2024-04-05 NOTE — TELEPHONE ENCOUNTER
Patient called for right elbow pain. Please advise if additional xrays needed   Future Appointments   Date Time Provider Department Center   4/24/2024  8:40 AM Leni Montana PA [facility]

## 2024-04-24 ENCOUNTER — OFFICE VISIT (OUTPATIENT)
Dept: ORTHOPEDICS CLINIC | Facility: CLINIC | Age: 69
End: 2024-04-24
Payer: MEDICARE

## 2024-04-24 VITALS — BODY MASS INDEX: 24.79 KG/M2 | HEIGHT: 72 IN | WEIGHT: 183 LBS

## 2024-04-24 DIAGNOSIS — M70.21 OLECRANON BURSITIS OF RIGHT ELBOW: Primary | ICD-10-CM

## 2024-04-24 PROCEDURE — 99203 OFFICE O/P NEW LOW 30 MIN: CPT | Performed by: PHYSICIAN ASSISTANT

## 2024-04-24 RX ORDER — MELOXICAM 15 MG/1
15 TABLET ORAL DAILY
Qty: 30 TABLET | Refills: 0 | Status: SHIPPED | OUTPATIENT
Start: 2024-04-24
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
## 2024-05-20 ENCOUNTER — PATIENT MESSAGE (OUTPATIENT)
Dept: ORTHOPEDICS CLINIC | Facility: CLINIC | Age: 69
End: 2024-05-20

## 2024-05-20 NOTE — TELEPHONE ENCOUNTER
From: Han Rangel  To: Leni Montana  Sent: 5/20/2024 6:54 AM CDT  Subject: follow up to my visit a month ago    Hi  you asked that I let you know how my Bursitis is, It has improved significantly, the fluid appears to be gone, however the elbow is  with some minor soreness, my Meloxicam is running out do suggest a refill?

## 2024-05-21 RX ORDER — CLOPIDOGREL BISULFATE 75 MG/1
TABLET ORAL
Qty: 90 TABLET | Refills: 1 | Status: SHIPPED | OUTPATIENT
Start: 2024-05-21

## 2024-05-22 RX ORDER — MELOXICAM 15 MG/1
TABLET ORAL
Qty: 30 TABLET | Refills: 0 | Status: SHIPPED | OUTPATIENT
Start: 2024-05-22

## 2024-06-21 ENCOUNTER — TELEPHONE (OUTPATIENT)
Dept: ORTHOPEDICS CLINIC | Facility: CLINIC | Age: 69
End: 2024-06-21

## 2024-06-21 DIAGNOSIS — M25.562 LEFT KNEE PAIN, UNSPECIFIED CHRONICITY: Primary | ICD-10-CM

## 2024-06-21 DIAGNOSIS — Z01.89 ENCOUNTER FOR LOWER EXTREMITY COMPARISON IMAGING STUDY: ICD-10-CM

## 2024-06-21 NOTE — TELEPHONE ENCOUNTER
XR ordered per ortho protocol. XR scheduled and patient was notified via [employer] to let them know that they should arrive 15-20 minutes early, in order for them to complete imaging.

## 2024-06-21 NOTE — TELEPHONE ENCOUNTER
Patient is scheduled for left knee pain. Please advise if imaging is needed.  Future Appointments   Date Time Provider Department Center   6/24/2024  8:20 AM Bess Musa MD [facility]

## 2024-06-24 ENCOUNTER — OFFICE VISIT (OUTPATIENT)
Dept: ORTHOPEDICS CLINIC | Facility: CLINIC | Age: 69
End: 2024-06-24

## 2024-06-24 ENCOUNTER — TELEPHONE (OUTPATIENT)
Dept: CARDIOLOGY | Age: 69
End: 2024-06-24

## 2024-06-24 ENCOUNTER — HOSPITAL ENCOUNTER (OUTPATIENT)
Dept: GENERAL RADIOLOGY | Age: 69
Discharge: HOME | End: 2024-06-24
Attending: ORTHOPAEDIC SURGERY

## 2024-06-24 VITALS — BODY MASS INDEX: 24.79 KG/M2 | HEIGHT: 72 IN | WEIGHT: 183 LBS

## 2024-06-24 DIAGNOSIS — Z01.89 ENCOUNTER FOR LOWER EXTREMITY COMPARISON IMAGING STUDY: ICD-10-CM

## 2024-06-24 DIAGNOSIS — M25.562 CHRONIC PAIN OF LEFT KNEE: Primary | ICD-10-CM

## 2024-06-24 DIAGNOSIS — M25.562 LEFT KNEE PAIN, UNSPECIFIED CHRONICITY: ICD-10-CM

## 2024-06-24 DIAGNOSIS — M17.12 PRIMARY OSTEOARTHRITIS OF LEFT KNEE: ICD-10-CM

## 2024-06-24 DIAGNOSIS — G89.29 CHRONIC PAIN OF LEFT KNEE: Primary | ICD-10-CM

## 2024-06-24 PROCEDURE — 73564 X-RAY EXAM KNEE 4 OR MORE: CPT | Performed by: ORTHOPAEDIC SURGERY

## 2024-06-24 PROCEDURE — 73562 X-RAY EXAM OF KNEE 3: CPT | Performed by: ORTHOPAEDIC SURGERY

## 2024-06-24 PROCEDURE — 99203 OFFICE O/P NEW LOW 30 MIN: CPT | Performed by: ORTHOPAEDIC SURGERY

## 2024-06-24 RX ORDER — CLOPIDOGREL BISULFATE 75 MG/1
75 TABLET ORAL DAILY
Qty: 90 TABLET | Refills: 1 | Status: SHIPPED | OUTPATIENT
Start: 2024-06-24

## 2024-06-24 NOTE — PROGRESS NOTES
[facility] New Consult      Chief Complaint   Patient presents with    Knee Pain     LT KNEE PAIN;ONSET: approximately 3 weeks ago  -pain while running and walking  -pain is off and on either while sitting or walking  -cannot sleep       HPI: The patient is a 68 year old male referred for orthopaedic consultation with complaints of chronic left knee pain.  He is recently retired but remains very active with 2-3 mile jog sometimes as often as 2-3x/week.  Over the past month he is experienced increased pain, stiffness more medial than lateral at the left knee with these activities.  He also engages in racquet sports including pickleball and racquetball.  Following care home he spends his days working as a  on his feet for most of the day.  He also plays a few times a week himself.  There has been no report of instability, catching, locking or significant loss of motion.  Posterior fullness makes him wonder if he has a Baker's cyst.    Past Medical History:    Calculus of kidney    High blood pressure    High cholesterol    Hyperlipidemia    Problems with swallowing     Past Surgical History:   Procedure Laterality Date    Colonoscopy N/A 1/10/2024    Procedure: COLONOSCOPY;  Surgeon: Bari Madera MD;  Location: [facility]    Removal of kidney stone       Current Outpatient Medications   Medication Sig Dispense Refill    levoFLOXacin 500 MG Oral Tab Take 1 tablet (500 mg total) by mouth daily. 10 tablet 0    methylPREDNISolone 4 MG Oral Tablet Therapy Pack Take by oral route. 21 tablet 0    pseudoephedrine  MG Oral Tablet 12 Hr Take 1 tablet (120 mg total) by mouth every 12 (twelve) hours. 60 tablet 3    guaiFENesin-codeine 100-10 MG/5ML Oral Solution Take 5 mL by mouth every 6 (six) hours as needed for cough. 180 mL 0    pantoprazole 40 MG Oral Tab EC Take 1 tablet (40 mg total) by mouth 2 (two) times daily before meals. 180 tablet 3    hydroCHLOROthiazide 12.5  MG Oral Tab Take 1 tablet (12.5 mg total) by mouth daily.      montelukast 10 MG Oral Tab Take 1 tablet (10 mg total) by mouth nightly. 30 tablet 3    loratadine 10 MG Oral Tab Take 1 tablet (10 mg total) by mouth daily. 30 tablet 3    valsartan 160 MG Oral Tab Take 1 tablet (160 mg total) by mouth daily.      Clopidogrel Bisulfate 75 MG Oral Tab Take 1 tablet (75 mg total) by mouth daily. 30 tablet 0    ATORVASTATIN CALCIUM 10 MG Oral Tab TAKE ONE TABLET BY MOUTH EVERY DAY 20 tablet 0    Meloxicam 15 MG Oral Tab Take 1 tablet by mouth once daily (as needed only.) (Patient not taking: Reported on 6/24/2024) 30 tablet 0     Allergies   Allergen Reactions    Penicillins OTHER (SEE COMMENTS)     Other reaction(s): Hives/Skin Rash  CLASS, ?questionalble rash     Family History   Problem Relation Age of Onset    Cancer Brother         lung (smoker)    Other (Other) Brother         AAA    Other (Other) Father         AAA     Social History     Occupational History    Not on file   Tobacco Use    Smoking status: Never    Smokeless tobacco: Never   Vaping Use    Vaping status: Never Used   Substance and Sexual Activity    Alcohol use: Yes     Alcohol/week: 3.0 standard drinks of alcohol     Types: 3 Standard drinks or equivalent per week     Comment: socially    Drug use: No    Sexual activity: Not on file        ROS:  Complete ROS reviewed by me and non-contributory to the chief complaint except as mentioned above.    Physical Exam:    Ht 6' (1.829 m)   Wt 183 lb (83 kg)   BMI 24.82 kg/m²   Constitutional: Well developed, well nourished 68 year old male  Psychological: NAD, alert and appropriate  Respiratory: Breathing comfortably on room air with RR of 10-14  Cardiac: Palpable distal pulses with pink warm extremities distally  Left knee: Inspection reveals no significant discoloration nor deformity.  Palpation reveals no palpable effusion.  Range of motion is adequate with extension and adequate flexion to at least 135  degrees.  The medial and posterior lateral joint line is late tender to palpation.  Collaterals are stable on varus valgus stress.  Lachman and posterior drawer are negative.  Popliteal space is nontender without palpable fullness or masses.  No significant distal edema is noted.  Neurovascular status is intact on sensory, motor and perfusion assessment distally.    Imaging:      Multiple views of the left knee with comparison is to the right demonstrates early degenerative changes with trace joint space narrowing at the medial and patellofemoral compartments of both knees, slightly worse on the left than the right.  No acute bony abnormalities are noted.     Assessment/Diagnoses:    Diagnoses and all orders for this visit:    Chronic pain of left knee    Primary osteoarthritis of left knee       Plan:  I reviewed imaging and exam findings with the patient.  The diagnosis is early degenerative joint disease of the left knee, with similar milder changes seen on the right.  We discussed the etiology, natural history and treatment options in detail.  Treatments include activity modification, weight loss, anti-inflammatory use and possible injections.  In the long term, the patient's symptoms may progress and warrant intervention , but only if symptoms become severe.  For now, non-surgical treatment options are recommended.  We discussed the option for knee injections or further imaging if symptom evolve.  Perhaps his greatest change will be minimizing his jogging and racquet sports.  This imparts not only impact stress but pivoting and lunging stress which can accelerate breakdown of articular cartilage over time.  All questions were answered and if symptoms or not improving with initial conservative care reassessment is advised.  If he develops any catching, locking, acute swelling or localized joint line pain, further imaging with an MRI could be considered.  Follow-up will be as needed.    Bess Musa MD, FAAOS   Orthopaedic Surgery   Sports Medicine/Knee and Shoulder  [facility]  t: [phone]  f: [phone]               This document was partially prepared using Dragon Medical voice recognition software.  Although every attempt is made to correct errors during dictation, discrepancies may still exist.

## 2024-07-29 ENCOUNTER — TELEPHONE (OUTPATIENT)
Dept: CARDIOLOGY | Age: 69
End: 2024-07-29

## 2024-07-29 RX ORDER — PANTOPRAZOLE SODIUM 40 MG/1
40 TABLET, DELAYED RELEASE ORAL
Qty: 180 TABLET | Refills: 3 | Status: SHIPPED | OUTPATIENT
Start: 2024-07-29

## 2024-07-29 NOTE — TELEPHONE ENCOUNTER
Requested Prescriptions     Pending Prescriptions Disp Refills    PANTOPRAZOLE 40 MG Oral Tab EC [Pharmacy Med Name: Pantoprazole Sodium Ec 40 Mg Tab Auro] 180 tablet 0     Sig: Take 1 tablet by mouth 2 times daily before meals.        LOV   8/16/2023    LR   12/1/2023

## 2024-07-31 ENCOUNTER — TELEPHONE (OUTPATIENT)
Dept: CARDIOLOGY | Age: 69
End: 2024-07-31

## 2024-08-08 RX ORDER — VALSARTAN 160 MG/1
TABLET ORAL
Qty: 90 TABLET | Refills: 3 | Status: SHIPPED | OUTPATIENT
Start: 2024-08-08

## 2024-09-09 ENCOUNTER — OFFICE VISIT (OUTPATIENT)
Dept: ORTHOPEDICS CLINIC | Facility: CLINIC | Age: 69
End: 2024-09-09
Payer: MEDICARE

## 2024-09-09 VITALS — HEIGHT: 72 IN | WEIGHT: 183 LBS | BODY MASS INDEX: 24.79 KG/M2

## 2024-09-09 DIAGNOSIS — M17.12 PRIMARY OSTEOARTHRITIS OF LEFT KNEE: ICD-10-CM

## 2024-09-09 DIAGNOSIS — M70.21 OLECRANON BURSITIS OF RIGHT ELBOW: ICD-10-CM

## 2024-09-09 DIAGNOSIS — G89.29 CHRONIC PAIN OF LEFT KNEE: Primary | ICD-10-CM

## 2024-09-09 DIAGNOSIS — M25.562 CHRONIC PAIN OF LEFT KNEE: Primary | ICD-10-CM

## 2024-09-09 PROCEDURE — 99213 OFFICE O/P EST LOW 20 MIN: CPT | Performed by: ORTHOPAEDIC SURGERY

## 2024-09-09 NOTE — H&P (VIEW-ONLY)
[facility] Orthopaedic Follow-up note    Chief Complaint   Patient presents with    Knee Pain     LEFT KNEE PAIN   - PAIN COMES AND GOES        HPI: The patient is a 68 year old male returning for orthopaedic consultation with complaints of chronic left knee pain.  Since his last visit he has discontinued jogging and refrain from playing pickle ball.  Fortunately he is able to golf without symptoms.  Responsibilities as a local  are also well-tolerated without new symptoms.  Since making this follow-up appointment he relates that the symptoms have substantially improved.  He is also recovering from mild symptoms of olecranon bursitis with only mild residual right elbow stiffness.    Past Medical History:    Calculus of kidney    High blood pressure    High cholesterol    Hyperlipidemia    Problems with swallowing     Past Surgical History:   Procedure Laterality Date    Colonoscopy N/A 1/10/2024    Procedure: COLONOSCOPY;  Surgeon: Bari Madera MD;  Location: [facility]    Removal of kidney stone       Current Outpatient Medications   Medication Sig Dispense Refill    pantoprazole 40 MG Oral Tab EC Take 1 tablet (40 mg total) by mouth 2 (two) times daily before meals. 180 tablet 3    levoFLOXacin 500 MG Oral Tab Take 1 tablet (500 mg total) by mouth daily. 10 tablet 0    methylPREDNISolone 4 MG Oral Tablet Therapy Pack Take by oral route. 21 tablet 0    pseudoephedrine  MG Oral Tablet 12 Hr Take 1 tablet (120 mg total) by mouth every 12 (twelve) hours. 60 tablet 3    guaiFENesin-codeine 100-10 MG/5ML Oral Solution Take 5 mL by mouth every 6 (six) hours as needed for cough. 180 mL 0    hydroCHLOROthiazide 12.5 MG Oral Tab Take 1 tablet (12.5 mg total) by mouth daily.      montelukast 10 MG Oral Tab Take 1 tablet (10 mg total) by mouth nightly. 30 tablet 3    loratadine 10 MG Oral Tab Take 1 tablet (10 mg total) by mouth daily. 30 tablet 3    valsartan 160 MG Oral Tab Take 1  tablet (160 mg total) by mouth daily.      Clopidogrel Bisulfate 75 MG Oral Tab Take 1 tablet (75 mg total) by mouth daily. 30 tablet 0    ATORVASTATIN CALCIUM 10 MG Oral Tab TAKE ONE TABLET BY MOUTH EVERY DAY 20 tablet 0    Meloxicam 15 MG Oral Tab Take 1 tablet by mouth once daily (as needed only.) (Patient not taking: Reported on 9/9/2024) 30 tablet 0     Allergies   Allergen Reactions    Penicillins OTHER (SEE COMMENTS)     Other reaction(s): Hives/Skin Rash  CLASS, ?questionalble rash     Family History   Problem Relation Age of Onset    Cancer Brother         lung (smoker)    Other (Other) Brother         AAA    Other (Other) Father         AAA     Social History     Occupational History    Not on file   Tobacco Use    Smoking status: Never    Smokeless tobacco: Never   Vaping Use    Vaping status: Never Used   Substance and Sexual Activity    Alcohol use: Yes     Alcohol/week: 3.0 standard drinks of alcohol     Types: 3 Standard drinks or equivalent per week     Comment: socially    Drug use: No    Sexual activity: Not on file        ROS:  Complete ROS reviewed by me and non-contributory to the chief complaint except as mentioned above.    Physical Exam:    Ht 6' (1.829 m)   Wt 183 lb (83 kg)   BMI 24.82 kg/m²   Left knee: Inspection reveals no significant discoloration nor deformity.  Palpation reveals no palpable effusion.  Range of motion is adequate with extension and adequate flexion to at least 135 degrees.  The medial and posterior lateral joint line is late tender to palpation.  Collaterals are stable on varus valgus stress.  Lachman and posterior drawer are negative.  Popliteal space is nontender without palpable fullness or masses.  No significant distal edema is noted.  Neurovascular status is intact on sensory, motor and perfusion assessment distally.    Right elbow appears benign without palpable fluid at the olecranon bursa.  Full symmetrical range of motion is noted with no weakness on elbow  extension to resistance.  Distal exam remains intact.    Imaging:      Multiple views of the left knee from 6/24/2024 with comparison is to the right demonstrates early degenerative changes with trace joint space narrowing at the medial and patellofemoral compartments of both knees, slightly worse on the left than the right.  No acute bony abnormalities are noted.    Assessment/Diagnoses:    Diagnoses and all orders for this visit:    Chronic pain of left knee    Primary osteoarthritis of left knee    Olecranon bursitis of right elbow      Plan:  I reviewed imaging and exam findings with the patient.  The diagnosis is early degenerative joint disease of the left knee, with similar milder changes seen on the right.  We discussed the etiology, natural history and treatment options in detail.  Treatments include activity modification, anti-inflammatory use and possible injections.  Perhaps the greatest and most beneficial intervention would be activity modification.  He has refrain from running and had taken a break from pickleball both of which are likely exacerbating his symptoms.  In the long term, the patient's symptoms may progress and warrant intervention , but only if symptoms become severe.  For now, non-surgical treatment options are recommended.  Given that his symptoms have responded to conservative care.  There are no indications to consider injections today.  All questions were answered and if symptoms or not improving with initial conservative care reassessment is advised.  If he develops any catching, locking, acute swelling or localized joint line pain, further imaging with an MRI could be considered.      With regard to his elbow, he seems to have resolved the swelling from olecranon bursitis.  This also will respond to preventative care with avoiding any direct pressure to the posterior aspect of the right elbow.  Perhaps his racquet sport activities also contributed.  All questions were answered and  he verbalized understanding and appreciation.  Follow-up will be as needed.    Bess Musa MD, FAAOS  Orthopaedic Surgery   Sports Medicine/Knee and Shoulder  [facility]  t: [phone]  f: [phone]        This document was partially prepared using Dragon Medical voice recognition software.  Although every attempt is made to correct errors during dictation, discrepancies may still exist.

## 2024-09-09 NOTE — PROGRESS NOTES
[facility] Orthopaedic Follow-up note    Chief Complaint   Patient presents with    Knee Pain     LEFT KNEE PAIN   - PAIN COMES AND GOES        HPI: The patient is a 68 year old male returning for orthopaedic consultation with complaints of chronic left knee pain.  Since his last visit he has discontinued jogging and refrain from playing pickle ball.  Fortunately he is able to golf without symptoms.  Responsibilities as a local  are also well-tolerated without new symptoms.  Since making this follow-up appointment he relates that the symptoms have substantially improved.  He is also recovering from mild symptoms of olecranon bursitis with only mild residual right elbow stiffness.    Past Medical History:    Calculus of kidney    High blood pressure    High cholesterol    Hyperlipidemia    Problems with swallowing     Past Surgical History:   Procedure Laterality Date    Colonoscopy N/A 1/10/2024    Procedure: COLONOSCOPY;  Surgeon: Bari Madera MD;  Location: [facility]    Removal of kidney stone       Current Outpatient Medications   Medication Sig Dispense Refill    pantoprazole 40 MG Oral Tab EC Take 1 tablet (40 mg total) by mouth 2 (two) times daily before meals. 180 tablet 3    levoFLOXacin 500 MG Oral Tab Take 1 tablet (500 mg total) by mouth daily. 10 tablet 0    methylPREDNISolone 4 MG Oral Tablet Therapy Pack Take by oral route. 21 tablet 0    pseudoephedrine  MG Oral Tablet 12 Hr Take 1 tablet (120 mg total) by mouth every 12 (twelve) hours. 60 tablet 3    guaiFENesin-codeine 100-10 MG/5ML Oral Solution Take 5 mL by mouth every 6 (six) hours as needed for cough. 180 mL 0    hydroCHLOROthiazide 12.5 MG Oral Tab Take 1 tablet (12.5 mg total) by mouth daily.      montelukast 10 MG Oral Tab Take 1 tablet (10 mg total) by mouth nightly. 30 tablet 3    loratadine 10 MG Oral Tab Take 1 tablet (10 mg total) by mouth daily. 30 tablet 3    valsartan 160 MG Oral Tab Take 1  tablet (160 mg total) by mouth daily.      Clopidogrel Bisulfate 75 MG Oral Tab Take 1 tablet (75 mg total) by mouth daily. 30 tablet 0    ATORVASTATIN CALCIUM 10 MG Oral Tab TAKE ONE TABLET BY MOUTH EVERY DAY 20 tablet 0    Meloxicam 15 MG Oral Tab Take 1 tablet by mouth once daily (as needed only.) (Patient not taking: Reported on 9/9/2024) 30 tablet 0     Allergies   Allergen Reactions    Penicillins OTHER (SEE COMMENTS)     Other reaction(s): Hives/Skin Rash  CLASS, ?questionalble rash     Family History   Problem Relation Age of Onset    Cancer Brother         lung (smoker)    Other (Other) Brother         AAA    Other (Other) Father         AAA     Social History     Occupational History    Not on file   Tobacco Use    Smoking status: Never    Smokeless tobacco: Never   Vaping Use    Vaping status: Never Used   Substance and Sexual Activity    Alcohol use: Yes     Alcohol/week: 3.0 standard drinks of alcohol     Types: 3 Standard drinks or equivalent per week     Comment: socially    Drug use: No    Sexual activity: Not on file        ROS:  Complete ROS reviewed by me and non-contributory to the chief complaint except as mentioned above.    Physical Exam:    Ht 6' (1.829 m)   Wt 183 lb (83 kg)   BMI 24.82 kg/m²   Left knee: Inspection reveals no significant discoloration nor deformity.  Palpation reveals no palpable effusion.  Range of motion is adequate with extension and adequate flexion to at least 135 degrees.  The medial and posterior lateral joint line is late tender to palpation.  Collaterals are stable on varus valgus stress.  Lachman and posterior drawer are negative.  Popliteal space is nontender without palpable fullness or masses.  No significant distal edema is noted.  Neurovascular status is intact on sensory, motor and perfusion assessment distally.    Right elbow appears benign without palpable fluid at the olecranon bursa.  Full symmetrical range of motion is noted with no weakness on elbow  extension to resistance.  Distal exam remains intact.    Imaging:      Multiple views of the left knee from 6/24/2024 with comparison is to the right demonstrates early degenerative changes with trace joint space narrowing at the medial and patellofemoral compartments of both knees, slightly worse on the left than the right.  No acute bony abnormalities are noted.    Assessment/Diagnoses:    Diagnoses and all orders for this visit:    Chronic pain of left knee    Primary osteoarthritis of left knee    Olecranon bursitis of right elbow      Plan:  I reviewed imaging and exam findings with the patient.  The diagnosis is early degenerative joint disease of the left knee, with similar milder changes seen on the right.  We discussed the etiology, natural history and treatment options in detail.  Treatments include activity modification, anti-inflammatory use and possible injections.  Perhaps the greatest and most beneficial intervention would be activity modification.  He has refrain from running and had taken a break from pickleball both of which are likely exacerbating his symptoms.  In the long term, the patient's symptoms may progress and warrant intervention , but only if symptoms become severe.  For now, non-surgical treatment options are recommended.  Given that his symptoms have responded to conservative care.  There are no indications to consider injections today.  All questions were answered and if symptoms or not improving with initial conservative care reassessment is advised.  If he develops any catching, locking, acute swelling or localized joint line pain, further imaging with an MRI could be considered.      With regard to his elbow, he seems to have resolved the swelling from olecranon bursitis.  This also will respond to preventative care with avoiding any direct pressure to the posterior aspect of the right elbow.  Perhaps his racquet sport activities also contributed.  All questions were answered and  he verbalized understanding and appreciation.  Follow-up will be as needed.    Bess Musa MD, FAAOS  Orthopaedic Surgery   Sports Medicine/Knee and Shoulder  [facility]  t: [phone]  f: [phone]        This document was partially prepared using Dragon Medical voice recognition software.  Although every attempt is made to correct errors during dictation, discrepancies may still exist.

## 2024-09-16 ENCOUNTER — TELEPHONE (OUTPATIENT)
Dept: FAMILY MEDICINE CLINIC | Facility: CLINIC | Age: 69
End: 2024-09-16

## 2024-09-16 ENCOUNTER — OFFICE VISIT (OUTPATIENT)
Dept: FAMILY MEDICINE CLINIC | Facility: CLINIC | Age: 69
End: 2024-09-16

## 2024-09-16 VITALS
WEIGHT: 191 LBS | HEIGHT: 72 IN | SYSTOLIC BLOOD PRESSURE: 129 MMHG | HEART RATE: 69 BPM | BODY MASS INDEX: 25.87 KG/M2 | OXYGEN SATURATION: 98 % | DIASTOLIC BLOOD PRESSURE: 80 MMHG

## 2024-09-16 DIAGNOSIS — H33.22 LEFT RETINAL DETACHMENT: Primary | ICD-10-CM

## 2024-09-16 DIAGNOSIS — Z01.818 PRE-OP EXAMINATION: ICD-10-CM

## 2024-09-16 RX ORDER — ATORVASTATIN CALCIUM 10 MG/1
10 TABLET, FILM COATED ORAL NIGHTLY
COMMUNITY

## 2024-09-16 NOTE — TELEPHONE ENCOUNTER
Patient is requesting a pre-op clearance appointment with provider for today, 9/16/24.    Patient mentions he is scheduled for surgery on 9/19/24.    PCPs first available appointment is on 10/8/24.      Is provider able to see patient on 9/16/24   Please advise.

## 2024-09-16 NOTE — PROGRESS NOTES
Subjective:   Han Rangel is a 68 year old male who presents for Pre-Op (Patient is scheduled for vitrectomy(left eye) surgery with Dr. Hopper on 9/19/24. )       History/Other:    Chief Complaint Reviewed and Verified  Nursing Notes Reviewed and   Verified  Allergies Reviewed  Problem List Reviewed         Tobacco:  He has never smoked tobacco.    Current Outpatient Medications   Medication Sig Dispense Refill    atorvastatin 10 MG Oral Tab Take 1 tablet (10 mg total) by mouth nightly.      pantoprazole 40 MG Oral Tab EC Take 1 tablet (40 mg total) by mouth 2 (two) times daily before meals. 180 tablet 3    hydroCHLOROthiazide 12.5 MG Oral Tab Take 1 tablet (12.5 mg total) by mouth daily.      valsartan 160 MG Oral Tab Take 1 tablet (160 mg total) by mouth daily.      Clopidogrel Bisulfate 75 MG Oral Tab Take 1 tablet (75 mg total) by mouth daily. 30 tablet 0         Review of Systems:  Review of Systems   Constitutional: Negative.    Eyes:  Positive for visual disturbance.   Respiratory: Negative.     Cardiovascular: Negative.    Neurological: Negative.          Objective:   /80   Pulse 69   Ht 6' (1.829 m)   Wt 191 lb (86.6 kg)   SpO2 98%   BMI 25.90 kg/m²  Estimated body mass index is 25.9 kg/m² as calculated from the following:    Height as of this encounter: 6' (1.829 m).    Weight as of this encounter: 191 lb (86.6 kg).  Physical Exam  Vitals reviewed.   Cardiovascular:      Rate and Rhythm: Normal rate and regular rhythm.      Pulses: Normal pulses.      Heart sounds: Normal heart sounds.   Pulmonary:      Effort: Pulmonary effort is normal.      Breath sounds: Normal breath sounds.   Musculoskeletal:      Right lower leg: No edema.      Left lower leg: No edema.   Neurological:      General: No focal deficit present.           Assessment & Plan:   1. Left retinal detachment (Primary)  2. Pre-op examination    Lab and ECG and will review and clear for surgery.  Low risk for  complication.  He can stop his clopidogrel a week prior to surgery.       No follow-ups on file.    Jw Spring DO, 9/16/2024, 5:50 PM

## 2024-09-16 NOTE — TELEPHONE ENCOUNTER
Patient is scheduled for surgery on 9/19/24    Dr. Damien Dempsey  Ophthalmology  [facility]    FAX # [phone]    Vitrectomy surgery    Please advise.

## 2024-09-16 NOTE — TELEPHONE ENCOUNTER
Patient is scheduled for pre-op appointment for 9/17/24 at 10:30am with nurse practitioner Amparo.Patient will bring documents with requirements.

## 2024-09-16 NOTE — TELEPHONE ENCOUNTER
Please disregard message below. Patient is scheduled for today 9/16/24 with Dr. Spring for pre-op appt. Patient confirmed understanding.

## 2024-09-17 ENCOUNTER — TELEPHONE (OUTPATIENT)
Dept: FAMILY MEDICINE CLINIC | Facility: CLINIC | Age: 69
End: 2024-09-17

## 2024-09-17 ENCOUNTER — LAB ENCOUNTER (OUTPATIENT)
Dept: LAB | Age: 69
End: 2024-09-17
Attending: FAMILY MEDICINE
Payer: MEDICARE

## 2024-09-17 ENCOUNTER — EKG ENCOUNTER (OUTPATIENT)
Dept: LAB | Age: 69
End: 2024-09-17
Attending: FAMILY MEDICINE
Payer: MEDICARE

## 2024-09-17 DIAGNOSIS — Z01.818 PRE-OP EXAMINATION: ICD-10-CM

## 2024-09-17 DIAGNOSIS — H33.22 LEFT RETINAL DETACHMENT: ICD-10-CM

## 2024-09-17 LAB
ATRIAL RATE: 71 BPM
BASOPHILS # BLD AUTO: 0.03 X10(3) UL (ref 0–0.2)
BASOPHILS NFR BLD AUTO: 0.6 %
DEPRECATED RDW RBC AUTO: 42 FL (ref 35.1–46.3)
EOSINOPHIL # BLD AUTO: 0.11 X10(3) UL (ref 0–0.7)
EOSINOPHIL NFR BLD AUTO: 2.2 %
ERYTHROCYTE [DISTWIDTH] IN BLOOD BY AUTOMATED COUNT: 12.4 % (ref 11–15)
HCT VFR BLD AUTO: 40.6 %
HGB BLD-MCNC: 14.3 G/DL
IMM GRANULOCYTES # BLD AUTO: 0.01 X10(3) UL (ref 0–1)
IMM GRANULOCYTES NFR BLD: 0.2 %
LYMPHOCYTES # BLD AUTO: 2.15 X10(3) UL (ref 1–4)
LYMPHOCYTES NFR BLD AUTO: 43.1 %
MCH RBC QN AUTO: 32.3 PG (ref 26–34)
MCHC RBC AUTO-ENTMCNC: 35.2 G/DL (ref 31–37)
MCV RBC AUTO: 91.6 FL
MONOCYTES # BLD AUTO: 0.32 X10(3) UL (ref 0.1–1)
MONOCYTES NFR BLD AUTO: 6.4 %
NEUTROPHILS # BLD AUTO: 2.37 X10 (3) UL (ref 1.5–7.7)
NEUTROPHILS # BLD AUTO: 2.37 X10(3) UL (ref 1.5–7.7)
NEUTROPHILS NFR BLD AUTO: 47.5 %
P AXIS: 67 DEGREES
P-R INTERVAL: 158 MS
PLATELET # BLD AUTO: 194 10(3)UL (ref 150–450)
Q-T INTERVAL: 418 MS
QRS DURATION: 78 MS
QTC CALCULATION (BEZET): 454 MS
R AXIS: 57 DEGREES
RBC # BLD AUTO: 4.43 X10(6)UL
T AXIS: -1 DEGREES
VENTRICULAR RATE: 71 BPM
WBC # BLD AUTO: 5 X10(3) UL (ref 4–11)

## 2024-09-17 PROCEDURE — 93005 ELECTROCARDIOGRAM TRACING: CPT

## 2024-09-17 PROCEDURE — 93010 ELECTROCARDIOGRAM REPORT: CPT | Performed by: INTERNAL MEDICINE

## 2024-09-17 PROCEDURE — 85025 COMPLETE CBC W/AUTO DIFF WBC: CPT

## 2024-09-17 PROCEDURE — 36415 COLL VENOUS BLD VENIPUNCTURE: CPT

## 2024-09-17 NOTE — TELEPHONE ENCOUNTER
Per [place] please fax the medical clearance, labs, EKG with tracing to [phone] due to patient surgery is on 09/19/2024.  Please advise,

## 2024-09-18 NOTE — DISCHARGE INSTRUCTIONS
Tylenol for pain as needed  Keep dressing clean and dry  Keep eye shield on at all times  Stay prone   Bring eye drops and tape to appointment        HOME INSTRUCTIONS  [facility] HOME CARE INSTRUCTIONS: POST-OP ANESTHESIA  The medication that you received for sedation or general anesthesia can last up to 24 hours. Your judgment and reflexes may be altered, even if you feel like your normal self.      We Recommend:   Do not drive any motor vehicle or bicycle   Avoid mowing the lawn, playing sports, or working with power tools/applicances (power saws, electric knives or mixers)   That you have someone stay with you on your first night home   Do not drink alcohol or take sleeping pills or tranquilizers   Do not sign legal documents within 24 hours of your procedure   If you had a nerve block for your surgery, take extra care not to put any pressure on your arm or hand for 24 hours    It is normal:  For you to have a sore throat if you had a breathing tube during surgery (while you were asleep!). The sore throat should get better within 48 hours. You can gargle with warm salt water (1/2 tsp in 4 oz warm water) or use a throat lozenge for comfort  To feel muscle aches or soreness especially in the abdomen, chest or neck. The achy feeling should go away in the next 24 hours  To feel weak, sleepy or \"wiped out\". Your should start feeling better in the next 24 hours.   To experience mild discomforts such as sore lip or tongue, headache, cramps, gas pains or a bloated feeling in your abdomen.   To experience mild back pain or soreness for a day or two if you had spinal or epidural anesthesia.   If you had laparoscopic surgery, to feel shoulder pain or discomfort on the day of surgery.   For some patients to have nausea after surgery/anesthesia    If you feel nausea or experience vomiting:   Try to move around less.   Eat less than usual or drink only liquids until the next morning   Nausea should resolve in about 24  hours    If you have a problem when you are at home:    Call your surgeons office   Discharge Instructions: After Your Surgery  You’ve just had surgery. During surgery, you were given medicine called anesthesia to keep you relaxed and free of pain. After surgery, you may have some pain or nausea. This is common. Here are some tips for feeling better and getting well after surgery.   Going home  Your healthcare provider will show you how to take care of yourself when you go home. They'll also answer your questions. Have an adult family member or friend drive you home. For the first 24 hours after your surgery:   Don't drive or use heavy equipment.  Don't make important decisions or sign legal papers.  Take medicines as directed.  Don't drink alcohol.  Have someone stay with you, if needed. They can watch for problems and help keep you safe.  Be sure to go to all follow-up visits with your healthcare provider. And rest after your surgery for as long as your provider tells you to.   Coping with pain  If you have pain after surgery, pain medicine will help you feel better. Take it as directed, before pain becomes severe. Also, ask your healthcare provider or pharmacist about other ways to control pain. This might be with heat, ice, or relaxation. And follow any other instructions your surgeon or nurse gives you.      Stay on schedule with your medicine.     Tips for taking pain medicine  To get the best relief possible, remember these points:   Pain medicines can upset your stomach. Taking them with a little food may help.  Most pain relievers taken by mouth need at least 20 to 30 minutes to start to work.  Don't wait till your pain becomes severe before you take your medicine. Try to time your medicine so that you can take it before starting an activity. This might be before you get dressed, go for a walk, or sit down for dinner.  Constipation is a common side effect of some pain medicines. Call your healthcare  provider before taking any medicines such as laxatives or stool softeners to help ease constipation. Also ask if you should skip any foods. Drinking lots of fluids and eating foods such as fruits and vegetables that are high in fiber can also help. Remember, don't take laxatives unless your surgeon has prescribed them.  Drinking alcohol and taking pain medicine can cause dizziness and slow your breathing. It can even be deadly. Don't drink alcohol while taking pain medicine.  Pain medicine can make you react more slowly to things. Don't drive or run machinery while taking pain medicine.  Your healthcare provider may tell you to take acetaminophen to help ease your pain. Ask them how much you're supposed to take each day. Acetaminophen or other pain relievers may interact with your prescription medicines or other over-the-counter (OTC) medicines. Some prescription medicines have acetaminophen and other ingredients in them. Using both prescription and OTC acetaminophen for pain can cause you to accidentally overdose. Read the labels on your OTC medicines with care. This will help you to clearly know the list of ingredients, how much to take, and any warnings. It may also help you not take too much acetaminophen. If you have questions or don't understand the information, ask your pharmacist or healthcare provider to explain it to you before you take the OTC medicine.   Managing nausea  Some people have an upset stomach (nausea) after surgery. This is often because of anesthesia, pain, or pain medicine, less movement of food in the stomach, or the stress of surgery. These tips will help you handle nausea and eat healthy foods as you get better. If you were on a special food plan before surgery, ask your healthcare provider if you should follow it while you get better. Check with your provider on how your eating should progress. It may depend on the surgery you had. These general tips may help:   Don't push yourself to  eat. Your body will tell you when to eat and how much.  Start off with clear liquids and soup. They're easier to digest.  Next try semi-solid foods as you feel ready. These include mashed potatoes, applesauce, and gelatin.  Slowly move to solid foods. Don’t eat fatty, rich, or spicy foods at first.  Don't force yourself to have 3 large meals a day. Instead eat smaller amounts more often.  Take pain medicines with a small amount of solid food, such as crackers or toast. This helps prevent nausea.  When to call your healthcare provider  Call your healthcare provider right away if you have any of these:   You still have too much pain, or the pain gets worse, after taking the medicine. The medicine may not be strong enough. Or there may be a complication from the surgery.  You feel too sleepy, dizzy, or groggy. The medicine may be too strong.  Side effects such as nausea or vomiting. Your healthcare provider may advise taking other medicines to .  Skin changes such as rash, itching, or hives. This may mean you have an allergic reaction. Your provider may advise taking other medicines.  The incision looks different (for instance, part of it opens up).  Bleeding or fluid leaking from the incision site, and weren't told to expect that.  Fever of 100.4°F (38°C) or higher, or as directed by your provider.  Call 911  Call 911 right away if you have:   Trouble breathing  Facial swelling    If you have obstructive sleep apnea   You were given anesthesia medicine during surgery to keep you comfortable and free of pain. After surgery, you may have more apnea spells because of this medicine and other medicines you were given. The spells may last longer than normal.    At home:  Keep using the continuous positive airway pressure (CPAP) device when you sleep. Unless your healthcare provider tells you not to, use it when you sleep, day or night. CPAP is a common device used to treat obstructive sleep apnea.  Talk with your provider  before taking any pain medicine, muscle relaxants, or sedatives. Your provider will tell you about the possible dangers of taking these medicines.  Contact your provider if your sleeping changes a lot even when taking medicines as directed.  [place] last reviewed this educational content on 10/1/2021  © 2890-9114 [employer]. All rights reserved. This information is not intended as a substitute for professional medical care. Always follow your healthcare professional's instructions.

## 2024-09-19 ENCOUNTER — ANESTHESIA (OUTPATIENT)
Dept: SURGERY | Facility: HOSPITAL | Age: 69
End: 2024-09-19
Payer: MEDICARE

## 2024-09-19 ENCOUNTER — HOSPITAL ENCOUNTER (OUTPATIENT)
Facility: HOSPITAL | Age: 69
Setting detail: HOSPITAL OUTPATIENT SURGERY
Discharge: HOME | End: 2024-09-19
Attending: OPHTHALMOLOGY | Admitting: OPHTHALMOLOGY
Payer: MEDICARE

## 2024-09-19 ENCOUNTER — ANESTHESIA EVENT (OUTPATIENT)
Dept: SURGERY | Facility: HOSPITAL | Age: 69
End: 2024-09-19
Payer: MEDICARE

## 2024-09-19 VITALS
SYSTOLIC BLOOD PRESSURE: 154 MMHG | OXYGEN SATURATION: 94 % | HEART RATE: 73 BPM | TEMPERATURE: 98 F | HEIGHT: 72 IN | WEIGHT: 192 LBS | DIASTOLIC BLOOD PRESSURE: 93 MMHG | BODY MASS INDEX: 26.01 KG/M2 | RESPIRATION RATE: 16 BRPM

## 2024-09-19 PROCEDURE — 08T53ZZ RESECTION OF LEFT VITREOUS, PERCUTANEOUS APPROACH: ICD-10-PCS | Performed by: OPHTHALMOLOGY

## 2024-09-19 PROCEDURE — 085F3ZZ DESTRUCTION OF LEFT RETINA, PERCUTANEOUS APPROACH: ICD-10-PCS | Performed by: OPHTHALMOLOGY

## 2024-09-19 RX ORDER — ROCURONIUM BROMIDE 10 MG/ML
INJECTION, SOLUTION INTRAVENOUS AS NEEDED
Status: DISCONTINUED | OUTPATIENT
Start: 2024-09-19 | End: 2024-09-19 | Stop reason: SURG

## 2024-09-19 RX ORDER — PHENYLEPHRINE HYDROCHLORIDE 25 MG/ML
2 SOLUTION/ DROPS OPHTHALMIC
Status: COMPLETED | OUTPATIENT
Start: 2024-09-19 | End: 2024-09-19

## 2024-09-19 RX ORDER — METOCLOPRAMIDE HYDROCHLORIDE 5 MG/ML
10 INJECTION INTRAMUSCULAR; INTRAVENOUS EVERY 8 HOURS PRN
Status: DISCONTINUED | OUTPATIENT
Start: 2024-09-19 | End: 2024-09-19

## 2024-09-19 RX ORDER — MORPHINE SULFATE 4 MG/ML
4 INJECTION, SOLUTION INTRAMUSCULAR; INTRAVENOUS EVERY 10 MIN PRN
Status: DISCONTINUED | OUTPATIENT
Start: 2024-09-19 | End: 2024-09-19

## 2024-09-19 RX ORDER — ACETAMINOPHEN 500 MG
1000 TABLET ORAL ONCE
Status: COMPLETED | OUTPATIENT
Start: 2024-09-19 | End: 2024-09-19

## 2024-09-19 RX ORDER — LIDOCAINE HYDROCHLORIDE 10 MG/ML
INJECTION, SOLUTION EPIDURAL; INFILTRATION; INTRACAUDAL; PERINEURAL AS NEEDED
Status: DISCONTINUED | OUTPATIENT
Start: 2024-09-19 | End: 2024-09-19 | Stop reason: SURG

## 2024-09-19 RX ORDER — DEXAMETHASONE SODIUM PHOSPHATE 4 MG/ML
VIAL (ML) INJECTION AS NEEDED
Status: DISCONTINUED | OUTPATIENT
Start: 2024-09-19 | End: 2024-09-19 | Stop reason: SURG

## 2024-09-19 RX ORDER — BALANCED SALT SOLUTION 6.4; .75; .48; .3; 3.9; 1.7 MG/ML; MG/ML; MG/ML; MG/ML; MG/ML; MG/ML
SOLUTION OPHTHALMIC AS NEEDED
Status: DISCONTINUED | OUTPATIENT
Start: 2024-09-19 | End: 2024-09-19 | Stop reason: HOSPADM

## 2024-09-19 RX ORDER — MORPHINE SULFATE 4 MG/ML
2 INJECTION, SOLUTION INTRAMUSCULAR; INTRAVENOUS EVERY 10 MIN PRN
Status: DISCONTINUED | OUTPATIENT
Start: 2024-09-19 | End: 2024-09-19

## 2024-09-19 RX ORDER — ATROPINE SULFATE 10 MG/ML
1 SOLUTION/ DROPS OPHTHALMIC
Status: COMPLETED | OUTPATIENT
Start: 2024-09-19 | End: 2024-09-19

## 2024-09-19 RX ORDER — HYDRALAZINE HYDROCHLORIDE 20 MG/ML
5 INJECTION INTRAMUSCULAR; INTRAVENOUS EVERY 10 MIN PRN
OUTPATIENT
Start: 2024-09-19

## 2024-09-19 RX ORDER — HYDROMORPHONE HYDROCHLORIDE 1 MG/ML
0.4 INJECTION, SOLUTION INTRAMUSCULAR; INTRAVENOUS; SUBCUTANEOUS EVERY 5 MIN PRN
Status: DISCONTINUED | OUTPATIENT
Start: 2024-09-19 | End: 2024-09-19

## 2024-09-19 RX ORDER — HYDROMORPHONE HYDROCHLORIDE 1 MG/ML
0.6 INJECTION, SOLUTION INTRAMUSCULAR; INTRAVENOUS; SUBCUTANEOUS EVERY 5 MIN PRN
Status: DISCONTINUED | OUTPATIENT
Start: 2024-09-19 | End: 2024-09-19

## 2024-09-19 RX ORDER — ONDANSETRON 2 MG/ML
4 INJECTION INTRAMUSCULAR; INTRAVENOUS EVERY 6 HOURS PRN
Status: DISCONTINUED | OUTPATIENT
Start: 2024-09-19 | End: 2024-09-19

## 2024-09-19 RX ORDER — ONDANSETRON 2 MG/ML
INJECTION INTRAMUSCULAR; INTRAVENOUS AS NEEDED
Status: DISCONTINUED | OUTPATIENT
Start: 2024-09-19 | End: 2024-09-19 | Stop reason: SURG

## 2024-09-19 RX ORDER — SODIUM CHLORIDE, SODIUM LACTATE, POTASSIUM CHLORIDE, CALCIUM CHLORIDE 600; 310; 30; 20 MG/100ML; MG/100ML; MG/100ML; MG/100ML
INJECTION, SOLUTION INTRAVENOUS CONTINUOUS
Status: DISCONTINUED | OUTPATIENT
Start: 2024-09-19 | End: 2024-09-19

## 2024-09-19 RX ORDER — PHENYLEPHRINE HCL 10 MG/ML
VIAL (ML) INJECTION AS NEEDED
Status: DISCONTINUED | OUTPATIENT
Start: 2024-09-19 | End: 2024-09-19 | Stop reason: SURG

## 2024-09-19 RX ORDER — ATROPINE SULFATE 10 MG/ML
SOLUTION/ DROPS OPHTHALMIC AS NEEDED
Status: DISCONTINUED | OUTPATIENT
Start: 2024-09-19 | End: 2024-09-19 | Stop reason: HOSPADM

## 2024-09-19 RX ORDER — MORPHINE SULFATE 10 MG/ML
6 INJECTION, SOLUTION INTRAMUSCULAR; INTRAVENOUS EVERY 10 MIN PRN
Status: DISCONTINUED | OUTPATIENT
Start: 2024-09-19 | End: 2024-09-19

## 2024-09-19 RX ORDER — GENTAMICIN SULFATE 3 MG/ML
2 SOLUTION/ DROPS OPHTHALMIC
Status: COMPLETED | OUTPATIENT
Start: 2024-09-19 | End: 2024-09-19

## 2024-09-19 RX ORDER — LABETALOL HYDROCHLORIDE 5 MG/ML
5 INJECTION, SOLUTION INTRAVENOUS EVERY 5 MIN PRN
Status: DISCONTINUED | OUTPATIENT
Start: 2024-09-19 | End: 2024-09-19

## 2024-09-19 RX ORDER — HYDROMORPHONE HYDROCHLORIDE 1 MG/ML
0.2 INJECTION, SOLUTION INTRAMUSCULAR; INTRAVENOUS; SUBCUTANEOUS EVERY 5 MIN PRN
Status: DISCONTINUED | OUTPATIENT
Start: 2024-09-19 | End: 2024-09-19

## 2024-09-19 RX ORDER — NALOXONE HYDROCHLORIDE 0.4 MG/ML
0.08 INJECTION, SOLUTION INTRAMUSCULAR; INTRAVENOUS; SUBCUTANEOUS AS NEEDED
Status: DISCONTINUED | OUTPATIENT
Start: 2024-09-19 | End: 2024-09-19

## 2024-09-19 RX ADMIN — PHENYLEPHRINE HCL 100 MCG: 10 MG/ML VIAL (ML) INJECTION at 10:45:00

## 2024-09-19 RX ADMIN — LIDOCAINE HYDROCHLORIDE 40 MG: 10 INJECTION, SOLUTION EPIDURAL; INFILTRATION; INTRACAUDAL; PERINEURAL at 10:21:00

## 2024-09-19 RX ADMIN — ONDANSETRON 4 MG: 2 INJECTION INTRAMUSCULAR; INTRAVENOUS at 11:05:00

## 2024-09-19 RX ADMIN — DEXAMETHASONE SODIUM PHOSPHATE 8 MG: 4 MG/ML VIAL (ML) INJECTION at 10:30:00

## 2024-09-19 RX ADMIN — ROCURONIUM BROMIDE 25 MG: 10 INJECTION, SOLUTION INTRAVENOUS at 10:22:00

## 2024-09-19 RX ADMIN — PHENYLEPHRINE HCL 150 MCG: 10 MG/ML VIAL (ML) INJECTION at 10:57:00

## 2024-09-19 NOTE — BRIEF OP NOTE
Pre-Operative Diagnosis: Retinal detachment left eye     Post-Operative Diagnosis: Retinal detachment left eye      Procedure Performed:   Pars Plana Vitrectomy, Cryoretinopexy, Gas/Fluid Exchanges, 20% Filtered SF6 Injection Left Eye    Surgeons and Role:     * Damien Dempsey MD - Primary    Assistant(s):  None      Surgical Findings: Retinal Detachment Left Eye     Specimen: None     Estimated Blood Loss: No data recorded    Dictation Number:      Damien Dempsey MD, MD  9/19/2024  11:04 AM

## 2024-09-19 NOTE — ANESTHESIA PROCEDURE NOTES
Airway  Date/Time: 9/19/2024 10:25 AM  Urgency: elective    Airway not difficult    General Information and Staff    Patient location during procedure: OR  Anesthesiologist: Finesse Siddiqui MD  Performed: anesthesiologist   Performed by: Finesse Siddiqui MD  Authorized by: Finesse Siddiqui MD      Indications and Patient Condition  Indications for airway management: anesthesia  Sedation level: deep  Preoxygenated: yes  Patient position: sniffing  MILS maintained throughout  Mask difficulty assessment: 1 - vent by mask    Final Airway Details  Final airway type: endotracheal airway      Successful airway: ETT  Cuffed: yes   Successful intubation technique: Video laryngoscopy  Facilitating devices/methods: intubating stylet  Endotracheal tube insertion site: oral  Blade type: Vaughn video laryngoscope.  Blade size: #3  ETT size (mm): 7.5    Cormack-Lehane Classification: grade I - full view of glottis  Placement verified by: capnometry   Measured from: lips  ETT to lips (cm): 23  Number of attempts at approach: 2  Ventilation between attempts: 2 hand mask    Additional Comments  Vaughn #4 to long with anterior glottic opening, #3 blade with direct visual of cords

## 2024-09-19 NOTE — INTERVAL H&P NOTE
Pre-op Diagnosis: Retinal detachment left eye    The above referenced H&P was reviewed by Damien Dempsey MD, MD on 9/19/2024, the patient was examined and no significant changes have occurred in the patient's condition since the H&P was performed.  I discussed with the patient and/or legal representative the potential benefits, risks and side effects of this procedure; the likelihood of the patient achieving goals; and potential problems that might occur during recuperation.  I discussed reasonable alternatives to the procedure, including risks, benefits and side effects related to the alternatives and risks related to not receiving this procedure.  We will proceed with procedure as planned.

## 2024-09-19 NOTE — ANESTHESIA POSTPROCEDURE EVALUATION
Patient: Han Rangel    Procedure Summary       Date: 09/19/24 Room / Location: [facility] MAIN OR 03 / [facility] MAIN OR    Anesthesia Start: 1017 Anesthesia Stop: 1119    Procedure: Pars plana vitrectomy, cryoretinopexy, gas fluid exchange left eye (Left: Eye) Diagnosis: (Retinal detachment left eye)    Surgeons: Damien Dempsey MD Anesthesiologist: Finesse Siddiqui MD    Anesthesia Type: general ASA Status: 3            Anesthesia Type: general    Vitals Value Taken Time   /93 09/19/24 1147   Temp 97.7 °F (36.5 °C) 09/19/24 1116   Pulse 73 09/19/24 1147   Resp 16 09/19/24 1147   SpO2 94 % 09/19/24 1147       [facility] AN Post Evaluation:   Patient Evaluated in PACU  Patient Participation: complete - patient participated  Level of Consciousness: awake and alert  Pain Score: 0  Pain Management: adequate  Airway Patency:patent  Dental exam unchanged from preop  Yes    Nausea/Vomiting: none  Cardiovascular Status: acceptable  Respiratory Status: acceptable  Postoperative Hydration acceptable      Finesse Siddiqui MD  9/19/2024 1:30 PM

## 2024-09-19 NOTE — ANESTHESIA PREPROCEDURE EVALUATION
Anesthesia PreOp Note    HPI:     Han Rangel is a 68 year old male who presents for preoperative consultation requested by: Damien Dempsey MD    Date of Surgery: 9/19/2024    Procedure(s):  Pars plana vitrectomy, cryoretinopexy, gas fluid exchange left eye  Indication: Retinal detachment left eye    Relevant Problems   No relevant active problems       NPO:  Last Liquid Consumption Date: 09/18/24  Last Liquid Consumption Time: 2100  Last Solid Consumption Date: 09/18/24  Last Solid Consumption Time: 2100  Last Liquid Consumption Date: 09/18/24          History Review:  Patient Active Problem List    Diagnosis Date Noted    Hypertension 07/12/2023    Gastroesophageal reflux disease 01/25/2017    Spinal stenosis of lumbar region 02/06/2014    Lumbosacral spondylosis 02/06/2014    Low back pain 01/31/2014    Sacroiliitis (HCC) 01/31/2014    Pure hypercholesterolemia 01/06/2014       Past Medical History:    Calculus of kidney    Cataract    Esophageal reflux    High blood pressure    High cholesterol    Hyperlipidemia       Past Surgical History:   Procedure Laterality Date    Cataract      Colonoscopy N/A 01/10/2024    Procedure: COLONOSCOPY;  Surgeon: Bari Madera MD;  Location: [facility] ENDO    Colonoscopy      Removal of kidney stone         Medications Prior to Admission   Medication Sig Dispense Refill Last Dose    pantoprazole 40 MG Oral Tab EC Take 1 tablet (40 mg total) by mouth 2 (two) times daily before meals. 180 tablet 3 9/17/2024    hydroCHLOROthiazide 12.5 MG Oral Tab Take 1 tablet (12.5 mg total) by mouth daily.   9/17/2024    valsartan 160 MG Oral Tab Take 1 tablet (160 mg total) by mouth daily.   9/17/2024    Clopidogrel Bisulfate 75 MG Oral Tab Take 1 tablet (75 mg total) by mouth daily. 30 tablet 0 9/16/2024    atorvastatin 10 MG Oral Tab Take 1 tablet (10 mg total) by mouth nightly.   9/16/2024     Current Facility-Administered Medications Ordered in Epic   Medication Dose Route  Frequency Provider Last Rate Last Admin    lactated ringers infusion   Intravenous Continuous Damien Dempsey MD 20 mL/hr at 09/19/24 0919 New Bag at 09/19/24 0919     No current [facility]-ordered outpatient medications on file.       Allergies   Allergen Reactions    Penicillins HIVES     Has never been in the hospital for    Lisinopril OTHER (SEE COMMENTS)     cough       Family History   Problem Relation Age of Onset    Cancer Brother         lung (smoker)    Other (Other) Brother         AAA    Other (Other) Father         AAA     Social History     Socioeconomic History    Marital status:    Tobacco Use    Smoking status: Never    Smokeless tobacco: Never   Vaping Use    Vaping status: Never Used   Substance and Sexual Activity    Alcohol use: Yes     Alcohol/week: 3.0 standard drinks of alcohol     Types: 3 Standard drinks or equivalent per week     Comment: socially    Drug use: No   Other Topics Concern    Caffeine Concern Yes     Comment: coffee-2 cups/day       Available pre-op labs reviewed.  Lab Results   Component Value Date    WBC 5.0 09/17/2024    RBC 4.43 09/17/2024    HGB 14.3 09/17/2024    HCT 40.6 09/17/2024    MCV 91.6 09/17/2024    MCH 32.3 09/17/2024    MCHC 35.2 09/17/2024    RDW 12.4 09/17/2024    .0 09/17/2024             Vital Signs:  Body mass index is 26.04 kg/m².   height is 1.829 m (6') and weight is 87.1 kg (192 lb). His oral temperature is 97.9 °F (36.6 °C). His blood pressure is 140/84 and his pulse is 63. His respiration is 16 and oxygen saturation is 98%.   Vitals:    09/17/24 0857 09/19/24 0842   BP:  140/84   Pulse:  63   Resp:  16   Temp:  97.9 °F (36.6 °C)   TempSrc:  Oral   SpO2:  98%   Weight: 84.8 kg (187 lb) 87.1 kg (192 lb)   Height: 1.829 m (6')         Anesthesia Evaluation     Patient summary reviewed and Nursing notes reviewed    Airway   Mallampati: II  TM distance: <3 FB  Neck ROM: full  Dental      Pulmonary     breath sounds clear to auscultation   Cardiovascular   Exercise tolerance: good  (+) hypertension    Rhythm: regular  Rate: normal    Neuro/Psych      GI/Hepatic/Renal    (+) GERD    Endo/Other    Abdominal                  Anesthesia Plan:   ASA:  3  Plan:   General  Airway:  ETT and Video laryngoscope  Informed Consent Plan and Risks Discussed With:  Patient and spouse  Discussed plan with:  Attending      I have informed Han Rangel and/or legal guardian or family member of the nature of the anesthetic plan, benefits, risks including possible dental damage if relevant, major complications, and any alternative forms of anesthetic management.   All of the patient's questions were answered to the best of my ability. The patient desires the anesthetic management as planned.  Finesse Siddiqui MD  9/19/2024 9:44 AM  Present on Admission:  **None**

## 2024-09-20 ENCOUNTER — TELEPHONE (OUTPATIENT)
Dept: FAMILY MEDICINE CLINIC | Facility: CLINIC | Age: 69
End: 2024-09-20

## 2024-09-20 NOTE — OPERATIVE REPORT
[facility]    PATIENT'S NAME: CT MARIN   ATTENDING PHYSICIAN: Damien Dempsey MD   OPERATING PHYSICIAN: Damien Dempsey MD   PATIENT ACCOUNT#:   [id number]    LOCATION:  [address]  MEDICAL RECORD #:   [id number]       YOB: 1955  ADMISSION DATE:       09/19/2024      OPERATION DATE:  09/19/2024    OPERATIVE REPORT      PREOPERATIVE DIAGNOSIS:  Retinal detachment, left eye.  POSTOPERATIVE DIAGNOSIS:  Retinal detachment, left eye.  PROCEDURE:  Pars plana vitrectomy, cryoretinopexy, gas-fluid exchange, and injection of a 20% mixture of filtered sulfur hexafluoride gas, left eye.    ANESTHESIA:  General with endotracheal intubation plus retrobulbar block, OS.    COMPLICATIONS:  None.    INDICATIONS:  The patient is a 68-year-old gentleman with complaints of a shadow over his peripheral visual field in the left eye beginning 4 days ago.  Examination revealed a visual acuity of 20/25 -1 OD and 20/30 +2 OS.  External and extraocular motility examinations were normal.  Slit lamp examination of the right eye revealed a well-positioned posterior chamber intraocular lens with a mildly hazy posterior capsule.  Slit lamp examination of the left eye revealed a well-positioned posterior chamber intraocular lens with a clear posterior capsule.  His intraocular pressures measured 18 OD and 20 OS.  Dilated fundus examination of the left eye revealed a cup-to-disc ratio of 0.2.  The retinal arterioles were mildly attenuated.  A mild cellophane epiretinal membrane could be seen overlying the surface of the macula.  There was a moderate retinal detachment extending from the 8 to 10 o'clock positions of the retinal periphery.  A retinal tear could be seen at the 8:30 position.  Mild pigment changes could be seen peripherally.  There was a posterior vitreous detachment.  B-scan ultrasonography confirmed the detached nature of the retina nasally.  After a thorough discussion of the  risks, benefits, and alternative treatment options, pars plana vitrectomy with cryoretinopexy and gas-fluid exchange was advised for his left eye.  The risks of surgery discussed included, but were not limited to, pain, infection, bleeding, redness, loss of vision, loss of the eye, need for further surgery, retinal detachment, glaucoma, corneal decompensation, ptosis, diplopia, crossing of the eyes, scarring, swelling, optic neuropathy, as well as anesthetic-related risks such as death, myocardial infarction, stroke, pulmonary embolism, etc.  After careful consideration of above discussion, the patient wished to proceed with the planned surgery for his left eye.    OPERATIVE TECHNIQUE:  The patient was brought to the operating room where general anesthesia with endotracheal intubation was induced without difficulty by the department of anesthesiology.  A retrobulbar injection of a 50/50 mixture of 2% lidocaine and 0.75% Marcaine was given to the retrobulbar space of the left eye using a flat grind, 25-gauge needle.  A total of 4 mL of this anesthetic was delivered to the left retrobulbar space to help control both intraoperative and postoperative pain.  The left eye was then prepped and draped in usual sterile manner.  A heavy wire lid speculum was placed between the lids of the left eye.  A caliper was used to measure 3.5 mm posterior to the corneoscleral limbus in the inferotemporal quadrant.  A 25-gauge trocar was placed in a beveled fashion after appropriate displacement of the conjunctiva through this 3.5 mm sylvia.  The trocar was directed into the vitreous cavity and connected to a 25-gauge infusion cannula.  The position of the infusion cannula was identified within the vitreous cavity and found to be free of retinal tissue.  The infusion was turned on.  Two additional 25-gauge trocars were then placed, 1 in the superotemporal, 1 in the superonasal quadrant.  Each of these trocars were placed 3.5 mm posterior  to the corneoscleral limbus in a beveled fashion after appropriate displacement of the conjunctiva.  Viscoat was placed over the cornea of the left eye.  The fundus was examined with indirect ophthalmoscopy.  The optic nerve head was well perfused.  The macula remained attached.  There was a relatively shallow retinal detachment extending from the 5 to 10 o'clock positions of the retinal periphery.  A small retinal flap tear was seen at the 8:45 position.  Cryopexy was applied to the margins of the retinal break with the endpoint of the treatment being the earliest whitening of the retinal tissues.  Several additional suspicious areas superonasally were treated with cryo.  There were no additional retinal tears were identified.  The endpoint of the treatment for the cryo was the earliest whitening of the retinal tissues with care being taken to maintain minimal treatment directly over the breaks themselves so as to avoid dispersion of the pigment epithelial cells into the vitreous cavity.  Of note, the patient received Decadron 8 mg intravenously via Anesthesiology to help control both intraoperative and postoperative inflammation.  Next, the microscope was positioned over the cornea of the left eye using the binocular indirect operating microscope attachment.  A light pipe was inserted into the superotemporal sclerotomy site and a Microvit into the superonasal sclerotomy site.  With suction set between 50 and 600 mmHg and cutting set at 20,000 cuts per minute, an anterior and core vitrectomy were performed.  As the vitreous was removed, the vitrectomy was carried out further posteriorly and peripherally.  The posterior hyaloid was entered in the superotemporal quadrant and opened in a \"can opener\" fashion for 360 degrees.  The vitreous was then trimmed anteriorly to an appropriate vitreous skirt for 360 degrees.  Vitreous traction from the retinal tear nasally was released and a moderate amount of subretinal fluid  was successfully evacuated through the retinal break.  It was felt that a posterior retinotomy would not be necessary.  The Microvit was removed and a soft-tipped needle on suction was placed through the superonasal sclerotomy site.  An air-fluid exchange was performed to the level of the retina.  Approximately 10 minutes were allowed to elapse for further settling of the vitreous fluid posteriorly.  This was then removed using a soft-tipped needle on suction.  A moderate amount of subretinal fluid remained posteriorly, inferiorly, and nasally.  The macula remained attached.  The instruments were removed.  The smokestack was inserted into the superonasal sclerotomy site.  The infusion cannula was then connected to a 60 mL syringe filled with a 20% mixture of filtered sulfur hexafluoride gas.  Approximately 50 mL of the gas was irrigated through the vitreous cavity with air being allowed to escape through the smokestack.  The superior trocars were then removed and a cotton-tipped applicator was placed over each of the superior sclerotomy sites for a period of approximately 10 seconds.  The wounds were checked and found to be airtight.  The infusion cannula and inferotemporal trocar were removed and a cotton-tipped applicator was placed over the inferotemporal sclerotomy site for a period of approximately 10 seconds.  The wound was checked and found to be airtight.  The intraocular pressure was checked and felt to be appropriate.  Atropine 1% drops were placed over the cornea of the left eye.  TobraDex ointment was placed over the cornea of the left eye.  The lid speculum was removed, and the eyelids closed.  An eye patch and Vu shield were placed over the left eye.  The patient was awakened from anesthesia and brought to the recovery room in excellent condition after having tolerated the procedure well.  There were no complications.    Dictated By Damien Dempsey MD  d: 09/19/2024 11:14:11  t: 09/19/2024  22:37:09  [facility] [id number]  [facility]/

## 2024-09-21 ENCOUNTER — MED REC SCAN ONLY (OUTPATIENT)
Dept: FAMILY MEDICINE CLINIC | Facility: CLINIC | Age: 69
End: 2024-09-21

## 2024-09-23 ENCOUNTER — TELEPHONE (OUTPATIENT)
Dept: CARDIOLOGY | Age: 69
End: 2024-09-23

## 2024-09-30 RX ORDER — ATORVASTATIN CALCIUM 10 MG/1
TABLET, FILM COATED ORAL
Qty: 90 TABLET | Refills: 3 | Status: SHIPPED | OUTPATIENT
Start: 2024-09-30

## 2024-10-07 ENCOUNTER — APPOINTMENT (OUTPATIENT)
Dept: CARDIOLOGY | Age: 69
End: 2024-10-07

## 2024-10-09 ENCOUNTER — LAB SERVICES (OUTPATIENT)
Dept: LAB | Age: 69
End: 2024-10-09

## 2024-10-09 DIAGNOSIS — R93.1 ELEVATED CORONARY ARTERY CALCIUM SCORE: ICD-10-CM

## 2024-10-09 DIAGNOSIS — R06.09 OTHER FORM OF DYSPNEA: ICD-10-CM

## 2024-10-09 DIAGNOSIS — Z82.49 FAMILY HISTORY OF ABDOMINAL AORTIC ANEURYSM (AAA): ICD-10-CM

## 2024-10-09 DIAGNOSIS — E78.00 PURE HYPERCHOLESTEROLEMIA: ICD-10-CM

## 2024-10-09 DIAGNOSIS — I10 ESSENTIAL HYPERTENSION: ICD-10-CM

## 2024-10-09 LAB
ALBUMIN SERPL-MCNC: 3.5 G/DL (ref 3.4–5)
ALBUMIN/GLOB SERPL: 1.3 {RATIO} (ref 1–2.4)
ALP SERPL-CCNC: 76 UNITS/L (ref 45–117)
ALT SERPL-CCNC: 20 UNITS/L
ANION GAP SERPL CALC-SCNC: 8 MMOL/L (ref 7–19)
AST SERPL-CCNC: 17 UNITS/L
BILIRUB SERPL-MCNC: 1.5 MG/DL (ref 0.2–1)
BUN SERPL-MCNC: 14 MG/DL (ref 6–20)
BUN/CREAT SERPL: 15 (ref 7–25)
CALCIUM SERPL-MCNC: 8.6 MG/DL (ref 8.4–10.2)
CHLORIDE SERPL-SCNC: 108 MMOL/L (ref 97–110)
CHOLEST SERPL-MCNC: 154 MG/DL
CHOLEST/HDLC SERPL: 2.9 {RATIO}
CO2 SERPL-SCNC: 30 MMOL/L (ref 21–32)
CREAT SERPL-MCNC: 0.91 MG/DL (ref 0.67–1.17)
DEPRECATED RDW RBC: 40.8 FL (ref 39–50)
EGFRCR SERPLBLD CKD-EPI 2021: >90 ML/MIN/{1.73_M2}
ERYTHROCYTE [DISTWIDTH] IN BLOOD: 12.3 % (ref 11–15)
FASTING DURATION TIME PATIENT: ABNORMAL H
GLOBULIN SER-MCNC: 2.7 G/DL (ref 2–4)
GLUCOSE SERPL-MCNC: 103 MG/DL (ref 70–99)
HCT VFR BLD CALC: 40.4 % (ref 39–51)
HDLC SERPL-MCNC: 53 MG/DL
HGB BLD-MCNC: 14.3 G/DL (ref 13–17)
LDLC SERPL CALC-MCNC: 67 MG/DL
MAGNESIUM SERPL-MCNC: 1.9 MG/DL (ref 1.7–2.4)
MCH RBC QN AUTO: 31.9 PG (ref 26–34)
MCHC RBC AUTO-ENTMCNC: 35.4 G/DL (ref 32–36.5)
MCV RBC AUTO: 90.2 FL (ref 78–100)
NONHDLC SERPL-MCNC: 101 MG/DL
NRBC BLD MANUAL-RTO: 0 /100 WBC
PLATELET # BLD AUTO: 194 K/MCL (ref 140–450)
POTASSIUM SERPL-SCNC: 3.6 MMOL/L (ref 3.4–5.1)
PROT SERPL-MCNC: 6.2 G/DL (ref 6.4–8.2)
RBC # BLD: 4.48 MIL/MCL (ref 4.5–5.9)
SODIUM SERPL-SCNC: 142 MMOL/L (ref 135–145)
TRIGL SERPL-MCNC: 169 MG/DL
WBC # BLD: 6 K/MCL (ref 4.2–11)

## 2024-10-09 PROCEDURE — 36415 COLL VENOUS BLD VENIPUNCTURE: CPT | Performed by: CLINICAL MEDICAL LABORATORY

## 2024-10-09 PROCEDURE — 83735 ASSAY OF MAGNESIUM: CPT | Performed by: CLINICAL MEDICAL LABORATORY

## 2024-10-09 PROCEDURE — 80061 LIPID PANEL: CPT | Performed by: CLINICAL MEDICAL LABORATORY

## 2024-10-09 PROCEDURE — 85027 COMPLETE CBC AUTOMATED: CPT | Performed by: CLINICAL MEDICAL LABORATORY

## 2024-10-09 PROCEDURE — 80053 COMPREHEN METABOLIC PANEL: CPT | Performed by: CLINICAL MEDICAL LABORATORY

## 2024-10-26 ENCOUNTER — HOSPITAL ENCOUNTER (OUTPATIENT)
Age: 69
Discharge: HOME | End: 2024-10-26
Payer: MEDICARE

## 2024-10-26 VITALS
TEMPERATURE: 98 F | DIASTOLIC BLOOD PRESSURE: 63 MMHG | OXYGEN SATURATION: 98 % | RESPIRATION RATE: 18 BRPM | HEART RATE: 101 BPM | SYSTOLIC BLOOD PRESSURE: 105 MMHG

## 2024-10-26 DIAGNOSIS — J01.91 ACUTE RECURRENT SINUSITIS, UNSPECIFIED LOCATION: Primary | ICD-10-CM

## 2024-10-26 PROCEDURE — 99213 OFFICE O/P EST LOW 20 MIN: CPT

## 2024-10-26 RX ORDER — DOXYCYCLINE 100 MG/1
100 CAPSULE ORAL 2 TIMES DAILY
Qty: 14 CAPSULE | Refills: 0 | Status: SHIPPED | OUTPATIENT
Start: 2024-10-26 | End: 2024-11-02

## 2024-10-26 NOTE — ED INITIAL ASSESSMENT (HPI)
Presents for sinus pressure and congestion present since Thursday. Has been using saline flushes at home with mild improvement. Denies fever, cough, and sore throat.

## 2024-10-26 NOTE — DISCHARGE INSTRUCTIONS
At this point, I believe your symptoms are viral in nature.  I did send you a prescription for doxycycline given your history of chronic sinus issues.  If you have any worsening symptoms or develop fever, please take the antibiotics as prescribed for sinusitis.  Please also use Flonase and Mucinex for nasal congestion. May also use over the counter decongestants.  You can also try using a [place] pot/saline rinses for congestion.  Use Tylenol or Motrin for pain or fever.  If you develop any shortness of breath, chest pain, severe headache, fever that does not resolve with medications or any other worsening or concerning symptoms you should go to the emergency department.  Otherwise follow-up with your primary care doctor.

## 2024-10-26 NOTE — ED PROVIDER NOTES
Patient Seen in: [facility]      History     Chief Complaint   Patient presents with    Sinus Problem     Stated Complaint: Sinus infection  Subjective:   Han is a 68-year-old male presenting to the immediate care complaining of sinus symptoms.  Patient states that he has had sinus pressure and congestion for the past 3 days.  States that he has been using sinus rinses for the past few days and this morning he got out a lot of yellow and green mucus so he came in for evaluation.  Patient states that he gets sinus infections at least once a year and always needs antibiotics.  Patient states that he is going out of town in a couple of days and he is requesting a prescription for antibiotics now.  Patient has not had any fever, chest pain, shortness of breath, severe headache, dizziness, weakness, abdominal pain or vomiting.  He is eating and drinking well and is well-hydrated.  He denies any other concerns or complaints.        Objective:   Past Medical History:    Calculus of kidney    Cataract    Esophageal reflux    High blood pressure    High cholesterol    Hyperlipidemia            Past Surgical History:   Procedure Laterality Date    Cataract      Colonoscopy N/A 01/10/2024    Procedure: COLONOSCOPY;  Surgeon: Bari Madera MD;  Location: [facility] ENDO    Colonoscopy      Removal of kidney stone                Social History     Socioeconomic History    Marital status:    Tobacco Use    Smoking status: Never    Smokeless tobacco: Never   Vaping Use    Vaping status: Never Used   Substance and Sexual Activity    Alcohol use: Yes     Alcohol/week: 3.0 standard drinks of alcohol     Types: 3 Standard drinks or equivalent per week     Comment: socially    Drug use: No   Other Topics Concern    Caffeine Concern Yes     Comment: coffee-2 cups/day     Social Drivers of Health     Physical Activity: Sufficiently Active (10/2/2023)    Received from [facility], [facility], [facility]    Exercise Vital Sign     On average, how many days per week do you engage in moderate to strenuous exercise (like a brisk walk)?: 4 days     On average, how many minutes do you engage in exercise at this level?: 40 min            Review of Systems    Positive for stated complaint: Sinus Problem    Other systems are as noted in HPI.  Constitutional and vital signs reviewed.      All other systems reviewed and negative except as noted above.    Physical Exam     ED Triage Vitals [10/26/24 0806]   /63   Pulse 101   Resp 18   Temp 97.7 °F (36.5 °C)   Temp src Temporal   SpO2 98 %   O2 Device None (Room air)     Current:/63   Pulse 101   Temp 97.7 °F (36.5 °C) (Temporal)   Resp 18   SpO2 98%     Physical Exam  Vitals and nursing note reviewed.   Constitutional:       General: He is not in acute distress.     Appearance: Normal appearance. He is not ill-appearing, toxic-appearing or diaphoretic.   HENT:      Head: Normocephalic.      Right Ear: Tympanic membrane, ear canal and external ear normal.      Left Ear: Tympanic membrane, ear canal and external ear normal.      Nose: Congestion present.      Mouth/Throat:      Mouth: Mucous membranes are moist.      Pharynx: Oropharynx is clear.   Eyes:      Conjunctiva/sclera: Conjunctivae normal.   Cardiovascular:      Rate and Rhythm: Normal rate and regular rhythm.      Pulses: Normal pulses.      Heart sounds: Normal heart sounds.   Pulmonary:      Effort: Pulmonary effort is normal. No respiratory distress.      Breath sounds: Normal breath sounds. No stridor. No wheezing, rhonchi or rales.   Abdominal:      General: Abdomen is flat.   Musculoskeletal:         General: Normal range of motion.      Cervical back: Normal range of motion.   Skin:     General: Skin is warm.      Capillary Refill: Capillary refill takes less than 2 seconds.   Neurological:      General: No focal deficit present.      Mental Status: He is alert and  oriented to person, place, and time.   Psychiatric:         Mood and Affect: Mood normal.         Behavior: Behavior normal.         Thought Content: Thought content normal.         Judgment: Judgment normal.         ED Course   Radiology:  No results found.  Labs Reviewed - No data to display    MDM     Medical Decision Making  Differential diagnoses reflecting the complexity of care include but are not limited to viral versus bacterial etiology of upper respiratory complaints.    Comorbidities that add complexity to management include: Chronic sinusitis  History obtained by an independent source was from: Patient  Patient is well appearing, non-toxic and in no acute distress.  Vital signs are stable.    History and physical exam are consistent with sinusitis.  Patient is going out of town and a few days and is requesting a prescription for an antibiotic as he has history of chronic sinusitis and routinely needs antibiotics.  I did have a long discussion with the patient that I do believe his symptoms are viral at this point.  I also reviewed the patient's chart and saw that he follows ENT and a year ago needed 3 courses of antibiotics to treat this sinusitis.  Sent a prescription for doxycycline to treat sinusitis.  I did discuss with the patient that he should wait a few days before starting the antibiotics.  Recommended that he try Flonase, Mucinex and sinus rinses to see if that helps his symptoms prior to starting the antibiotics.    Recommended that patient drink plenty of fluids, use Tylenol and Motrin for pain or fever, use Flonase and Mucinex for nasal congestion and may use over-the-counter medications for congestion as needed.  Also recommended using saline rinses/[place] pot for nasal congestion.  Recommended that if the patient develops any chest pain, respiratory complaints, severe headache, fever that does not improve with medications or any other concerning complaints they should go to the emergency  department.      ED precautions discussed.  Patient (guardian) advised to follow up with PCP in 2-3 days.  Patient (guardian) agrees with this plan of care.  Patient (guardian) verbalizes understanding of discharge instructions and plan of care.      Risk  OTC drugs.  Prescription drug management.        Disposition and Plan     Clinical Impression:  1. Acute recurrent sinusitis, unspecified location         Disposition:  Discharge  10/26/2024  8:23 am    Follow-up:  Jw Spring, DO  [address] [place] [id number]  [phone]                Medications Prescribed:  Discharge Medication List as of 10/26/2024  8:28 AM        START taking these medications    Details   doxycycline 100 MG Oral Cap Take 1 capsule (100 mg total) by mouth 2 (two) times daily for 7 days., Normal, Disp-14 capsule, R-0

## 2024-10-28 ENCOUNTER — APPOINTMENT (OUTPATIENT)
Dept: CARDIOLOGY | Age: 69
End: 2024-10-28

## 2024-10-28 VITALS
BODY MASS INDEX: 25.64 KG/M2 | DIASTOLIC BLOOD PRESSURE: 81 MMHG | OXYGEN SATURATION: 99 % | SYSTOLIC BLOOD PRESSURE: 135 MMHG | HEART RATE: 77 BPM | HEIGHT: 72 IN | WEIGHT: 189.26 LBS

## 2024-10-28 DIAGNOSIS — E78.00 PURE HYPERCHOLESTEROLEMIA: ICD-10-CM

## 2024-10-28 DIAGNOSIS — I10 ESSENTIAL HYPERTENSION: ICD-10-CM

## 2024-10-28 DIAGNOSIS — R93.1 ELEVATED CORONARY ARTERY CALCIUM SCORE: Primary | ICD-10-CM

## 2024-10-28 DIAGNOSIS — R06.09 OTHER FORM OF DYSPNEA: ICD-10-CM

## 2024-10-28 SDOH — HEALTH STABILITY: PHYSICAL HEALTH: ON AVERAGE, HOW MANY MINUTES DO YOU ENGAGE IN EXERCISE AT THIS LEVEL?: 40 MIN

## 2024-10-28 SDOH — HEALTH STABILITY: PHYSICAL HEALTH: ON AVERAGE, HOW MANY DAYS PER WEEK DO YOU ENGAGE IN MODERATE TO STRENUOUS EXERCISE (LIKE A BRISK WALK)?: 4 DAYS

## 2024-10-28 ASSESSMENT — PATIENT HEALTH QUESTIONNAIRE - PHQ9
SUM OF ALL RESPONSES TO PHQ9 QUESTIONS 1 AND 2: 0
2. FEELING DOWN, DEPRESSED OR HOPELESS: NOT AT ALL
SUM OF ALL RESPONSES TO PHQ9 QUESTIONS 1 AND 2: 0
1. LITTLE INTEREST OR PLEASURE IN DOING THINGS: NOT AT ALL
IS THE PATIENT ABLE TO COGNITIVELY COMPLETE THE PHQ9: NO
CLINICAL INTERPRETATION OF PHQ2 SCORE: NO FURTHER SCREENING NEEDED

## 2024-10-29 ENCOUNTER — CLINICAL DOCUMENTATION (OUTPATIENT)
Dept: CARDIOLOGY | Age: 69
End: 2024-10-29

## 2024-10-30 ENCOUNTER — CLINICAL DOCUMENTATION (OUTPATIENT)
Dept: CARDIOLOGY | Age: 69
End: 2024-10-30

## 2024-10-30 ENCOUNTER — ANCILLARY PROCEDURE (OUTPATIENT)
Dept: CARDIOLOGY | Age: 69
End: 2024-10-30
Attending: INTERNAL MEDICINE

## 2024-10-30 ENCOUNTER — TELEPHONE (OUTPATIENT)
Dept: CARDIOLOGY | Age: 69
End: 2024-10-30

## 2024-10-30 DIAGNOSIS — R93.1 ELEVATED CORONARY ARTERY CALCIUM SCORE: ICD-10-CM

## 2024-10-30 DIAGNOSIS — I10 ESSENTIAL HYPERTENSION: ICD-10-CM

## 2024-10-30 DIAGNOSIS — I71.21 ANEURYSM OF ASCENDING AORTA WITHOUT RUPTURE (CMD): ICD-10-CM

## 2024-10-30 DIAGNOSIS — R94.31 ABNORMAL ELECTROCARDIOGRAM (ECG) (EKG): ICD-10-CM

## 2024-10-30 DIAGNOSIS — R06.09 OTHER FORM OF DYSPNEA: ICD-10-CM

## 2024-10-30 DIAGNOSIS — E78.00 PURE HYPERCHOLESTEROLEMIA: ICD-10-CM

## 2024-10-30 DIAGNOSIS — R93.1 ELEVATED CORONARY ARTERY CALCIUM SCORE: Primary | ICD-10-CM

## 2024-10-30 LAB
AORTIC VALVE AREA (AVA): 0.75
AV PEAK GRADIENT (AVPG): 5
AV PEAK VELOCITY (AVPV): 1.1
AV STENOSIS SEVERITY TEXT: NORMAL
AVI LVOT PEAK GRADIENT (LVOTMG): 1.1
E WAVE DECELARATION TIME (MDT): 12.79
LEFT INTERNAL DIMENSION IN SYSTOLE (LVSD): 1
LEFT VENTRICULAR INTERNAL DIMENSION IN DIASTOLE (LVDD): 2.7
LEFT VENTRICULAR POSTERIOR WALL IN END DIASTOLE (LVPW): 4
LV EF: NORMAL %
LVOT VTI (LVOTVTI): 0.96
MV E TISSUE VEL MED (MESV): 6.59
MV E WAVE VEL/E TISSUE VEL MED(MSR): 5.84
MV PEAK A VELOCITY (MVPAV): 226
MV PEAK E VELOCITY (MVPEV): 0.62
RV END SYSTOLIC LONGITUDINAL STRAIN FREE WALL (RVGS): 2
TRICUSPID VALVE PEAK REGURGITATION VELOCITY (TRPV): 4.3
TV ESTIMATED RIGHT ARTERIAL PRESSURE (RAP): 14.3

## 2024-10-30 PROCEDURE — 93306 TTE W/DOPPLER COMPLETE: CPT | Performed by: INTERNAL MEDICINE

## 2024-10-30 PROCEDURE — 93356 MYOCRD STRAIN IMG SPCKL TRCK: CPT | Performed by: INTERNAL MEDICINE

## 2024-11-01 LAB
ATRIAL RATE (BPM): 74
P AXIS (DEGREES): 55
PR-INTERVAL (MSEC): 154
QRS-INTERVAL (MSEC): 74
QT-INTERVAL (MSEC): 414
QTC: 459
R AXIS (DEGREES): 62
REPORT TEXT: NORMAL
T AXIS (DEGREES): -6
VENTRICULAR RATE EKG/MIN (BPM): 74

## 2024-11-05 RX ORDER — HYDROCHLOROTHIAZIDE 12.5 MG/1
TABLET ORAL
Qty: 90 TABLET | Refills: 3 | Status: SHIPPED | OUTPATIENT
Start: 2024-11-05

## 2024-12-05 ENCOUNTER — TELEPHONE (OUTPATIENT)
Dept: CARDIOLOGY | Age: 69
End: 2024-12-05

## 2024-12-06 ENCOUNTER — CLINICAL DOCUMENTATION (OUTPATIENT)
Dept: CARDIOLOGY | Age: 69
End: 2024-12-06

## 2024-12-06 ENCOUNTER — APPOINTMENT (OUTPATIENT)
Dept: CARDIOLOGY | Age: 69
End: 2024-12-06
Attending: INTERNAL MEDICINE

## 2024-12-06 ENCOUNTER — ANCILLARY PROCEDURE (OUTPATIENT)
Dept: CARDIOLOGY | Age: 69
End: 2024-12-06
Attending: INTERNAL MEDICINE

## 2024-12-06 VITALS — HEIGHT: 72 IN | BODY MASS INDEX: 25.6 KG/M2 | WEIGHT: 189 LBS

## 2024-12-06 DIAGNOSIS — R93.1 ELEVATED CORONARY ARTERY CALCIUM SCORE: ICD-10-CM

## 2024-12-06 DIAGNOSIS — I71.21 ANEURYSM OF ASCENDING AORTA WITHOUT RUPTURE (CMD): ICD-10-CM

## 2024-12-06 DIAGNOSIS — R94.31 ABNORMAL ELECTROCARDIOGRAM (ECG) (EKG): ICD-10-CM

## 2024-12-06 LAB
HEART RATE RESERVE PREDICTED: 2.67 BPM
LV EF: 56 %
RESTING HR ACHIEVED: 75 BPM
STRESS BASELINE BP: NORMAL MMHG
STRESS PEAK HR: 146 BPM
STRESS PERCENT HR: 97 %
STRESS POST ESTIMATED WORKLOAD: 9.5 METS
STRESS POST EXERCISE DUR MIN: 10 MIN
STRESS POST EXERCISE DUR SEC: 10 SEC
STRESS POST PEAK BP: NORMAL MMHG
STRESS TARGET HR: 150 BPM

## 2024-12-06 PROCEDURE — 93015 CV STRESS TEST SUPVJ I&R: CPT | Performed by: INTERNAL MEDICINE

## 2024-12-06 PROCEDURE — 78452 HT MUSCLE IMAGE SPECT MULT: CPT | Performed by: INTERNAL MEDICINE

## 2024-12-06 PROCEDURE — A9502 TC99M TETROFOSMIN: HCPCS | Performed by: INTERNAL MEDICINE

## 2024-12-06 ASSESSMENT — EXERCISE STRESS TEST
PEAK_BP: 140/80
MPH: 2.5
PEAK_RPP: 20800
PEAK_HR: 100
STOPPAGE_REASON: GENERAL FATIGUE
PEAK_BP: 140/80
GRADE: 12
PEAK_RPP: 14000
GRADE: 16
MPH: 1.7
PEAK_METS: 9.5
PEAK_RPP: 12870
PEAK_BP: 152/80
PEAK_HR: 146
GRADE: 14
PEAK_HR: 130
STAGE_CATEGORIES: 4
PEAK_RPP: 14700
PEAK_RPP: 16872
PEAK_BP: 160/70
STAGE_CATEGORIES: RECOVERY 0
PEAK_RPP: 9750
PEAK_HR: 99
PEAK_BP: 130/80
PEAK_RPP: 22796
PEAK_HR: 75
PEAK_HR: 105
STAGE_CATEGORIES: 1
STAGE_CATEGORIES: 3
GRADE: 10
PEAK_HR: 111
STAGE_CATEGORIES: RECOVERY 2
PEAK_BP: 130/80
STAGE_CATEGORIES: RECOVERY 1
MPH: 4.1
MPH: 3.5
PEAK_BP: 164/78
PEAK_HR: 139
STAGE_CATEGORIES: 2
STAGE_CATEGORIES: RESTING

## 2024-12-16 RX ORDER — CLOPIDOGREL BISULFATE 75 MG/1
75 TABLET ORAL DAILY
Qty: 90 TABLET | Refills: 3 | Status: SHIPPED | OUTPATIENT
Start: 2024-12-16

## 2024-12-17 ENCOUNTER — OFFICE VISIT (OUTPATIENT)
Dept: OTOLARYNGOLOGY | Facility: CLINIC | Age: 69
End: 2024-12-17
Payer: MEDICARE

## 2024-12-17 DIAGNOSIS — J01.90 ACUTE NON-RECURRENT SINUSITIS, UNSPECIFIED LOCATION: Primary | ICD-10-CM

## 2024-12-17 PROCEDURE — 99213 OFFICE O/P EST LOW 20 MIN: CPT | Performed by: OTOLARYNGOLOGY

## 2024-12-17 RX ORDER — PSEUDOEPHEDRINE HCL 120 MG/1
120 TABLET, FILM COATED, EXTENDED RELEASE ORAL EVERY 12 HOURS
Qty: 60 TABLET | Refills: 3 | Status: SHIPPED | OUTPATIENT
Start: 2024-12-17

## 2024-12-17 RX ORDER — LORATADINE 10 MG/1
10 TABLET ORAL DAILY
Qty: 30 TABLET | Refills: 3 | Status: SHIPPED | OUTPATIENT
Start: 2024-12-17

## 2024-12-17 RX ORDER — LEVOFLOXACIN 500 MG/1
500 TABLET, FILM COATED ORAL EVERY 24 HOURS
Qty: 10 TABLET | Refills: 0 | Status: SHIPPED | OUTPATIENT
Start: 2024-12-17

## 2024-12-17 RX ORDER — MONTELUKAST SODIUM 10 MG/1
10 TABLET ORAL NIGHTLY
Qty: 30 TABLET | Refills: 3 | Status: SHIPPED | OUTPATIENT
Start: 2024-12-17

## 2024-12-17 NOTE — PROGRESS NOTES
Han Rangel is a 68 year old male.    Chief Complaint   Patient presents with    Sinus Problem     Patient reports nasal congestion, reports slight issues breathing due to nasal canal inflammation.        HISTORY OF PRESENT ILLNESS    He presents with a long history of chronic ear infection on the left ear.  States that he has been told that he has swimmer's ear and he feels that he is had it for about 2 years.  Feels that his ears are always itchy primarily on the left side.  Now having some issues on the right side.  No pain no drainage no other signs, symptoms.  He does note some nasal congestion and postnasal drip that started a few days ago.  Feels like he might be developing a cold not having some throat symptoms with throat clearing and raspiness to his voice.  She has seen an ENT in the past and was told that he had ear infections treated with some eardrops.  He has routinely been using neomycin eardrops to deal with this process and even has been using them over the past few days.  Last dose was last night.  Also told by his ENT that he should use some oil drops for his ears to keep them healthy.  He does have a history of grinding his teeth but does not wear a bite guard.     1/11/24 he presents today with long history of chronic nasal sinus issues.  He has been seen by Dr. Saba at [facility] in the past treated with what sounds like irrigations and steroids.  Did well throughout most of the COVID years but states that he started developing problems about 2 months ago where he developed an acute sinus infection treated with doxycycline 7 days with some improvement in his symptoms.  States that by Stan time he felt that things had returned and was given a 7-day course of Levaquin which did seem to help with his nasal mucosal crusting and mucopurulence.  Now feels that things are not 100%.  He will be traveling to [place] and then to [place] for short period time and returning to  [facility] in March or April.  Currently on no medications except for Sudafed as needed     12/17/24 he presents with having vitrectomy back in September of this year had to lay face down for 24 hours and states that since that time he said chronic nasal congestion intranasally.  More recently he has been using OTC nasal spray such as Ronald-Synephrine and Afrin for congestive issues.  He has been having some bleeding from his nose as well.  He was on doxycycline a few months ago for an acute infection at that time.  Continues to feel like he cannot breathe properly like something is caught in the back of his nose by his throat.  No other signs, symptoms or complaints of    Social History     Socioeconomic History    Marital status:    Tobacco Use    Smoking status: Never    Smokeless tobacco: Never   Vaping Use    Vaping status: Never Used   Substance and Sexual Activity    Alcohol use: Yes     Alcohol/week: 3.0 standard drinks of alcohol     Types: 3 Standard drinks or equivalent per week     Comment: socially    Drug use: No   Other Topics Concern    Caffeine Concern Yes     Comment: coffee-2 cups/day       Family History   Problem Relation Age of Onset    Cancer Brother         lung (smoker)    Other (Other) Brother         AAA    Other (Other) Father         AAA       Past Medical History:    Calculus of kidney    Cataract    Esophageal reflux    High blood pressure    High cholesterol    Hyperlipidemia       Past Surgical History:   Procedure Laterality Date    Cataract      Colonoscopy N/A 01/10/2024    Procedure: COLONOSCOPY;  Surgeon: Bari Madera MD;  [facility]: [facility] ENDO    Colonoscopy      Removal of kidney stone           REVIEW OF SYSTEMS    System Neg/Pos Details   Constitutional Negative Fatigue, fever and weight loss.   ENMT Negative Drooling.   Eyes Negative Blurred vision and vision changes.   Respiratory Negative Dyspnea and wheezing.   Cardio Negative Chest pain, irregular  heartbeat/palpitations and syncope.   GI Negative Abdominal pain and diarrhea.   Endocrine Negative Cold intolerance and heat intolerance.   Neuro Negative Tremors.   Psych Negative Anxiety and depression.   Integumentary Negative Frequent skin infections, pigment change and rash.   Hema/Lymph Negative Easy bleeding and easy bruising.           PHYSICAL EXAM    There were no vitals taken for this visit.       Constitutional Normal Overall appearance - Normal.   Psychiatric Normal Orientation - Oriented to time, place, person & situation. Appropriate mood and affect.   Neck Exam Normal Inspection - Normal. Palpation - Normal. Parotid gland - Normal. Thyroid gland - Normal.   Eyes Normal Conjunctiva - Right: Normal, Left: Normal. Pupil - Right: Normal, Left: Normal. Fundus - Right: Normal, Left: Normal.   Neurological Normal Memory - Normal. Cranial nerves - Cranial nerves II through XII grossly intact.   Head/Face Normal Facial features - Normal. Eyebrows - Normal. Skull - Normal.        Nasopharynx Normal External nose - Normal. Lips/teeth/gums - Normal. Tonsils - Normal. Oropharynx - Normal.   Ears Normal Inspection - Right: Normal, Left: Normal. Canal - Right: Normal, Left: Normal. TM - Right: Normal, Left: Normal.   Skin Normal Inspection - Normal.        Lymph Detail Normal Submental. Submandibular. Anterior cervical. Posterior cervical. Supraclavicular.        Nose/Mouth/Throat Normal External nose - Normal. Lips/teeth/gums - Normal. Tonsils - Normal. Oropharynx -postnasal discharge with erythema   Nose/Mouth/Throat Normal Nares - Right: Normal Left: Normal. Septum -Normal  Turbinates - Right: Normal, Left: Normal.  Nasal mucosa congested bilaterally       Current Outpatient Medications:     atorvastatin 10 MG Oral Tab, Take 1 tablet (10 mg total) by mouth nightly., Disp: , Rfl:     pantoprazole 40 MG Oral Tab EC, Take 1 tablet (40 mg total) by mouth 2 (two) times daily before meals., Disp: 180 tablet, Rfl: 3     hydroCHLOROthiazide 12.5 MG Oral Tab, Take 1 tablet (12.5 mg total) by mouth daily., Disp: , Rfl:     valsartan 160 MG Oral Tab, Take 1 tablet (160 mg total) by mouth daily., Disp: , Rfl:     Clopidogrel Bisulfate 75 MG Oral Tab, Take 1 tablet (75 mg total) by mouth daily., Disp: 30 tablet, Rfl: 0    levoFLOXacin 500 MG Oral Tab, Take 1 tablet (500 mg total) by mouth daily., Disp: 10 tablet, Rfl: 0    montelukast 10 MG Oral Tab, Take 1 tablet (10 mg total) by mouth nightly., Disp: 30 tablet, Rfl: 3    loratadine 10 MG Oral Tab, Take 1 tablet (10 mg total) by mouth daily., Disp: 30 tablet, Rfl: 3    pseudoephedrine  MG Oral Tablet 12 Hr, Take 1 tablet (120 mg total) by mouth every 12 (twelve) hours., Disp: 60 tablet, Rfl: 3  ASSESSMENT AND PLAN    1. Acute non-recurrent sinusitis, unspecified location  Start Levaquin Singulair loratadine Sudafed and to me in 1 month if no better if improved I would have him wean his meds 1 at a time.  Perform nasal endoscopy in the office.  - pseudoephedrine  MG Oral Tablet 12 Hr; Take 1 tablet (120 mg total) by mouth every 12 (twelve) hours.  Dispense: 60 tablet; Refill: 3        This note was prepared using Dragon Medical voice recognition dictation software. As a result errors may occur. When identified these errors have been corrected. While every attempt is made to correct errors during dictation discrepancies may still exist    Finesse Bravo MD    12/17/2024    3:21 PM

## 2025-01-15 ENCOUNTER — HOSPITAL ENCOUNTER (OUTPATIENT)
Age: 70
Discharge: HOME | End: 2025-01-15
Payer: MEDICARE

## 2025-01-15 VITALS
HEART RATE: 96 BPM | TEMPERATURE: 98 F | RESPIRATION RATE: 18 BRPM | OXYGEN SATURATION: 97 % | SYSTOLIC BLOOD PRESSURE: 129 MMHG | DIASTOLIC BLOOD PRESSURE: 72 MMHG

## 2025-01-15 DIAGNOSIS — J06.9 VIRAL URI WITH COUGH: Primary | ICD-10-CM

## 2025-01-15 PROCEDURE — 99212 OFFICE O/P EST SF 10 MIN: CPT

## 2025-01-15 NOTE — ED INITIAL ASSESSMENT (HPI)
Patient with cold sx of sore throat and raspy voice x 2 days   On body aches/ chills  Wife + RSV     Patient with flight on Monday

## 2025-01-15 NOTE — ED PROVIDER NOTES
Patient Seen in: [facility]      History     Chief Complaint   Patient presents with    Cough/URI    Sore Throat     Stated Complaint: cold-like symptoms    Subjective:   HPI    69-year-old male with history of hypertension presents to the immediate care with URI symptoms which started yesterday.  Patient states he has been having congestion, sore throat and slight raspy voice for 2 days.  Patient denies chills, chest pain or shortness of breath.  Patient's wife was recently diagnosed with RSV.  Taking Mucinex for symptoms.    Objective:     No pertinent past medical history.            No pertinent past surgical history.              No pertinent social history.            Review of Systems    Positive for stated complaint: cold-like symptoms  Other systems are as noted in HPI.  Constitutional and vital signs reviewed.      All other systems reviewed and negative except as noted above.    Physical Exam     ED Triage Vitals [01/15/25 0808]   /72   Pulse 96   Resp 18   Temp 97.6 °F (36.4 °C)   Temp src Oral   SpO2 97 %   O2 Device None (Room air)       Current Vitals:   Vital Signs  BP: 129/72  Pulse: 96  Resp: 18  Temp: 97.6 °F (36.4 °C)  Temp src: Oral    Oxygen Therapy  SpO2: 97 %  O2 Device: None (Room air)        Physical Exam  Vitals and nursing note reviewed.   Constitutional:       General: He is not in acute distress.  HENT:      Head: Normocephalic and atraumatic.      Right Ear: External ear normal.      Left Ear: External ear normal.      Nose: Nose normal.      Mouth/Throat:      Mouth: Mucous membranes are moist.   Eyes:      Extraocular Movements: Extraocular movements intact.      Pupils: Pupils are equal, round, and reactive to light.   Cardiovascular:      Rate and Rhythm: Normal rate.      Heart sounds: No murmur heard.  Pulmonary:      Effort: Pulmonary effort is normal.      Breath sounds: No wheezing.   Abdominal:      General: Abdomen is flat.   Musculoskeletal:          General: Normal range of motion.      Cervical back: Normal range of motion.   Skin:     General: Skin is warm.   Neurological:      General: No focal deficit present.      Mental Status: He is alert and oriented to person, place, and time.   Psychiatric:         Mood and Affect: Mood normal.         Behavior: Behavior normal.             ED Course   Labs Reviewed - No data to display     69-year-old male presenting with URI symptoms which started yesterday.  Patient is afebrile in the immediate care.  No hypoxia, no tachycardia  Ddx-viral URI, influenza, COVID    Patient declines any additional testing.  He appears well and is breathing comfortably.  We discussed supportive measures, continued monitoring and return precautions     MDM              Medical Decision Making      Disposition and Plan     Clinical Impression:  1. Viral URI with cough         Disposition:  Discharge  1/15/2025  8:38 am    Follow-up:  Jw Spring, DO  [address] [facility] [address]  [phone]                Medications Prescribed:  Discharge Medication List as of 1/15/2025  8:45 AM              Supplementary Documentation:

## 2025-01-19 ENCOUNTER — HOSPITAL ENCOUNTER (OUTPATIENT)
Age: 70
Discharge: HOME | End: 2025-01-19
Payer: MEDICARE

## 2025-01-19 VITALS
HEART RATE: 93 BPM | SYSTOLIC BLOOD PRESSURE: 121 MMHG | RESPIRATION RATE: 20 BRPM | OXYGEN SATURATION: 100 % | DIASTOLIC BLOOD PRESSURE: 90 MMHG | TEMPERATURE: 98 F

## 2025-01-19 DIAGNOSIS — J01.90 ACUTE NON-RECURRENT SINUSITIS, UNSPECIFIED LOCATION: Primary | ICD-10-CM

## 2025-01-19 PROCEDURE — 99213 OFFICE O/P EST LOW 20 MIN: CPT

## 2025-01-19 PROCEDURE — 99214 OFFICE O/P EST MOD 30 MIN: CPT

## 2025-01-19 RX ORDER — DOXYCYCLINE 100 MG/1
100 CAPSULE ORAL 2 TIMES DAILY
Qty: 14 CAPSULE | Refills: 0 | Status: SHIPPED | OUTPATIENT
Start: 2025-01-19 | End: 2025-01-26

## 2025-01-19 NOTE — ED PROVIDER NOTES
Patient Seen in: [facility]    History   CC: cough  HPI: Han Rangel 69 year old male  who presents c/o cough, congestion, runny nose, pnd, sinus pressure/pain. Denies nba, cp, gi s/s, rash. States s/s have been present over the last 1wk, not improving.     Past Medical History:    Calculus of kidney    Cataract    Esophageal reflux    High blood pressure    High cholesterol    Hyperlipidemia       Past Surgical History:   Procedure Laterality Date    Cataract      Colonoscopy N/A 01/10/2024    Procedure: COLONOSCOPY;  Surgeon: Bari Madera MD;  Location: [facility] END[facility]    Colonoscopy      Removal of kidney stone         Family History   Problem Relation Age of Onset    Cancer Brother         lung (smoker)    Other (Other) Brother         AAA    Other (Other) Father         AAA       Social History     Socioeconomic History    Marital status:    Tobacco Use    Smoking status: Never    Smokeless tobacco: Never   Vaping Use    Vaping status: Never Used   Substance and Sexual Activity    Alcohol use: Yes     Alcohol/week: 3.0 standard drinks of alcohol     Types: 3 Standard drinks or equivalent per week     Comment: socially    Drug use: No   Other Topics Concern    Caffeine Concern Yes     Comment: coffee-2 cups/day     Social Drivers of Health     Physical Activity: Low Risk  (10/28/2024)    Received from [facility]    Exercise Vital Sign     On average, how many days per week do you engage in moderate to strenuous exercise (like a brisk walk)?: 4 days     On average, how many minutes do you engage in exercise at this level?: 40 min       ROS:  Systems reviewed: All pertinent positives noted in HPI. Unless otherwise noted, additional systems reviewed are negative.   Vital signs reviewed.    Positive for stated complaint: Cold/Cough/Chest Tightness  Other systems are as noted in HPI.  Constitutional and vital signs reviewed.      All other systems reviewed and negative  except as noted above.    PSFH elements reviewed from today and agreed except as otherwise stated in HPI.             Constitutional and vital signs reviewed.        Physical Exam     ED Triage Vitals [01/19/25 1307]   /90   Pulse 93   Resp 20   Temp 98 °F (36.7 °C)   Temp src Oral   SpO2 100 %   O2 Device None (Room air)       Current:/90   Pulse 93   Temp 98 °F (36.7 °C) (Oral)   Resp 20   SpO2 100%         PE:  General - Appears well, non-toxic and in NAD  Head - Appears symmetrical without deformity/swelling cranium, scalp, or facial bones  Eyes - sclera not injected, no discharge noted, no periorbital edema  ENT - EAC bilaterally without discharge, TM pearly grey with COL visualized appropriately bilaterally.   no nasal drainage noted in nares bilat, no cobblestoning to post. Pharynx.   Oropharynx clear, posterior pharynx is without erythema and without tonsilar enlargement or exudate, uvula midline, +gag, voice is clear. No trismus  Neck - no significant adenopathy, supple with trachea midline  Resp - Lung sounds clear bilaterally and wob unlabored, good aeration with equal, even expansion bilaterally   CV - RRR  Skin - no rashes or petechiae noted, pink warm and dry throughout, mmm, cap refill <2seconds  Neuro - A&O x4, steady gait  MSK - makes purposeful movements of all extremities, radial pulses 2+ bilat.  Psych - Interactive and appropriate      ED Course   Labs Reviewed - No data to display    MDM     DDx: Viral versus bacterial sinusitis, seasonal rhinitis, PNA, COVID-19, influenza, unspecified viral illness    Patient with URI signs/symptoms which have been present for the last 1 week, not improving.  Patient with sinus pressure and discomfort and history of sinusitis.  Penicillin allergic.  His wife recently tested positive for RSV and is currently being treated for mycoplasma pneumonia.  Discussed doxycycline would treat for atypical pneumonia as well as bacterial sinusitis.  Sinus  rinses, Flonase, hydration instructions, Tylenol or Motrin as needed for discomfort, follow-up and return/ED precautions reviewed. Patient is historian and demonstrates understanding of all instruction and agrees with plan of care.      Disposition and Plan     Clinical Impression:  1. Acute non-recurrent sinusitis, unspecified location        Disposition:  Discharge    Follow-up:  Jw Spring, DO  [address]  [place] [address]  [phone]    Go in 1 week  As needed      Medications Prescribed:  Current Discharge Medication List

## 2025-01-19 NOTE — ED INITIAL ASSESSMENT (HPI)
Was seen here 1/15, still with cough and sinus congestion, no fever, no sob, travelling to [place] and wants antibiotic

## 2025-01-19 NOTE — DISCHARGE INSTRUCTIONS
Take the full course of antibiotics as prescribed, even if you begin to feel better. Make sure to stay well hydrated with clear fluids. You can use Tylenol or Motrin every 6 hours to control fever or discomfort. You can use both Tylenol and Motrin, but alternate them so that you're getting one every 3 hours. Sinus rinses with a netti pot or sterile nasal sprays can be very helpful in clearing your congestion. Flonase, a nasal steroid spray as well as nasal decongestants such as Sudafed can also be very helpful. Flonase can be administered 1 spray in each nostril 2x/day, ideally after a sinus irrigation/rinse. Sudafed is a stimulant, so using a formulation that is less than 24 hours is recommended, and not recommended for anyone with heart conditions or in pregnancy. Using a humidifier in the bedroom at night can also be helpful. Follow up with your primary care provider or the one provided in your discharge instructions in the next 2-3 days. Seek additional care in the ER for new or worsening symptoms or for fever not controlled with Tylenol & Motrin.

## 2025-04-08 ENCOUNTER — TELEPHONE (OUTPATIENT)
Dept: PULMONOLOGY | Facility: CLINIC | Age: 70
End: 2025-04-08

## 2025-04-08 NOTE — TELEPHONE ENCOUNTER
Dr Bravo- see message below received mychart from his wife. Next consult opening not until 6/5/25, let us know if you would be able to add patient in sooner? Wife is very concerned about results, requesting he been seen as soon as possible.

## 2025-04-08 NOTE — TELEPHONE ENCOUNTER
Hi Dr Bravo>  We are in [place] and some pulmonary issues have come up.  We are coming back to [place] on May2.  He needs to see a pulmonologist and more likely than not needs a bronchoscopy to determine the etiology of the lesion and nodules in his lung.  I am attaching the CT chest results.  When I called for an appt with you, I was told he could not get in until July 15.  As you can imagine, we are very concerned about his CT results.  Is there any way you can see anytime after May 2?  I would greatly appreciate any help.  Thanks!

## 2025-04-08 NOTE — TELEPHONE ENCOUNTER
Dr Bravo- patient's wife states patient will actually be in town this week for his mother's , and wanted to know if possible for him to be added to schedule sometime this week?

## 2025-04-09 NOTE — TELEPHONE ENCOUNTER
Spoke with patient's wife Leti. Appointment scheduled for 5/6/25 at 10:15AM. Confirmed date, time, place. Wife verbalized understanding.

## 2025-04-11 ENCOUNTER — TELEPHONE (OUTPATIENT)
Dept: PULMONOLOGY | Facility: CLINIC | Age: 70
End: 2025-04-11

## 2025-04-11 NOTE — TELEPHONE ENCOUNTER
Patient has appointment for abnormal imaging 4/24/25 12 pm ([facility]). Please also see telephone encounter 4/8/25 for Dr. Bravo. Thanks.    Dr. Bravo- patient to keep in-person office visit?

## 2025-04-11 NOTE — TELEPHONE ENCOUNTER
Patient wants to know if the consult appointment for 4/24/25 can be a virtual video visit? Patient states that he will keep his appointment as in office if he is not able to change appointment to video visit.

## 2025-04-16 NOTE — TELEPHONE ENCOUNTER
Patient aware of Dr. Bravo's response below. Requests in-person appointment tomorrow or Friday if possible. Confirmed with patient okay to communicate via [employer].

## 2025-04-16 NOTE — TELEPHONE ENCOUNTER
Rescheduled patient with Dr. Bravo on 4/17/25 12:30 pm ([facility]). Appointment info given. He voiced appreciation.

## 2025-04-17 ENCOUNTER — HOSPITAL ENCOUNTER (OUTPATIENT)
Dept: GENERAL RADIOLOGY | Facility: HOSPITAL | Age: 70
Discharge: HOME | End: 2025-04-17
Attending: INTERNAL MEDICINE
Payer: MEDICARE

## 2025-04-17 ENCOUNTER — OFFICE VISIT (OUTPATIENT)
Dept: PULMONOLOGY | Facility: CLINIC | Age: 70
End: 2025-04-17
Payer: MEDICARE

## 2025-04-17 VITALS
OXYGEN SATURATION: 95 % | WEIGHT: 188 LBS | HEIGHT: 72 IN | HEART RATE: 92 BPM | SYSTOLIC BLOOD PRESSURE: 101 MMHG | RESPIRATION RATE: 14 BRPM | BODY MASS INDEX: 25.47 KG/M2 | DIASTOLIC BLOOD PRESSURE: 59 MMHG

## 2025-04-17 DIAGNOSIS — R76.8 ANA POSITIVE: Primary | ICD-10-CM

## 2025-04-17 DIAGNOSIS — R91.1 LUNG NODULE: ICD-10-CM

## 2025-04-17 PROCEDURE — 99204 OFFICE O/P NEW MOD 45 MIN: CPT | Performed by: INTERNAL MEDICINE

## 2025-04-17 PROCEDURE — 71046 X-RAY EXAM CHEST 2 VIEWS: CPT | Performed by: INTERNAL MEDICINE

## 2025-04-17 NOTE — H&P
Referring Physician  Jw Spring DO    Chief Complaint  Recent fevers chills, abnormal chest imaging    History of Present Illness  Patient is a 69-year-old male with no significant past pulmonary history who presents with chief complaint of recent history of fevers night sweats approximately 3 weeks ago while in [place].  States he had unremarkable workup for valley fever.  [facility] symptoms started improving after 1 week.  CT chest performed on 4/4/2025 with evidence of masslike consolidation seen in left upper lobe.  Denies history of tobacco abuse.  Prescribed 7-day course of Levaquin which she has completed.  [facility] symptoms significantly improved at this time.  Patient also had recent blood work positive for rheumatoid factor and MILAGROS.  Denies any history of known rheumatological disease.  Denies any joint pain chronic rashes.    Review of Systems  Constitutional: denies weight loss, fevers, chills, weakness, fatigue, recent illness  HEENT: denies epistaxis, sore throat, postnasal drip  Cardio: denies chest pain, chest pressure, palpitations  Respiratory: denies dyspnea, cough, wheezing, hemoptysis   GI: denies nausea, vomiting, abdominal pain  : denies dysuria, hematuria  Musculoskeletal: denies arthralgia, myalgia  Integumentary: denies rash, itching  Neurological: denies syncope, weakness, dizziness,   Psychiatric: denies depression, anxiety  Hematologic: denies bruising    Past Medical History  Past Medical History[1]     Surgical History  Past Surgical History[2]    Family History  Family History[3]     Social History  Tobacco: Denies  Alcohol: Denies significant intake  Illicit Drugs: Denies    Medications  Medications Ordered Prior to Encounter[4]    Allergies  Allergies[5]    Physical Exam  Constitutional: no acute distress  HEENT: PERRL  Neck: supple, no JVD  Cardio: RRR, S1 S2  Respiratory: clear to auscultation bilaterally, no wheezing, rales, rhonchi, crackles  GI: abdomen soft, non tender,  active bowel sounds, no organomegaly  Extremities: no clubbing, cyanosis, edema  Neurologic: no gross motor deficits  Skin: warm, dry  Lymphatic: no supraclavicular lymphadenopathy     Imaging  CT chest at outside institution on 4/4/2025 with left upper lobe 5 cm masslike consolidation seen with additional consolidation in left upper lobe measuring 1.5 cm in size.  Evidence of mediastinal lymphadenopathy present within AP window.    Pulmonary Function Testing  None available to review    Assessment  1.  Left upper lobe masslike lung opacity  2.  Likely pneumonia  3.  Positive MILAGROS    Plan  -Patient presents today for pulm evaluation.  3 weeks ago presented with fatigue malaise fevers night sweats.  Significant clinical improvement.  Treated with course of Levaquin therapy earlier this month.  I reviewed CT chest with evidence of 5 cm masslike consolidation in the left upper lobe along with additional area of nodularity/consolidation seen within the left upper lobe.  Clinical and radiographic findings more suggestive of infectious etiology.  Will obtain follow-up chest x-ray to monitor today with repeat CT chest ordered for 4-week interval.  Hold off further antibiotic therapy at this time.  Depending on radiographic resolution we will make further recommendations but for now recommend pulling off bronchoscopy for now.  - Patient with recent blood workup within [place] revealing positive rheumatoid factor and MILAGROS.  Denies any history of known rheumatological disease.  I have placed rheumatology referral    Brennen Bravo DO  Pulmonary Critical Care Medicine  [facility]  4/17/2025  1:35 PM        [1]   Past Medical History:   Calculus of kidney    Cataract    Esophageal reflux    High blood pressure    High cholesterol    Hyperlipidemia   [2]   Past Surgical History:  Procedure Laterality Date    Cataract      Colonoscopy N/A 01/10/2024    Procedure: COLONOSCOPY;  Surgeon: Bari Madera MD;  Location:  [facility]    Colonoscopy      Removal of kidney stone     [3]   Family History  Problem Relation Age of Onset    Cancer Brother         lung (smoker)    Other (Other) Brother         AAA    Other (Other) Father         AAA   [4]   Current Outpatient Medications on File Prior to Visit   Medication Sig Dispense Refill    atorvastatin 10 MG Oral Tab Take 1 tablet (10 mg total) by mouth nightly.      pantoprazole 40 MG Oral Tab EC Take 1 tablet (40 mg total) by mouth 2 (two) times daily before meals. 180 tablet 3    hydroCHLOROthiazide 12.5 MG Oral Tab Take 1 tablet (12.5 mg total) by mouth daily.      valsartan 160 MG Oral Tab Take 1 tablet (160 mg total) by mouth daily.      pseudoephedrine  MG Oral Tablet 12 Hr Take 1 tablet (120 mg total) by mouth every 12 (twelve) hours. 60 tablet 3     No current facility-administered medications on file prior to visit.   [5]   Allergies  Allergen Reactions    Penicillins HIVES     Has never been in the hospital for    Lisinopril OTHER (SEE COMMENTS)     cough

## 2025-04-23 ENCOUNTER — TELEPHONE (OUTPATIENT)
Dept: PULMONOLOGY | Facility: CLINIC | Age: 70
End: 2025-04-23

## 2025-04-23 NOTE — TELEPHONE ENCOUNTER
Han montes [facility] Pulmo Clinical Staff (supporting Brennen Bravo DO) (Selected Message)        4/22/25 10:29 AM  Can we please arrange a phone call to discuss the results of the CXR, and the next step?  Since the consolidation and lesion are still there, would it prudent to move forward with the bronchoscopy instead of waiting for the CT scan?  had the CXR last Friday.

## 2025-04-24 ENCOUNTER — PATIENT MESSAGE (OUTPATIENT)
Dept: PULMONOLOGY | Facility: CLINIC | Age: 70
End: 2025-04-24

## 2025-04-24 NOTE — TELEPHONE ENCOUNTER
Even though chest x-ray did not show significant change CT chest would give much more detail and help better plan what we should do next in terms of potential bronchoscopy.  Chest x-ray is not detailed enough.  I would still stick with original timeline of CT chest and follow-up with recommendations afterwards.

## 2025-04-28 ENCOUNTER — HOSPITAL ENCOUNTER (OUTPATIENT)
Dept: CT IMAGING | Age: 70
Discharge: HOME | End: 2025-04-28
Attending: INTERNAL MEDICINE
Payer: MEDICARE

## 2025-04-28 DIAGNOSIS — R91.1 LUNG NODULE: ICD-10-CM

## 2025-04-28 PROCEDURE — 71260 CT THORAX DX C+: CPT | Performed by: INTERNAL MEDICINE

## 2025-05-01 ENCOUNTER — OFFICE VISIT (OUTPATIENT)
Dept: FAMILY MEDICINE CLINIC | Facility: CLINIC | Age: 70
End: 2025-05-01
Payer: MEDICARE

## 2025-05-01 VITALS
SYSTOLIC BLOOD PRESSURE: 137 MMHG | WEIGHT: 186 LBS | RESPIRATION RATE: 18 BRPM | HEIGHT: 72 IN | TEMPERATURE: 98 F | HEART RATE: 75 BPM | OXYGEN SATURATION: 97 % | DIASTOLIC BLOOD PRESSURE: 77 MMHG | BODY MASS INDEX: 25.19 KG/M2

## 2025-05-01 DIAGNOSIS — R91.8 LUNG MASS: Primary | ICD-10-CM

## 2025-05-01 DIAGNOSIS — M05.80 POLYARTHRITIS WITH POSITIVE RHEUMATOID FACTOR (HCC): ICD-10-CM

## 2025-05-01 PROCEDURE — 99214 OFFICE O/P EST MOD 30 MIN: CPT | Performed by: FAMILY MEDICINE

## 2025-05-01 NOTE — PROGRESS NOTES
Subjective:   Han Rangel is a 69 year old male who presents for Arthritis (Follow up on rheumatoid arthritis )       History/Other:   History of Present Illness  Han Rangel is a 69 year old male who presents with concerns about a mediastinal lesion and joint pain.    He recently underwent a CT scan, but results are pending due to a delay in radiologist availability. Initially, he experienced symptoms such as cold sweats and fatigue, which have since resolved. He was concerned about a possible infection, as his CRP levels were elevated but have since decreased. No blood tests have been conducted in the past three weeks.    He has a history of joint issues, with current symptoms including pain in the knees, tightness in the hands, and shoulder discomfort. These symptoms do not significantly limit his activities, such as golfing, but he is concerned about potential worsening. He has not been on any medication for arthritis. He mentions a positive rheumatoid factor and MILAGROS screen, raising concerns about a possible autoimmune condition.    He has experienced a mediastinal lesion and multiple nodules in the lungs, with a history of negative tests for coccidioidomycosis and sarcoidosis. He is awaiting further evaluation by a rheumatologist, which is currently scheduled for several months away.    He experienced a temporary loss of appetite, leading to a weight loss of about seven to eight pounds, but his appetite has since normalized. He also had a rash on both arms on two different occasions, which resolved quickly.    He had a retinal tear and vitrectomy in the past, requiring him to sleep on his left side for three weeks, which he believes may have contributed to his shoulder discomfort. He spends part of the year in [place] and part in [place], and is planning to travel to [place] in two weeks.   Chief Complaint Reviewed and Verified  Nursing Notes Reviewed and   Verified  Tobacco Reviewed   Allergies Reviewed  Medications Reviewed    Problem List Reviewed  Medical History Reviewed  Surgical History   Reviewed  Family History Reviewed  Social History Reviewed         Tobacco:  He has never smoked tobacco.    Current Medications[1]           Review of Systems:  Review of Systems   Constitutional: Negative.    Respiratory: Negative.     Cardiovascular: Negative.    Musculoskeletal:  Positive for arthralgias.          Objective:   /77   Pulse 75   Temp 97.8 °F (36.6 °C) (Temporal)   Resp 18   Ht 6' (1.829 m)   Wt 186 lb (84.4 kg)   SpO2 97%   BMI 25.23 kg/m²  Estimated body mass index is 25.23 kg/m² as calculated from the following:    Height as of this encounter: 6' (1.829 m).    Weight as of this encounter: 186 lb (84.4 kg).  Physical Exam  Vitals reviewed.   Cardiovascular:      Rate and Rhythm: Normal rate and regular rhythm.      Heart sounds: Normal heart sounds.   Pulmonary:      Effort: Pulmonary effort is normal.      Breath sounds: Normal breath sounds.   Musculoskeletal:      Left shoulder: Normal.        Results  LABS  Rheumatoid factor: positive  MILAGROS screen: positive  CRP: decreased    RADIOLOGY  Chest x-ray: multiple nodules (04/07/2025)  Chest CT: mediastinal lesion, multiple nodules (04/07/2025)  Chest x-ray: similar to previous (04/24/2025)  Abdomen scan: normal     Physical Exam          Assessment & Plan:   Assessment & Plan  Lung mass         Polyarthritis with positive rheumatoid factor (HCC)    Orders:    Rheumatology Referral - [facility] ([facility])       Assessment & Plan  Mediastinal lesion and lung nodules  Mediastinal lesion and lung nodules with unclear etiology. Differential includes infection, autoimmune conditions, and sarcoidosis. Decreased CRP suggests possible infection resolution. Awaiting second CT scan results for further assessment. Pulmonologist Dr. Charles to determine management, possibly bronchoscopy if infection suspected.  - Await results of the  second CT scan.  - Follow up with pulmonologist Dr. Charles for further management based on CT scan results.  - Consider bronchoscopy if infection is still suspected after CT scan results.    Rheumatoid arthritis  Rheumatoid arthritis with positive rheumatoid factor and MILAGROS. Joint pain in knees, shoulders, and hands without severe activity limitation. Referral to rheumatology pending, exploring earlier consultation options.  - Send referral to rheumatology at [facility] to explore earlier appointment availability.  - Advise stretching exercises for shoulder flexibility.    Retinal detachment  Retinal detachment with previous vitrectomy. No current issues reported.    Goals of Care  DNR order discussed and paperwork completed, influenced by family experiences.  - Have staff witness and sign the DNR form.  - Scan and file the DNR form in his chart.    Follow-up  Pending actions based on CT scan results and rheumatology referral. Travel to [place] planned in two weeks, health status permits travel.  - Await contact from Dr. Charles regarding CT scan results and further pulmonary management.  - Contact rheumatology at [facility] for earlier appointment availability.        No follow-ups on file.        Jw Spring DO, 5/1/2025, 5:13 PM           [1]   Current Outpatient Medications   Medication Sig Dispense Refill    pseudoephedrine  MG Oral Tablet 12 Hr Take 1 tablet (120 mg total) by mouth every 12 (twelve) hours. 60 tablet 3    atorvastatin 10 MG Oral Tab Take 1 tablet (10 mg total) by mouth nightly.      pantoprazole 40 MG Oral Tab EC Take 1 tablet (40 mg total) by mouth 2 (two) times daily before meals. 180 tablet 3    hydroCHLOROthiazide 12.5 MG Oral Tab Take 1 tablet (12.5 mg total) by mouth daily.      valsartan 160 MG Oral Tab Take 1 tablet (160 mg total) by mouth daily.

## 2025-05-06 ENCOUNTER — OFFICE VISIT (OUTPATIENT)
Age: 70
End: 2025-05-06
Payer: MEDICARE

## 2025-05-06 ENCOUNTER — TELEPHONE (OUTPATIENT)
Dept: PULMONOLOGY | Facility: CLINIC | Age: 70
End: 2025-05-06

## 2025-05-06 VITALS
HEIGHT: 72 IN | BODY MASS INDEX: 24.52 KG/M2 | WEIGHT: 181 LBS | DIASTOLIC BLOOD PRESSURE: 81 MMHG | SYSTOLIC BLOOD PRESSURE: 137 MMHG | HEART RATE: 82 BPM

## 2025-05-06 DIAGNOSIS — M25.50 POLYARTHRALGIA: ICD-10-CM

## 2025-05-06 DIAGNOSIS — R76.8 POSITIVE ANA (ANTINUCLEAR ANTIBODY): Primary | ICD-10-CM

## 2025-05-06 DIAGNOSIS — R91.1 LUNG NODULE: Primary | ICD-10-CM

## 2025-05-06 PROCEDURE — 99205 OFFICE O/P NEW HI 60 MIN: CPT | Performed by: INTERNAL MEDICINE

## 2025-05-06 NOTE — PROGRESS NOTES
Han Rangel is a 69 year old male who presents for   Chief Complaint   Patient presents with    Consult     NP Referred by Pulmonologist  and also had labs done in [place] and it showed a Positive MILAGROS and elevated CRP     Joint Pain   .   HPI:   CC: joint pain, +MILAGROS, CRP  Consult: referred by PCP Dr. Spring     This is a 68 yo M with hx of HTN, HLD, GERD referred for abnormal blood work. He is in [place] 4 mos out of the year. He will be traveling to [facility] next week. Last week of March he was legthargi, had cold sweats and loss of appetite. He was seen by his PCP and blood work was done showing +MILAGROS, RF, elevated CRP. Had a CT chest showing CHAD mass.  He was put on levaquin for about 7 days. CRP went from 59-->5  He has some mild joint pain in the hands, knees. He use to run in the past and was told he has osteoarthritis in the knees. He is still active and plays golf.  He has some left shoulder pain for the past few months. He had retina surgery Sept 2024 and had to lay on left side fro 3 weeks and since then continues to have left shoulder pain.   No psoriasis. He had a rash on left forearm that lasted 1 day.   He has hx of sinus issues and sinusitis and will get it once a year.   No hx of sicca symptoms, oral ulcers, hair loss, lymphadenopathy, serositis, DVT/PE, miscarriages, raynaunds. No chest pain or sob.   He is following with pulmonology Dr. Bravo. Plan for bronchoscopy this Friday   He had fevers initially for a few days, 4-5 days but it has resolved.   He has had some weight loss, about 7 pounds.  No family hx of autoimmune disease.    Blood work:  MILAGROS <1:40, low C4 13  RF 52, CRP 59 mg/L--> 5.6 mg/L  CT chest: CHAD mass like consolidation, mediastinal adenopathy         Wt Readings from Last 2 Encounters:   05/06/25 181 lb (82.1 kg)   05/01/25 186 lb (84.4 kg)     Body mass index is 24.55 kg/m².      Current Medications[1]   Past Medical History[2]   Past Surgical History[3]    Family History[4]   Social History:  Short Social Hx on File[5]        REVIEW OF SYSTEMS:   Review Of Systems:  Constitutional: No fever, no change in weight or appetitie  Derm: No rashes, no oral ulcers, no alopecia, no photosensitivity, no psoriasis  HEENT: No dry eyes, no dry mouth, no Raynaud's, no nasal ulcers, no parotid swelling, no neck pain, no jaw pain, no temple pain  Eyes: No visual changes,   CVS: No chest pain, no heart disease  RS: No SOB, no Cough, No Pleurtic pain,   GI: No nausea, no vomiiting, no abominal pain, no hx of ulcer, no gastritis, no heartburn, no dyshpagia, no BRBPR or melena  : no dysuria, no hx of miscarriages, no DVT Hx, no hx of OCP,   Neuro: No numbness or tingling, no headache, no hx of seizures,   Psych: no hx of anxiety or depression  ENDO: no hx of thyroid disease, no hx of DM  Joint/Muscluskeltal: see HPI,   All other ROS are negative.     EXAM:   /81 (BP Location: Right arm, Patient Position: Sitting, Cuff Size: adult)   Pulse 82   Ht 6' (1.829 m)   Wt 181 lb (82.1 kg)   BMI 24.55 kg/m²   GEN: AAOx3, NAD  HEENT: EOMI, PERRLA, no injection or icterus, oral mucosa moist, no oral lesions. No lymphadenopathy. No facial rash  CVS: RRR, no murmurs rubs or gallops. Equal 2+ distal pulses.   LUNGS: CTAB, no increased work of breathing  ABDOMEN:  soft NT/ND, +BS, no HSM  SKIN: No rashes or skin lesions. No nail findings  MSK:  Positive Heberden's nodes noted.  Able to make a full fist  No swelling or synovitis on exam  Full range of motion in all joint  NEURO: Cranial nerves II-XII intact grossly. 5/5 strength throughout in both upper and lower extremities, sensation intact.  PSYCH: normal mood    LABS:     Reviewed     IMAGING:     CT chest 4/2025:  Posterior left upper lobe masslike consolidation similar in size to the prior outside examination 4/4/25.  Given lack of significant interval change, this may reflect an underlying lung lesion/neoplasm.  Consider correlation  with tissue sampling.      Additional previously described subcentimeter areas bilateral nodularity as well as reticulonodular opacity have improved.  Stable 1.3 cm indeterminate subpleural nodule lingula.      Persistent unchanged mediastinal adenopathy.          ASSESSMENT AND PLAN:     Left upper lobe masslike opacity  Positive MILAGROS  Positive rheumatoid factor    - Symptoms started early April with fevers, night sweats and fatigue.  Blood work showed elevated CRP of 59 that improved down to 5.0.  Checks x-ray also showed a consolidation/mass.  He was given Levaquin  - CT chest now showing a 5 cm masslike consolidation in the left upper lobe and mediastinal adenopathy.  He is following with pulmonology.  They plan on doing a bronchoscopy this Friday  - Blood work also showed a positive MILAGROS low titer 1: 40.  Negative CECE panel.  C4 was low.  He has no symptoms consistent with connective tissue disease but will order further blood work  - Blood work also showed a positive RF of 52.  He has no swelling or synovitis on exam.  Joint exam is more consistent with osteoarthritis.  Again we will obtain further blood work  - Will also obtain blood work to evaluate for vasculitis, sarcoidosis, IgG4 disease due to CT findings    Spent 60 minutes obtaining history, evaluating patient, reviewing labs, discussing treatment options and completing documentation    Thank you for allowing me to participate in this patients care. Pt will be notified of results.  He will have a bronchoscopy with biopsy on Friday with pulmonology.  Follow-up will depend on results    Kimmy Perez MD  5/6/2025  3:29 PM         [1]   Current Outpatient Medications   Medication Sig Dispense Refill    atorvastatin 10 MG Oral Tab Take 1 tablet (10 mg total) by mouth nightly.      pantoprazole 40 MG Oral Tab EC Take 1 tablet (40 mg total) by mouth 2 (two) times daily before meals. 180 tablet 3    hydroCHLOROthiazide 12.5 MG Oral Tab Take 1 tablet (12.5 mg  total) by mouth daily.      valsartan 160 MG Oral Tab Take 1 tablet (160 mg total) by mouth daily.      pseudoephedrine  MG Oral Tablet 12 Hr Take 1 tablet (120 mg total) by mouth every 12 (twelve) hours. 60 tablet 3   [2]   Past Medical History:   Calculus of kidney    Cataract    Esophageal reflux    High blood pressure    High cholesterol    Hyperlipidemia   [3]   Past Surgical History:  Procedure Laterality Date    Cataract      Colonoscopy N/A 01/10/2024    Procedure: COLONOSCOPY;  Surgeon: Bari Madera MD;  Location: [facility] ENDO    Colonoscopy      Removal of kidney stone     [4]   Family History  Problem Relation Age of Onset    Cancer Brother         lung (smoker)    Other (Other) Brother         AAA    Other (Other) Father         AAA   [5]   Social History  Socioeconomic History    Marital status:    Tobacco Use    Smoking status: Never    Smokeless tobacco: Never   Vaping Use    Vaping status: Never Used   Substance and Sexual Activity    Alcohol use: Yes     Alcohol/week: 3.0 standard drinks of alcohol     Types: 3 Standard drinks or equivalent per week     Comment: socially    Drug use: No   Other Topics Concern    Caffeine Concern Yes     Comment: coffee-2 cups/day     Social Drivers of Health     Food Insecurity: No Food Insecurity (2/12/2025)    Received from [facility]    Hunger Vital Sign     Worried About Running Out of Food in the Last Year: Never true     Ran Out of Food in the Last Year: Never true   Transportation Needs: No Transportation Needs (2/12/2025)    Received from [facility]    PRAPARE - Transportation     Lack of Transportation (Medical): No     Lack of Transportation (Non-Medical): No   Housing Stability: Unknown (2/12/2025)    Received from [facility]    Housing Stability Vital Sign     Unable to Pay for Housing in the Last Year: No

## 2025-05-06 NOTE — PATIENT INSTRUCTIONS
You were seen today for a positive MILAGROS, rheumatoid factor  Having a positive MILAGROS is typically seen with lupus you do not have those symptoms  Having a positive moderate factor is typically seen with rheumatoid arthritis but again you do not have typical symptoms of RA which is inflammation and swelling of the joints  Rheumatoid arthritis can affect the lungs causing interstitial lung disease.  CT did not mention any fibrosis or interstitial lung disease  Lets get further blood work  X-rays of the hands  Lets see what the bronchoscopy biopsy results are going to show

## 2025-05-07 ENCOUNTER — HOSPITAL ENCOUNTER (OUTPATIENT)
Dept: CT IMAGING | Facility: HOSPITAL | Age: 70
Discharge: HOME | End: 2025-05-07
Attending: INTERNAL MEDICINE
Payer: MEDICARE

## 2025-05-07 DIAGNOSIS — R91.1 LUNG NODULE: ICD-10-CM

## 2025-05-07 PROCEDURE — 76497 UNLISTED CT PROCEDURE: CPT | Performed by: INTERNAL MEDICINE

## 2025-05-07 RX ORDER — GINKGO BILOBA LEAF EXTRACT 60 MG
CAPSULE ORAL DAILY
COMMUNITY

## 2025-05-08 ENCOUNTER — HOSPITAL ENCOUNTER (OUTPATIENT)
Dept: GENERAL RADIOLOGY | Age: 70
Discharge: HOME | End: 2025-05-08
Attending: INTERNAL MEDICINE
Payer: MEDICARE

## 2025-05-08 ENCOUNTER — LAB ENCOUNTER (OUTPATIENT)
Dept: LAB | Age: 70
End: 2025-05-08
Attending: INTERNAL MEDICINE
Payer: MEDICARE

## 2025-05-08 DIAGNOSIS — M25.50 POLYARTHRALGIA: ICD-10-CM

## 2025-05-08 DIAGNOSIS — R76.8 POSITIVE ANA (ANTINUCLEAR ANTIBODY): ICD-10-CM

## 2025-05-08 LAB
ALBUMIN SERPL-MCNC: 4.1 G/DL (ref 3.2–4.8)
ALBUMIN/GLOB SERPL: 1.9 {RATIO} (ref 1–2)
ALP LIVER SERPL-CCNC: 96 U/L (ref 45–117)
ALT SERPL-CCNC: 16 U/L (ref 10–49)
ANION GAP SERPL CALC-SCNC: 6 MMOL/L (ref 0–18)
AST SERPL-CCNC: 18 U/L (ref ?–34)
BASOPHILS # BLD AUTO: 0.03 X10(3) UL (ref 0–0.2)
BASOPHILS NFR BLD AUTO: 0.6 %
BILIRUB SERPL-MCNC: 2.1 MG/DL (ref 0.2–1.1)
BUN BLD-MCNC: 14 MG/DL (ref 9–23)
BUN/CREAT SERPL: 16.3 (ref 10–20)
CALCIUM BLD-MCNC: 9.5 MG/DL (ref 8.7–10.4)
CHLORIDE SERPL-SCNC: 104 MMOL/L (ref 98–112)
CO2 SERPL-SCNC: 29 MMOL/L (ref 21–32)
CREAT BLD-MCNC: 0.86 MG/DL (ref 0.7–1.3)
CRP SERPL-MCNC: <0.4 MG/DL (ref ?–1)
DEPRECATED RDW RBC AUTO: 45 FL (ref 35.1–46.3)
EGFRCR SERPLBLD CKD-EPI 2021: 93 ML/MIN/1.73M2 (ref 60–?)
EOSINOPHIL # BLD AUTO: 0.13 X10(3) UL (ref 0–0.7)
EOSINOPHIL NFR BLD AUTO: 2.5 %
ERYTHROCYTE [DISTWIDTH] IN BLOOD BY AUTOMATED COUNT: 12.9 % (ref 11–15)
ERYTHROCYTE [SEDIMENTATION RATE] IN BLOOD: 14 MM/HR (ref 0–20)
FASTING STATUS PATIENT QL REPORTED: NO
GLOBULIN PLAS-MCNC: 2.2 G/DL (ref 2–3.5)
GLUCOSE BLD-MCNC: 135 MG/DL (ref 70–99)
HCT VFR BLD AUTO: 44.4 % (ref 39–53)
HGB BLD-MCNC: 14.9 G/DL (ref 13–17.5)
IMM GRANULOCYTES # BLD AUTO: 0.01 X10(3) UL (ref 0–1)
IMM GRANULOCYTES NFR BLD: 0.2 %
LYMPHOCYTES # BLD AUTO: 1.78 X10(3) UL (ref 1–4)
LYMPHOCYTES NFR BLD AUTO: 34.1 %
MCH RBC QN AUTO: 31.6 PG (ref 26–34)
MCHC RBC AUTO-ENTMCNC: 33.6 G/DL (ref 31–37)
MCV RBC AUTO: 94.1 FL (ref 80–100)
MONOCYTES # BLD AUTO: 0.34 X10(3) UL (ref 0.1–1)
MONOCYTES NFR BLD AUTO: 6.5 %
NEUTROPHILS # BLD AUTO: 2.93 X10 (3) UL (ref 1.5–7.7)
NEUTROPHILS # BLD AUTO: 2.93 X10(3) UL (ref 1.5–7.7)
NEUTROPHILS NFR BLD AUTO: 56.1 %
OSMOLALITY SERPL CALC.SUM OF ELEC: 291 MOSM/KG (ref 275–295)
PLATELET # BLD AUTO: 213 10(3)UL (ref 150–450)
POTASSIUM SERPL-SCNC: 3.9 MMOL/L (ref 3.5–5.1)
PROT SERPL-MCNC: 6.3 G/DL (ref 5.7–8.2)
RBC # BLD AUTO: 4.72 X10(6)UL (ref 3.8–5.8)
RHEUMATOID FACT SERPL-ACNC: 71 IU/ML (ref ?–14)
SODIUM SERPL-SCNC: 139 MMOL/L (ref 136–145)
WBC # BLD AUTO: 5.2 X10(3) UL (ref 4–11)

## 2025-05-08 PROCEDURE — 82787 IGG 1 2 3 OR 4 EACH: CPT

## 2025-05-08 PROCEDURE — 85652 RBC SED RATE AUTOMATED: CPT | Performed by: INTERNAL MEDICINE

## 2025-05-08 PROCEDURE — 86140 C-REACTIVE PROTEIN: CPT | Performed by: INTERNAL MEDICINE

## 2025-05-08 PROCEDURE — 86039 ANTINUCLEAR ANTIBODIES (ANA): CPT | Performed by: INTERNAL MEDICINE

## 2025-05-08 PROCEDURE — 86037 ANCA TITER EACH ANTIBODY: CPT

## 2025-05-08 PROCEDURE — 86225 DNA ANTIBODY NATIVE: CPT | Performed by: INTERNAL MEDICINE

## 2025-05-08 PROCEDURE — 83516 IMMUNOASSAY NONANTIBODY: CPT

## 2025-05-08 PROCEDURE — 86038 ANTINUCLEAR ANTIBODIES: CPT | Performed by: INTERNAL MEDICINE

## 2025-05-08 PROCEDURE — 73130 X-RAY EXAM OF HAND: CPT | Performed by: INTERNAL MEDICINE

## 2025-05-08 PROCEDURE — 80053 COMPREHEN METABOLIC PANEL: CPT

## 2025-05-08 PROCEDURE — 85025 COMPLETE CBC W/AUTO DIFF WBC: CPT | Performed by: INTERNAL MEDICINE

## 2025-05-08 PROCEDURE — 86431 RHEUMATOID FACTOR QUANT: CPT | Performed by: INTERNAL MEDICINE

## 2025-05-08 PROCEDURE — 36415 COLL VENOUS BLD VENIPUNCTURE: CPT

## 2025-05-08 PROCEDURE — 86200 CCP ANTIBODY: CPT | Performed by: INTERNAL MEDICINE

## 2025-05-08 PROCEDURE — 82164 ANGIOTENSIN I ENZYME TEST: CPT

## 2025-05-09 ENCOUNTER — HOSPITAL ENCOUNTER (OUTPATIENT)
Facility: HOSPITAL | Age: 70
Setting detail: HOSPITAL OUTPATIENT SURGERY
Discharge: HOME | End: 2025-05-09
Attending: INTERNAL MEDICINE | Admitting: INTERNAL MEDICINE
Payer: MEDICARE

## 2025-05-09 ENCOUNTER — APPOINTMENT (OUTPATIENT)
Dept: GENERAL RADIOLOGY | Facility: HOSPITAL | Age: 70
End: 2025-05-09
Attending: INTERNAL MEDICINE
Payer: MEDICARE

## 2025-05-09 ENCOUNTER — ANESTHESIA EVENT (OUTPATIENT)
Dept: ENDOSCOPY | Facility: HOSPITAL | Age: 70
End: 2025-05-09
Payer: MEDICARE

## 2025-05-09 ENCOUNTER — ANESTHESIA (OUTPATIENT)
Dept: ENDOSCOPY | Facility: HOSPITAL | Age: 70
End: 2025-05-09
Payer: MEDICARE

## 2025-05-09 VITALS
OXYGEN SATURATION: 98 % | DIASTOLIC BLOOD PRESSURE: 94 MMHG | HEART RATE: 75 BPM | RESPIRATION RATE: 15 BRPM | HEIGHT: 72 IN | TEMPERATURE: 98 F | SYSTOLIC BLOOD PRESSURE: 138 MMHG | WEIGHT: 180 LBS | BODY MASS INDEX: 24.38 KG/M2

## 2025-05-09 DIAGNOSIS — R91.1 LUNG NODULE: ICD-10-CM

## 2025-05-09 PROBLEM — R91.8 MASS OF LEFT LUNG: Status: ACTIVE | Noted: 2025-05-09

## 2025-05-09 LAB
ACE: 38 U/L
BASOPHILS NFR BRONCH: 0 %
EOSINOPHIL NFR BRONCH: 1 %
IGG SUBCLASS 1: 382 MG/DL
IGG SUBCLASS 2: 223 MG/DL
IGG SUBCLASS 3: 37 MG/DL
IGG SUBCLASS 4: 37 MG/DL
LYMPHOCYTES NFR BRONCH: 21 %
MONOS+MACROS NFR BRONCH: 64 %
NEUTROPHILS NFR BRONCH: 14 %
RBC # FLD: 7361 /CUMM (ref ?–1)
TOTAL CELLS COUNTED BRONCH: 226 /CUMM (ref ?–1)
TOTAL CELLS COUNTED FLD: 100
WBC CALC (IRIS) BRW: 226 /CUMM

## 2025-05-09 PROCEDURE — 76000 FLUOROSCOPY <1 HR PHYS/QHP: CPT | Performed by: INTERNAL MEDICINE

## 2025-05-09 PROCEDURE — 31624 DX BRONCHOSCOPE/LAVAGE: CPT | Performed by: INTERNAL MEDICINE

## 2025-05-09 PROCEDURE — 31627 NAVIGATIONAL BRONCHOSCOPY: CPT | Performed by: INTERNAL MEDICINE

## 2025-05-09 PROCEDURE — 71045 X-RAY EXAM CHEST 1 VIEW: CPT | Performed by: INTERNAL MEDICINE

## 2025-05-09 PROCEDURE — 31654 BRONCH EBUS IVNTJ PERPH LES: CPT | Performed by: INTERNAL MEDICINE

## 2025-05-09 PROCEDURE — 31629 BRONCHOSCOPY/NEEDLE BX EACH: CPT | Performed by: INTERNAL MEDICINE

## 2025-05-09 PROCEDURE — 31628 BRONCHOSCOPY/LUNG BX EACH: CPT | Performed by: INTERNAL MEDICINE

## 2025-05-09 RX ORDER — SODIUM CHLORIDE, SODIUM LACTATE, POTASSIUM CHLORIDE, CALCIUM CHLORIDE 600; 310; 30; 20 MG/100ML; MG/100ML; MG/100ML; MG/100ML
INJECTION, SOLUTION INTRAVENOUS CONTINUOUS
Status: DISCONTINUED | OUTPATIENT
Start: 2025-05-09 | End: 2025-05-09 | Stop reason: HOSPADM

## 2025-05-09 RX ORDER — MORPHINE SULFATE 4 MG/ML
4 INJECTION, SOLUTION INTRAMUSCULAR; INTRAVENOUS EVERY 10 MIN PRN
Status: DISCONTINUED | OUTPATIENT
Start: 2025-05-09 | End: 2025-05-09 | Stop reason: HOSPADM

## 2025-05-09 RX ORDER — DEXAMETHASONE SODIUM PHOSPHATE 4 MG/ML
VIAL (ML) INJECTION AS NEEDED
Status: DISCONTINUED | OUTPATIENT
Start: 2025-05-09 | End: 2025-05-09 | Stop reason: SURG

## 2025-05-09 RX ORDER — IPRATROPIUM BROMIDE AND ALBUTEROL SULFATE 2.5; .5 MG/3ML; MG/3ML
3 SOLUTION RESPIRATORY (INHALATION) ONCE
Status: DISCONTINUED | OUTPATIENT
Start: 2025-05-09 | End: 2025-05-09 | Stop reason: HOSPADM

## 2025-05-09 RX ORDER — HYDROMORPHONE HYDROCHLORIDE 1 MG/ML
0.6 INJECTION, SOLUTION INTRAMUSCULAR; INTRAVENOUS; SUBCUTANEOUS EVERY 5 MIN PRN
Status: DISCONTINUED | OUTPATIENT
Start: 2025-05-09 | End: 2025-05-09 | Stop reason: HOSPADM

## 2025-05-09 RX ORDER — MORPHINE SULFATE 4 MG/ML
2 INJECTION, SOLUTION INTRAMUSCULAR; INTRAVENOUS EVERY 10 MIN PRN
Status: DISCONTINUED | OUTPATIENT
Start: 2025-05-09 | End: 2025-05-09 | Stop reason: HOSPADM

## 2025-05-09 RX ORDER — SODIUM CHLORIDE, SODIUM LACTATE, POTASSIUM CHLORIDE, CALCIUM CHLORIDE 600; 310; 30; 20 MG/100ML; MG/100ML; MG/100ML; MG/100ML
INJECTION, SOLUTION INTRAVENOUS CONTINUOUS
Status: DISCONTINUED | OUTPATIENT
Start: 2025-05-09 | End: 2025-05-09

## 2025-05-09 RX ORDER — HYDROMORPHONE HYDROCHLORIDE 1 MG/ML
0.4 INJECTION, SOLUTION INTRAMUSCULAR; INTRAVENOUS; SUBCUTANEOUS EVERY 5 MIN PRN
Status: DISCONTINUED | OUTPATIENT
Start: 2025-05-09 | End: 2025-05-09 | Stop reason: HOSPADM

## 2025-05-09 RX ORDER — ROCURONIUM BROMIDE 10 MG/ML
INJECTION, SOLUTION INTRAVENOUS AS NEEDED
Status: DISCONTINUED | OUTPATIENT
Start: 2025-05-09 | End: 2025-05-09 | Stop reason: SURG

## 2025-05-09 RX ORDER — HYDROMORPHONE HYDROCHLORIDE 1 MG/ML
0.2 INJECTION, SOLUTION INTRAMUSCULAR; INTRAVENOUS; SUBCUTANEOUS EVERY 5 MIN PRN
Status: DISCONTINUED | OUTPATIENT
Start: 2025-05-09 | End: 2025-05-09 | Stop reason: HOSPADM

## 2025-05-09 RX ORDER — ONDANSETRON 2 MG/ML
INJECTION INTRAMUSCULAR; INTRAVENOUS AS NEEDED
Status: DISCONTINUED | OUTPATIENT
Start: 2025-05-09 | End: 2025-05-09 | Stop reason: SURG

## 2025-05-09 RX ORDER — NALOXONE HYDROCHLORIDE 0.4 MG/ML
0.08 INJECTION, SOLUTION INTRAMUSCULAR; INTRAVENOUS; SUBCUTANEOUS AS NEEDED
Status: DISCONTINUED | OUTPATIENT
Start: 2025-05-09 | End: 2025-05-09 | Stop reason: HOSPADM

## 2025-05-09 RX ORDER — ASPIRIN 81 MG/1
TABLET, CHEWABLE ORAL DAILY
COMMUNITY

## 2025-05-09 RX ORDER — IPRATROPIUM BROMIDE AND ALBUTEROL SULFATE 2.5; .5 MG/3ML; MG/3ML
3 SOLUTION RESPIRATORY (INHALATION) ONCE
Status: COMPLETED | OUTPATIENT
Start: 2025-05-09 | End: 2025-05-09

## 2025-05-09 RX ORDER — MORPHINE SULFATE 10 MG/ML
6 INJECTION, SOLUTION INTRAMUSCULAR; INTRAVENOUS EVERY 10 MIN PRN
Status: DISCONTINUED | OUTPATIENT
Start: 2025-05-09 | End: 2025-05-09 | Stop reason: HOSPADM

## 2025-05-09 RX ORDER — LIDOCAINE HYDROCHLORIDE 10 MG/ML
INJECTION, SOLUTION EPIDURAL; INFILTRATION; INTRACAUDAL; PERINEURAL AS NEEDED
Status: DISCONTINUED | OUTPATIENT
Start: 2025-05-09 | End: 2025-05-09 | Stop reason: SURG

## 2025-05-09 RX ADMIN — ROCURONIUM BROMIDE 50 MG: 10 INJECTION, SOLUTION INTRAVENOUS at 13:40:00

## 2025-05-09 RX ADMIN — LIDOCAINE HYDROCHLORIDE 50 MG: 10 INJECTION, SOLUTION EPIDURAL; INFILTRATION; INTRACAUDAL; PERINEURAL at 13:40:00

## 2025-05-09 RX ADMIN — DEXAMETHASONE SODIUM PHOSPHATE 4 MG: 4 MG/ML VIAL (ML) INJECTION at 13:40:00

## 2025-05-09 RX ADMIN — ONDANSETRON 4 MG: 2 INJECTION INTRAMUSCULAR; INTRAVENOUS at 13:40:00

## 2025-05-09 NOTE — ANESTHESIA PROCEDURE NOTES
Airway  Date/Time: 5/9/2025 1:42 PM  Reason: Elective    Airway not difficult    General Information and Staff   Patient location during procedure: OR  Anesthesiologist: Flaca Mcleod MD  Performed: anesthesiologist   Performed by: Flaca Mcleod MD  Authorized by: Flaca Mcleod MD        Indications and Patient Condition  Indications for airway management: anesthesia  Sedation level: deep      Preoxygenated: yesPatient position: sniffing    Mask difficulty assessment: 1 - vent by mask    Final Airway Details    Final airway type: endotracheal airway    Successful airway: ETT  Cuffed: yes   Successful intubation technique: Video laryngoscopy  Facilitating devices/methods: intubating stylet  Endotracheal tube insertion site: oral  ETT size (mm): 8.0    Cormack-Lehane Classification: grade I - full view of glottis  Placement verified by: capnometry   Measured from: teeth  Number of attempts at approach: 1

## 2025-05-09 NOTE — ANESTHESIA PREPROCEDURE EVALUATION
Anesthesia PreOp Note    HPI:     Han Rangel is a 69 year old male who presents for preoperative consultation requested by: Brennen Bravo DO    Date of Surgery: 5/9/2025    Procedure(s):  ROBOT-ASSISTED NAVIGATIONAL BRONCHOSCOPY  ENDOBRONCHIAL ULTRASOUND (EBUS)  Indication: Lung nodule    Relevant Problems   No relevant active problems       NPO:  Last Liquid Consumption Date: 05/09/25  Last Liquid Consumption Time: 0700  Last Solid Consumption Date: 05/08/25  Last Solid Consumption Time: 1900  Last Liquid Consumption Date: 05/09/25          History Review:  Patient Active Problem List    Diagnosis Date Noted    Hypertension 07/12/2023    Gastroesophageal reflux disease 01/25/2017    Spinal stenosis of lumbar region 02/06/2014    Lumbosacral spondylosis 02/06/2014    Low back pain 01/31/2014    Sacroiliitis 01/31/2014    Pure hypercholesterolemia 01/06/2014       Past Medical History[1]    Past Surgical History[2]    Prescriptions Prior to Admission[3]  Current Medications and Prescriptions Ordered in Epic[4]    Allergies[5]    Family History[6]  Social Hx on file[7]    Available pre-op labs reviewed.  Lab Results   Component Value Date    WBC 5.2 05/08/2025    RBC 4.72 05/08/2025    HGB 14.9 05/08/2025    HCT 44.4 05/08/2025    MCV 94.1 05/08/2025    MCH 31.6 05/08/2025    MCHC 33.6 05/08/2025    RDW 12.9 05/08/2025    .0 05/08/2025     Lab Results   Component Value Date     05/08/2025    K 3.9 05/08/2025     05/08/2025    CO2 29.0 05/08/2025    BUN 14 05/08/2025    CREATSERUM 0.86 05/08/2025     (H) 05/08/2025    CA 9.5 05/08/2025          Vital Signs:  Body mass index is 24.41 kg/m².   height is 1.829 m (6') and weight is 81.6 kg (180 lb). His oral temperature is 98 °F (36.7 °C). His blood pressure is 140/84 and his pulse is 74. His respiration is 15 and oxygen saturation is 98%.   Vitals:    05/07/25 1623 05/09/25 1216   BP:  140/84   Pulse:  74   Resp:  15   Temp:  98  °F (36.7 °C)   TempSrc:  Oral   SpO2:  98%   Weight: 81.6 kg (180 lb)    Height: 1.829 m (6')         Anesthesia Evaluation     Patient summary reviewed and Nursing notes reviewed    No history of anesthetic complications   Airway   Mallampati: I  TM distance: >3 FB  Neck ROM: full  Dental      Pulmonary - negative ROS and normal exam   Cardiovascular - normal exam  (+) hypertension    ROS comment: 1. Normal SPECT myocardial perfusion imaging.  2. Normal ECG response to exercise.  3. Normal left ventricular systolic function. The calculated left ejection  fraction is 60% at rest and 56% at stress.  4. Above Average functional capacity, with an estimated workload of 9.5  mets.  5. Normal blood pressure response to exercise.       Neuro/Psych      GI/Hepatic/Renal    (+) GERD    Endo/Other    Abdominal                  Anesthesia Plan:   ASA:  3  Plan:   General  Informed Consent Plan and Risks Discussed With:  Patient      I have informed Han Rangel and/or legal guardian or family member of the nature of the anesthetic plan, benefits, risks including possible dental damage if relevant, major complications, and any alternative forms of anesthetic management.   All of the patient's questions were answered to the best of my ability. The patient desires the anesthetic management as planned.  Flaca Mcleod MD  5/9/2025 1:00 PM  Present on Admission:  **None**           [1]   Past Medical History:   Calculus of kidney    Cataract    Esophageal reflux    High blood pressure    High cholesterol    Hyperlipidemia   [2]   Past Surgical History:  Procedure Laterality Date    Cataract Bilateral     Colonoscopy N/A 01/10/2024    Procedure: COLONOSCOPY;  Surgeon: Bari Madera MD;  Location: [facility] ENDO    Colonoscopy      Other surgical history Bilateral     retinal detachment surgery    Removal of kidney stone     [3]   Medications Prior to Admission   Medication Sig Dispense Refill Last Dose/Taking    aspirin  81 MG Oral Chew Tab Chew by mouth daily.   5/6/2025    Echinacea 80 MG Oral Cap Take by mouth daily.   5/4/2025    atorvastatin 10 MG Oral Tab Take 1 tablet (10 mg total) by mouth nightly.   5/9/2025    pantoprazole 40 MG Oral Tab EC Take 1 tablet (40 mg total) by mouth 2 (two) times daily before meals. 180 tablet 3 5/9/2025    hydroCHLOROthiazide 12.5 MG Oral Tab Take 1 tablet (12.5 mg total) by mouth in the morning.   5/9/2025    valsartan 160 MG Oral Tab Take 1 tablet (160 mg total) by mouth in the morning.   5/9/2025   [4]   Current Facility-Administered Medications Ordered in Epic   Medication Dose Route Frequency Provider Last Rate Last Admin    lactated ringers infusion   Intravenous Continuous Brennen Bravo DO        ipratropium-albuterol (Duoneb) 0.5-2.5 (3) MG/3ML inhalation solution 3 mL  3 mL Nebulization Once Brennen Bravo DO         No current [facility]-ordered outpatient medications on file.   [5]   Allergies  Allergen Reactions    Penicillins HIVES     Has never been in the hospital for    Lisinopril OTHER (SEE COMMENTS)     cough   [6]   Family History  Problem Relation Age of Onset    Cancer Brother         lung (smoker)    Other (Other) Brother         AAA    Other (Other) Father         AAA   [7]   Social History  Socioeconomic History    Marital status:    Tobacco Use    Smoking status: Never    Smokeless tobacco: Never   Vaping Use    Vaping status: Never Used   Substance and Sexual Activity    Alcohol use: Yes     Alcohol/week: 3.0 standard drinks of alcohol     Types: 3 Standard drinks or equivalent per week     Comment: socially    Drug use: No   Other Topics Concern    Caffeine Concern Yes     Comment: coffee-2 cups/day

## 2025-05-09 NOTE — H&P
[facility]  part of [facility]    History & Physical    JamesLive Rangel Patient Status:  Hospital Outpatient Surgery    1955 MRN [id number]   Location [facility] POST ANESTHESIA CARE UNIT Attending Brennen Bravo DO   Hosp Day # 0 PCP Jw Spring DO     Date of Admission:  2025  History of Present Illness:   Patient is a 69-year-old male who presents today for robotic navigational bronchoscopy and biopsy.  Recent chest imaging with persistent left upper lobe masslike opacity seen.  Denies significant dyspnea symptoms or cough worsening.  Denies fevers or chills.  Completed previous course of antibiotic therapy    Past Medical History  Past Medical History[1]    Past Surgical History  Past Surgical History[2]    Family History  Family History[3]    Social History  Social Hx on file[4]        Current Medications:  Current Hospital Medications[5]  Prescriptions Prior to Admission[6]    Allergies  Allergies[7]    Review of Systems:   Constitutional: denies fevers, chills, weakness, fatigue, recent illness  HEENT: denies headache, sore throat, vision loss  Cardio: denies chest pain, chest pressure, palpitations  Respiratory: denies dyspnea, cough, wheezing, hemoptysis   GI: denies nausea, vomiting, abdominal pain  : denies dysuria, hematuria  Musculoskeletal: denies arthralgia, myalgia  Integumentary: denies rash, itching  Neurological: denies syncope, weakness, dizziness,   Psychiatric: denies depression, anxiety  Hematologic: denies bruising    Physical Exam:   Blood pressure 140/84, pulse 74, temperature 98 °F (36.7 °C), temperature source Oral, resp. rate 15, height 6' (1.829 m), weight 180 lb (81.6 kg), SpO2 98%.    Constitutional: no acute distress  HEENT: PERRL  Neck: neck supple, no JVD  Cardio: RRR, S1 S2  Respiratory: clear to auscultation bilaterally, no wheezing, rales, rhonchi, crackles  GI: abdomen soft, non tender, active bowel sounds, no  organomegaly  Extremities: no clubbing, cyanosis, edema  Neurologic: no gross motor deficits  Skin: warm, dry    Results:   Laboratory Data  Lab Results   Component Value Date    WBC 5.2 05/08/2025    HGB 14.9 05/08/2025    HCT 44.4 05/08/2025    .0 05/08/2025    CREATSERUM 0.86 05/08/2025    BUN 14 05/08/2025     05/08/2025    K 3.9 05/08/2025     05/08/2025    CO2 29.0 05/08/2025     (H) 05/08/2025    CA 9.5 05/08/2025    ALB 4.1 05/08/2025    ALKPHO 96 05/08/2025    TP 6.3 05/08/2025    AST 18 05/08/2025    ALT 16 05/08/2025    TSH 1.510 05/21/2019    PSA 0.2 06/28/2018    ESRML 14 05/08/2025    CRP <0.40 05/08/2025         Imaging  CT CHEST(CONTRAST ONLY) (CPT=71260)  Result Date: 5/5/2025  PROCEDURE: CT CHEST(CONTRAST ONLY) (CPT=71260)  COMPARISON: External Exams, CT CHEST(CONTRAST ONLY) (CPT=71260), 4/04/2025, 1:50 PM.  INDICATIONS: R91.1 Lung nodule  TECHNIQUE: CT images of the chest were obtained with non-ionic intravenous contrast material.  Automated exposure control for dose reduction was used. Adjustment of the mA and/or kV was done based on the patient's size. Use of iterative reconstruction technique for dose reduction was used. Dose information is transmitted to the ACR (American College of Radiology) NRDR (National Radiology Data Registry) which includes the Dose Index Registry.  FINDINGS:  AIRWAYS: Patent central airways. LUNGS/PLEURA: Ovoid masslike consolidation in the posterior left upper lobe abutting the major fissure measures 3.9 x 2.8 cm similar in size to the previous examination.  Previously seen subcentimeter additional nodules and areas of reticulonodular opacity appear approved with residual areas remaining.  There is a 1.3 cm subpleural nodular density in the lingula not significantly changed.  No pleural effusion.  No pneumothorax. MEDIASTINUM/SHANDRA: Enlarged AP window node measures 1.4 cm short axis similar to the prior study. VASCULATURE: Thoracic aorta is  normal in caliber. CARDIAC: No cardiomegaly or pericardial effusion.  There is coronary artery calcification. CHEST WALL: No axillary mass or enlarged adenopathy.  LIMITED ABDOMEN: Hepatic cyst.  Small gallstone. BONES: Spondylosis thoracic spine.  OTHER: Negative.          CONCLUSION:   Posterior left upper lobe masslike consolidation similar in size to the prior outside examination 4/4/25.  Given lack of significant interval change, this may reflect an underlying lung lesion/neoplasm.  Consider correlation with tissue sampling.  Additional previously described subcentimeter areas bilateral nodularity as well as reticulonodular opacity have improved.  Stable 1.3 cm indeterminate subpleural nodule lingula.  Persistent unchanged mediastinal adenopathy.     Dictated by (CST): Red Bowman MD on 5/05/2025 at 6:19 PM     Finalized by (CST): Red Bowman MD on 5/05/2025 at 6:27 PM          XR CHEST PA + LAT CHEST (WWK=28100)  Result Date: 4/21/2025  PROCEDURE: XR CHEST PA + LAT CHEST (CPT=71046)  COMPARISON: External Exams, CT CHEST(CONTRAST ONLY) (CPT=71260), 4/04/2025, 1:50 PM.  [facility], XR CHEST PA + LAT CHEST (CPT=71046), 12/23/2023, 9:50 AM.  INDICATIONS: Follow up lung nodule. Cold sweats and cough for 3 weeks.  Left upper lobe lung consolidation and peripheral parenchymal lesion in the lingular segment.  Additional bibasilar subcentimeter pulmonary nodules on prior outside chest CT from 4/4/2025.  TECHNIQUE:   Two views.   FINDINGS: CARDIAC/VASC: Normal cardiac silhouette. Unremarkable pulmonary vasculature.  MEDIAST/SHANDRA: Atherosclerotic aorta with no visible aneurysm.  LUNGS/PLEURA: Left upper lobe lung consolidation in the paramediastinal region suspicious for pneumonitis.  Follow-up to resolution.  Peripheral pulmonary nodule left perihilar region measuring 2.3 x 1.1 cm lingular segment left upper lobe similar prior chest CT.  Additional bibasilar subcentimeter  pulmonary nodules much less conspicuous on current chest radiographs BONES: Mild scoliosis with mild degenerative disc disease and spondylosis.  OTHER: Negative.          CONCLUSION:  1. Left upper lobe lung consolidation without significant change from 4/4/2025.  Follow-up to interval resolution to exclude underlying endobronchial lesion. 2. Peripheral pulmonary lesion measuring 2.3 x 1.1 cm lingular segment unchanged. 3. Multiple subcentimeter bibasilar pulmonary nodules more conspicuous on prior chest CT.    Dictated by (CST): Justice Garcia MD on 4/21/2025 at 9:45 AM     Finalized by (CST): Justice Garcia MD on 4/21/2025 at 9:52 AM            Assessment   1.  Left upper lobe mass    Plan   -Patient presents today for robotic navigational bronchoscopy and biopsy of left upper lobe masslike opacity.  Also additional subpleural nodule present.    Brennen Bravo DO  Pulmonary Critical Care Medicine  [facility]  5/9/2025  3:06 PM         [1]   Past Medical History:   Calculus of kidney    Cataract    Esophageal reflux    High blood pressure    High cholesterol    Hyperlipidemia   [2]   Past Surgical History:  Procedure Laterality Date    Cataract Bilateral     Colonoscopy N/A 01/10/2024    Procedure: COLONOSCOPY;  Surgeon: Bari Madera MD;  Location: [facility] ENDO    Colonoscopy      Other surgical history Bilateral     retinal detachment surgery    Removal of kidney stone     [3]   Family History  Problem Relation Age of Onset    Cancer Brother         lung (smoker)    Other (Other) Brother         AAA    Other (Other) Father         AAA   [4]   Social History  Socioeconomic History    Marital status:    Tobacco Use    Smoking status: Never    Smokeless tobacco: Never   Vaping Use    Vaping status: Never Used   Substance and Sexual Activity    Alcohol use: Yes     Alcohol/week: 3.0 standard drinks of alcohol     Types: 3 Standard drinks or equivalent per week     Comment: socially     Drug use: No   Other Topics Concern    Caffeine Concern Yes     Comment: coffee-2 cups/day   [5]   Current Facility-Administered Medications   Medication Dose Route Frequency    lactated ringers infusion   Intravenous Continuous    lactated ringers infusion   Intravenous Continuous    lactated ringers IV bolus 500 mL  500 mL Intravenous Once PRN    atropine 0.1 MG/ML injection 0.5 mg  0.5 mg Intravenous PRN    naloxone (Narcan) 0.4 MG/ML injection 0.08 mg  0.08 mg Intravenous PRN    fentaNYL (Sublimaze) 50 mcg/mL injection 25 mcg  25 mcg Intravenous Q5 Min PRN    fentaNYL (Sublimaze) 50 mcg/mL injection 50 mcg  50 mcg Intravenous Q5 Min PRN    HYDROmorphone (Dilaudid) 1 MG/ML injection 0.2 mg  0.2 mg Intravenous Q5 Min PRN    HYDROmorphone (Dilaudid) 1 MG/ML injection 0.4 mg  0.4 mg Intravenous Q5 Min PRN    HYDROmorphone (Dilaudid) 1 MG/ML injection 0.6 mg  0.6 mg Intravenous Q5 Min PRN    morphINE PF 4 MG/ML injection 2 mg  2 mg Intravenous Q10 Min PRN    morphINE PF 4 MG/ML injection 4 mg  4 mg Intravenous Q10 Min PRN    morphINE PF 10 MG/ML injection 6 mg  6 mg Intravenous Q10 Min PRN   [6]   Medications Prior to Admission   Medication Sig    aspirin 81 MG Oral Chew Tab Chew by mouth daily.    Echinacea 80 MG Oral Cap Take by mouth daily.    atorvastatin 10 MG Oral Tab Take 1 tablet (10 mg total) by mouth nightly.    pantoprazole 40 MG Oral Tab EC Take 1 tablet (40 mg total) by mouth 2 (two) times daily before meals.    hydroCHLOROthiazide 12.5 MG Oral Tab Take 1 tablet (12.5 mg total) by mouth in the morning.    valsartan 160 MG Oral Tab Take 1 tablet (160 mg total) by mouth in the morning.   [7]   Allergies  Allergen Reactions    Penicillins HIVES     Has never been in the hospital for    Lisinopril OTHER (SEE COMMENTS)     cough

## 2025-05-09 NOTE — PROCEDURES
Robotic navigational bronchoscopy with transbronchial needle aspiration, transbronchial biopsy and bronchoalveolar lavage of left upper lobe mass with radial probe endobronchial ultrasound and fluoroscopic guidance and linear endobronchial ultrasound inspection of mediastinal and hilar lymph node stations    Preoperative diagnosis: Left upper lobe mass    Postoperative diagnosis: Left upper lobe mass    Anesthesia: General    Consent: Risks and benefits were reviewed with the patient in detail regarding the procedure as well as anesthesia prior to the procedure.  All questions were answered.    Procedure:  Prior to procedure Ion protocol CT chest was loaded on Intuitive Ion Planpoint software.  Target was marked and measured.  Visual pathways were created to the lesion.  The information was stored to her USB drive and loaded on the Ion controller software.    After patient intubated video bronchoscope advanced through endotracheal tube and lower trachea visualized with appeared grossly normal in appearance. Main lisa was sharp and mobile.  The bronchoscope was advanced into the right mainstem bronchus and the right upper, middle and lower lobe segmental and subsegmental anatomy was visualized with appeared normal in appearance without evidence of any lesions, inflammatory changes or significant secretions.  The bronchoscope was next advanced into the left mainstem bronchus and the left upper, lingular and lower lobe segmental and subsegmental anatomy was visualized without any gross abnormalities seen without evidence of any lesions, inflammatory changes or significant secretions.      The robotic IM catheter was introduced via the swivel adapter into the endotracheal tube.  Registration was performed and confirmed with acceptable divergence.  Using shaped sensing robotic catheter guidance, the left upper lobe mass was located.  Subsequently, radial probe ultrasound was introduced through the robotic catheter  confirming appropriate positioning with concentric view.  Using fluoroscopic guidance, transbronchial needle aspiration, transbronchial biopsies and BAL were performed.  Passes were given to the cytopathologist for screening.  No evidence of significant bleeding appreciated.  Attempted to navigate catheter to lingular nodule.  Radial ultrasound image did not reveal evidence of eccentric or concentric view.  Decision made to hold off biopsies.  Robotic catheter was withdrawn.    The robotic bronchoscope was removed and EBUS bronchoscope advanced through ET tube.  Under direct ultrasound guidance, mediastinal and hilar lymph nodes were inspected with no other significant lymphadenopathy noted.  All the airways were inspected once before with no evidence of bleeding and bronchoscope was removed and procedure completed.  All samples sent for analysis.  Saturations remained stable throughout the procedure.    Immediate blood loss: <5 ml      Brennen Bravo DO  Pulmonary Critical Care Medicine  [facility]

## 2025-05-09 NOTE — ANESTHESIA POSTPROCEDURE EVALUATION
Patient: Han Rangel    Procedure Summary       Date: 05/09/25 Room / Location: [facility] ENDOSCOPY 05 / [facility] ENDOSCOPY    Anesthesia Start: 1338 Anesthesia Stop: 1458    Procedures:       ROBOT-ASSISTED NAVIGATIONAL BRONCHOSCOPY with transbronchial needle aspirations, transbronchial biopsies and bronchoalveolar lavage (Left)      ENDOBRONCHIAL ULTRASOUND (EBUS) Diagnosis:       Lung nodule      (Lung nodule)    Surgeons: Brennen Bravo DO Anesthesiologist: Flaca Mcleod MD    Anesthesia Type: general ASA Status: 3            Anesthesia Type: general    Vitals Value Taken Time   /86 05/09/25 15:17   Temp  05/09/25 15:24   Pulse 71 05/09/25 15:21   Resp 14 05/09/25 15:21   SpO2 97 % 05/09/25 15:21   Vitals shown include unfiled device data.    [facility] AN Post Evaluation:   Patient Evaluated in PACU  Patient Participation: complete - patient participated  Level of Consciousness: awake  Pain Management: adequate  Airway Patency:patent  Yes    Nausea/Vomiting: none  Cardiovascular Status: acceptable  Respiratory Status: acceptable  Postoperative Hydration acceptable      Flaca Mcleod MD  5/9/2025 3:24 PM

## 2025-05-09 NOTE — DISCHARGE INSTRUCTIONS
Home Care Instructions Following Bronchoscopy / Endobronchial Ultrasound with Sedation    Diet:  Prior to your examination, a local anesthetic was used to numb the back of your throat. Do not eat or drink for two hours. Your nurse will instruct you with the time you may resume your diet.  Sip fluids initially and advance to your regular diet as tolerated.  Do not drink alcohol today.    Medication:  If you have questions about resuming your normal medications, please contact your Primary Care Physician.    Activities:  Do not drive today.  Do not operate any machinery today (including kitchen equipment).    What to Expect:  A sore throat  A cough  Hoarseness  A small amount of blood in your sputum    Contact Your Doctor If:  You have difficulty breathing  You have chest pain  You have a fever greater than 102°F  You cough up more than a few tablespoons of blood in your sputum    **If unable to reach your doctor, please go to the [facility] Emergency Room**    - Your referring physician will receive a full report of your examination.  - Please contact your physician’s office within one week for results if appointment not scheduled.    You may be able to see your laboratory results in [employer] between 4 and 7 business days.  In some cases, your physician may not have viewed the results before they are released to [employer].  If you have questions regarding your results contact the physician who ordered the test/exam by phone or via [employer] by choosing \"Ask a Medical Question.\"

## 2025-05-12 LAB
ANTI-MPO ANTIBODIES: <0.2 UNITS
ANTI-PR3 ANTIBODIES: <0.2 UNITS
NON GYNE INTERPRETATION: NEGATIVE

## 2025-05-13 LAB
ANA NUCLEOLAR TITR SER IF: 160 {TITER}
DSDNA AB TITR SER: <10 {TITER}
NUCLEAR IGG TITR SER IF: POSITIVE {TITER}

## 2025-05-14 ENCOUNTER — TELEPHONE (OUTPATIENT)
Dept: CARDIOLOGY | Age: 70
End: 2025-05-14

## 2025-05-14 ENCOUNTER — E-ADVICE (OUTPATIENT)
Dept: CARDIOLOGY | Age: 70
End: 2025-05-14

## 2025-05-14 LAB — ASPERGILLUS AG BAL/SERUM: 0.22 INDEX

## 2025-05-15 LAB
CCP IGG SERPL-ACNC: 1.1 U/ML (ref 0–6.9)
CENTROMERE IGG SER-ACNC: 0.6 U/ML (ref ?–7)
ENA JO1 AB SER IA-ACNC: <0.3 U/ML (ref ?–7)
ENA RNP IGG SER IA-ACNC: 0.7 U/ML (ref ?–7)
ENA SCL70 IGG SER IA-ACNC: 1 U/ML (ref ?–7)
ENA SM IGG SER IA-ACNC: <0.7 U/ML (ref ?–7)
ENA SS-A IGG SER IA-ACNC: 0.6 U/ML (ref ?–7)
ENA SS-B IGG SER IA-ACNC: 0.6 U/ML (ref ?–7)
U1 SNRNP IGG SER IA-ACNC: 2.2 U/ML (ref ?–5)

## 2025-05-19 DIAGNOSIS — R93.1 ELEVATED CORONARY ARTERY CALCIUM SCORE: ICD-10-CM

## 2025-05-19 DIAGNOSIS — R06.09 OTHER FORM OF DYSPNEA: ICD-10-CM

## 2025-05-19 DIAGNOSIS — I10 ESSENTIAL HYPERTENSION: Primary | ICD-10-CM

## 2025-05-19 RX ORDER — METOPROLOL TARTRATE 25 MG/1
25 TABLET, FILM COATED ORAL DAILY
COMMUNITY

## 2025-05-20 ENCOUNTER — LAB SERVICES (OUTPATIENT)
Dept: LAB | Age: 70
End: 2025-05-20

## 2025-05-20 ENCOUNTER — RESULTS FOLLOW-UP (OUTPATIENT)
Dept: CARDIOLOGY | Age: 70
End: 2025-05-20

## 2025-05-20 DIAGNOSIS — E78.00 PURE HYPERCHOLESTEROLEMIA: ICD-10-CM

## 2025-05-20 DIAGNOSIS — R00.2 PALPITATION: ICD-10-CM

## 2025-05-20 DIAGNOSIS — I71.21 ANEURYSM OF ASCENDING AORTA WITHOUT RUPTURE (CMD): ICD-10-CM

## 2025-05-20 DIAGNOSIS — I10 ESSENTIAL HYPERTENSION: ICD-10-CM

## 2025-05-20 DIAGNOSIS — R06.09 OTHER FORM OF DYSPNEA: ICD-10-CM

## 2025-05-20 DIAGNOSIS — R93.1 ELEVATED CORONARY ARTERY CALCIUM SCORE: ICD-10-CM

## 2025-05-20 LAB
ANION GAP SERPL CALC-SCNC: 8 MMOL/L (ref 7–19)
BUN SERPL-MCNC: 20 MG/DL (ref 6–20)
BUN/CREAT SERPL: 22 (ref 7–25)
CALCIUM SERPL-MCNC: 8.9 MG/DL (ref 8.4–10.2)
CHLORIDE SERPL-SCNC: 109 MMOL/L (ref 97–110)
CO2 SERPL-SCNC: 29 MMOL/L (ref 21–32)
CREAT SERPL-MCNC: 0.89 MG/DL (ref 0.67–1.17)
EGFRCR SERPLBLD CKD-EPI 2021: >90 ML/MIN/{1.73_M2}
FASTING DURATION TIME PATIENT: ABNORMAL H
GLUCOSE SERPL-MCNC: 106 MG/DL (ref 70–99)
NT-PROBNP SERPL-MCNC: 906 PG/ML
POTASSIUM SERPL-SCNC: 3.7 MMOL/L (ref 3.4–5.1)
SODIUM SERPL-SCNC: 142 MMOL/L (ref 135–145)

## 2025-05-20 PROCEDURE — 83880 ASSAY OF NATRIURETIC PEPTIDE: CPT | Performed by: CLINICAL MEDICAL LABORATORY

## 2025-05-20 PROCEDURE — 80048 BASIC METABOLIC PNL TOTAL CA: CPT | Performed by: CLINICAL MEDICAL LABORATORY

## 2025-05-20 PROCEDURE — 36415 COLL VENOUS BLD VENIPUNCTURE: CPT | Performed by: CLINICAL MEDICAL LABORATORY

## 2025-05-20 PROCEDURE — 80061 LIPID PANEL: CPT | Performed by: CLINICAL MEDICAL LABORATORY

## 2025-05-21 ENCOUNTER — ANCILLARY PROCEDURE (OUTPATIENT)
Dept: CARDIOLOGY | Age: 70
End: 2025-05-21
Attending: INTERNAL MEDICINE

## 2025-05-21 ENCOUNTER — APPOINTMENT (OUTPATIENT)
Dept: CARDIOLOGY | Age: 70
End: 2025-05-21

## 2025-05-21 VITALS
HEIGHT: 72 IN | WEIGHT: 188.16 LBS | DIASTOLIC BLOOD PRESSURE: 76 MMHG | SYSTOLIC BLOOD PRESSURE: 122 MMHG | OXYGEN SATURATION: 96 % | BODY MASS INDEX: 25.49 KG/M2 | HEART RATE: 72 BPM

## 2025-05-21 DIAGNOSIS — E78.00 PURE HYPERCHOLESTEROLEMIA: ICD-10-CM

## 2025-05-21 DIAGNOSIS — R00.2 PALPITATION: ICD-10-CM

## 2025-05-21 DIAGNOSIS — R06.09 OTHER FORM OF DYSPNEA: ICD-10-CM

## 2025-05-21 DIAGNOSIS — R93.1 ELEVATED CORONARY ARTERY CALCIUM SCORE: ICD-10-CM

## 2025-05-21 DIAGNOSIS — R93.1 ELEVATED CORONARY ARTERY CALCIUM SCORE: Primary | ICD-10-CM

## 2025-05-21 DIAGNOSIS — I71.21 ANEURYSM OF ASCENDING AORTA WITHOUT RUPTURE (CMD): ICD-10-CM

## 2025-05-21 DIAGNOSIS — I10 ESSENTIAL HYPERTENSION: ICD-10-CM

## 2025-05-21 PROBLEM — I48.0 PAROXYSMAL ATRIAL FIBRILLATION  (CMD): Status: ACTIVE | Noted: 2025-05-21

## 2025-05-21 LAB
ATRIAL RATE (BPM): 67
CHOLEST SERPL-MCNC: 133 MG/DL
CHOLEST/HDLC SERPL: 2.4 {RATIO}
HDLC SERPL-MCNC: 55 MG/DL
LDLC SERPL CALC-MCNC: 64 MG/DL
NONHDLC SERPL-MCNC: 78 MG/DL
P AXIS (DEGREES): 61
PR-INTERVAL (MSEC): 152
QRS-INTERVAL (MSEC): 72
QT-INTERVAL (MSEC): 432
QTC: 456
R AXIS (DEGREES): 77
REPORT TEXT: NORMAL
T AXIS (DEGREES): 20
TRIGL SERPL-MCNC: 69 MG/DL
VENTRICULAR RATE EKG/MIN (BPM): 67

## 2025-05-21 SDOH — HEALTH STABILITY: PHYSICAL HEALTH: ON AVERAGE, HOW MANY DAYS PER WEEK DO YOU ENGAGE IN MODERATE TO STRENUOUS EXERCISE (LIKE A BRISK WALK)?: 3 DAYS

## 2025-05-21 SDOH — HEALTH STABILITY: PHYSICAL HEALTH: ON AVERAGE, HOW MANY MINUTES DO YOU ENGAGE IN EXERCISE AT THIS LEVEL?: 60 MIN

## 2025-05-21 ASSESSMENT — PATIENT HEALTH QUESTIONNAIRE - PHQ9
CLINICAL INTERPRETATION OF PHQ2 SCORE: NO FURTHER SCREENING NEEDED
SUM OF ALL RESPONSES TO PHQ9 QUESTIONS 1 AND 2: 0
SUM OF ALL RESPONSES TO PHQ9 QUESTIONS 1 AND 2: 0
1. LITTLE INTEREST OR PLEASURE IN DOING THINGS: NOT AT ALL
2. FEELING DOWN, DEPRESSED OR HOPELESS: NOT AT ALL

## 2025-05-22 ENCOUNTER — MED REC SCAN ONLY (OUTPATIENT)
Dept: FAMILY MEDICINE CLINIC | Facility: CLINIC | Age: 70
End: 2025-05-22

## 2025-05-27 ENCOUNTER — CLINICAL DOCUMENTATION (OUTPATIENT)
Dept: CARDIOLOGY | Age: 70
End: 2025-05-27

## 2025-05-27 ENCOUNTER — TELEPHONE (OUTPATIENT)
Dept: CARDIOLOGY | Age: 70
End: 2025-05-27

## 2025-05-27 ENCOUNTER — TELEPHONE (OUTPATIENT)
Dept: PULMONOLOGY | Facility: CLINIC | Age: 70
End: 2025-05-27

## 2025-05-29 ENCOUNTER — TELEPHONE (OUTPATIENT)
Dept: PULMONOLOGY | Facility: CLINIC | Age: 70
End: 2025-05-29

## 2025-05-29 LAB
ATRIAL RATE (BPM): 67
P AXIS (DEGREES): 61
PR-INTERVAL (MSEC): 152
QRS-INTERVAL (MSEC): 72
QT-INTERVAL (MSEC): 432
QTC: 456
R AXIS (DEGREES): 77
REPORT TEXT: NORMAL
T AXIS (DEGREES): 20
VENTRICULAR RATE EKG/MIN (BPM): 67

## 2025-05-29 RX ORDER — METOPROLOL TARTRATE 25 MG/1
25 TABLET, FILM COATED ORAL DAILY
Qty: 90 TABLET | Refills: 3 | Status: SHIPPED | OUTPATIENT
Start: 2025-05-29

## 2025-05-29 RX ORDER — DOXYCYCLINE 100 MG/1
100 CAPSULE ORAL 2 TIMES DAILY
Qty: 14 CAPSULE | Refills: 0 | Status: SHIPPED | OUTPATIENT
Start: 2025-05-29

## 2025-05-29 NOTE — TELEPHONE ENCOUNTER
Dr. Stone/Dr. Bravo-patient requesting antibiotic for worsening cough and sinus congestion. Since Sunday 5/25/2025.       Han montes  [facility] (supporting Namrata Rhoades RN)        5/29/25  7:21 AM  My symptoms are worsening, still coughing and going into my sinuses, History says I will need an antibiotic. Please advise.  XR CHEST PA + LAT CHEST (CPT=71046)

## 2025-06-06 ENCOUNTER — OFFICE VISIT (OUTPATIENT)
Dept: PULMONOLOGY | Facility: CLINIC | Age: 70
End: 2025-06-06

## 2025-06-06 VITALS
BODY MASS INDEX: 25.33 KG/M2 | SYSTOLIC BLOOD PRESSURE: 124 MMHG | OXYGEN SATURATION: 96 % | HEIGHT: 72 IN | WEIGHT: 187 LBS | HEART RATE: 84 BPM | DIASTOLIC BLOOD PRESSURE: 72 MMHG

## 2025-06-06 DIAGNOSIS — R91.8 LUNG MASS: ICD-10-CM

## 2025-06-06 DIAGNOSIS — R06.00 DYSPNEA, UNSPECIFIED TYPE: Primary | ICD-10-CM

## 2025-06-06 PROCEDURE — 99214 OFFICE O/P EST MOD 30 MIN: CPT | Performed by: INTERNAL MEDICINE

## 2025-06-06 NOTE — PROGRESS NOTES
Referring Physician  Jw Spring, DO    History of Present Illness  Patient presents today for follow-up as to pulmonary clinic.  Had recent robotic navigational bronchoscopy and biopsy of left upper lobe mass on 5/9/2025 revealing evidence of granuloma with chronic inflammation seen.  Cultures negative to date.  Recently treated with course of doxycycline after some sinus congestion with improvement.  Denies significant dyspnea symptoms at this time.  Treated for atrial fibrillation on vacation within the last month.    Medications  Medications Ordered Prior to Encounter[1]    Allergies  Allergies[2]    Physical Exam  Constitutional: no acute distress  HEENT: PERRL  Neck: supple, no JVD  Cardio: RRR, S1 S2  Respiratory: clear to auscultation bilaterally, no wheezing, rales, rhonchi, crackles  GI: abdomen soft, non tender, active bowel sounds, no organomegaly  Extremities: no clubbing, cyanosis, edema  Neurologic: no gross motor deficits  Skin: warm, dry  Lymphatic: no supraclavicular lymphadenopathy     Assessment  1.  Left upper lobe lung mass    Plan  -Patient seen today for follow-up visit after recent robotic navigational bronchoscopy performed on 5/9/2025.  Pathology with evidence of noncaseating granuloma.  All cultures including fungal and AFB negative.  CT chest on 5/7/2025 revealed 3.8 cm left upper lobe mass seen.  I explained to the patient in light of evidence of noncaseating granuloma likelihood of malignancy remains low.  Differential includes infectious process however fungal and AFB cultures negative.  Cannot rule out possibility of sarcoidosis.  Recommend close monitoring chest 2-month interval.  Patient just completed course of antibiotic therapy with doxycycline.  Previously had completed course of antibiotic therapy with Levaquin.  Recommend holding off further antibiotic or initiation of antifungal therapy at this time.  Will obtain baseline pulmonary function testing.    Brennen Bravo  DO  Pulmonary Critical Care Medicine  [facility]  6/6/2025  11:34 AM          [1]   Current Outpatient Medications on File Prior to Visit   Medication Sig Dispense Refill    doxycycline 100 MG Oral Cap Take 1 capsule (100 mg total) by mouth 2 (two) times daily. 14 capsule 0    aspirin 81 MG Oral Chew Tab Chew by mouth daily.      Echinacea 80 MG Oral Cap Take by mouth daily.      atorvastatin 10 MG Oral Tab Take 1 tablet (10 mg total) by mouth nightly.      pantoprazole 40 MG Oral Tab EC Take 1 tablet (40 mg total) by mouth 2 (two) times daily before meals. 180 tablet 3    hydroCHLOROthiazide 12.5 MG Oral Tab Take 1 tablet (12.5 mg total) by mouth in the morning.      valsartan 160 MG Oral Tab Take 1 tablet (160 mg total) by mouth in the morning.       No current facility-administered medications on file prior to visit.   [2]   Allergies  Allergen Reactions    Penicillins HIVES     Has never been in the hospital for    Lisinopril OTHER (SEE COMMENTS)     cough

## 2025-06-09 ENCOUNTER — MED REC SCAN ONLY (OUTPATIENT)
Dept: FAMILY MEDICINE CLINIC | Facility: CLINIC | Age: 70
End: 2025-06-09

## 2025-06-16 ENCOUNTER — APPOINTMENT (OUTPATIENT)
Dept: CARDIOLOGY | Age: 70
End: 2025-06-16
Attending: INTERNAL MEDICINE

## 2025-06-16 DIAGNOSIS — R93.1 ELEVATED CORONARY ARTERY CALCIUM SCORE: ICD-10-CM

## 2025-06-16 DIAGNOSIS — I10 ESSENTIAL HYPERTENSION: ICD-10-CM

## 2025-06-16 DIAGNOSIS — R00.2 PALPITATION: ICD-10-CM

## 2025-06-16 DIAGNOSIS — E78.00 PURE HYPERCHOLESTEROLEMIA: ICD-10-CM

## 2025-06-16 DIAGNOSIS — R06.09 OTHER FORM OF DYSPNEA: ICD-10-CM

## 2025-06-16 DIAGNOSIS — I71.21 ANEURYSM OF ASCENDING AORTA WITHOUT RUPTURE (CMD): ICD-10-CM

## 2025-06-16 LAB
AORTIC VALVE AREA (AVA): 0.86
ASCENDING AORTA (AAD): 3
AV PEAK GRADIENT (AVPG): 3
AV VMAX SYS DOP: 0.89
AVI LVOT PEAK GRADIENT (LVOTMG): 1.1
E WAVE DECELARATION TIME (MDT): 12.83
INTERVENTRICULAR SEPTUM IN END DIASTOLE (IVSD): 2.49
LEFT INTERNAL DIMENSION IN SYSTOLE (LVSD): 1.2
LEFT VENTRICULAR INTERNAL DIMENSION IN DIASTOLE (LVDD): 2.9
LEFT VENTRICULAR POSTERIOR WALL IN END DIASTOLE (LVPW): 4.3
LV EF 2D ECHO EST: NORMAL %
LVOT 2D (LVOTD): 17.1
MV E TISSUE VEL LAT (MELV): 0.89
MV E TISSUE VEL MED (MESV): 7.77
MV E WAVE VEL/E TISSUE VEL MED(MSR): 6.68
MV PEAK A VELOCITY (MVPAV): 142
MV PEAK E VELOCITY (MVPEV): 0.68
RV END SYSTOLIC LONGITUDINAL STRAIN FREE WALL (RVGS): 2.3
TRICUSPID VALVE ANNULAR PEAK VELOCITY (TVAPV): 12
TRICUSPID VALVE PEAK REGURGITATION VELOCITY (TRPV): 4.2
TV ESTIMATED RIGHT ARTERIAL PRESSURE (RAP): 18.3

## 2025-06-16 PROCEDURE — 93306 TTE W/DOPPLER COMPLETE: CPT | Performed by: INTERNAL MEDICINE

## 2025-06-16 PROCEDURE — 93356 MYOCRD STRAIN IMG SPCKL TRCK: CPT | Performed by: INTERNAL MEDICINE

## 2025-06-23 ENCOUNTER — HOSPITAL ENCOUNTER (OUTPATIENT)
Dept: RESPIRATORY THERAPY | Facility: HOSPITAL | Age: 70
Discharge: HOME | End: 2025-06-23
Attending: INTERNAL MEDICINE
Payer: MEDICARE

## 2025-06-23 DIAGNOSIS — R06.00 DYSPNEA, UNSPECIFIED TYPE: ICD-10-CM

## 2025-06-23 PROCEDURE — 94060 EVALUATION OF WHEEZING: CPT | Performed by: INTERNAL MEDICINE

## 2025-06-23 PROCEDURE — 94729 DIFFUSING CAPACITY: CPT | Performed by: INTERNAL MEDICINE

## 2025-06-23 PROCEDURE — 94726 PLETHYSMOGRAPHY LUNG VOLUMES: CPT | Performed by: INTERNAL MEDICINE

## 2025-06-24 ENCOUNTER — HOSPITAL ENCOUNTER (OUTPATIENT)
Age: 70
Discharge: HOME | End: 2025-06-24
Payer: MEDICARE

## 2025-06-24 VITALS
SYSTOLIC BLOOD PRESSURE: 122 MMHG | TEMPERATURE: 98 F | RESPIRATION RATE: 18 BRPM | OXYGEN SATURATION: 96 % | DIASTOLIC BLOOD PRESSURE: 75 MMHG | HEART RATE: 82 BPM

## 2025-06-24 DIAGNOSIS — H60.502 ACUTE OTITIS EXTERNA OF LEFT EAR, UNSPECIFIED TYPE: Primary | ICD-10-CM

## 2025-06-24 PROCEDURE — 99213 OFFICE O/P EST LOW 20 MIN: CPT

## 2025-06-24 RX ORDER — METRONIDAZOLE 500 MG/1
500 TABLET ORAL 3 TIMES DAILY
Qty: 21 TABLET | Refills: 0 | Status: SHIPPED | OUTPATIENT
Start: 2025-06-24 | End: 2025-07-01

## 2025-06-24 RX ORDER — CIPROFLOXACIN AND DEXAMETHASONE 3; 1 MG/ML; MG/ML
4 SUSPENSION/ DROPS AURICULAR (OTIC) 2 TIMES DAILY
Qty: 7.5 ML | Refills: 0 | Status: SHIPPED | OUTPATIENT
Start: 2025-06-24 | End: 2025-06-24

## 2025-06-24 RX ORDER — METRONIDAZOLE 500 MG/1
500 TABLET ORAL 3 TIMES DAILY
Qty: 21 TABLET | Refills: 0 | Status: SHIPPED | OUTPATIENT
Start: 2025-06-24 | End: 2025-06-24

## 2025-06-24 RX ORDER — ACETAMINOPHEN AND CODEINE PHOSPHATE 300; 30 MG/1; MG/1
1 TABLET ORAL EVERY 6 HOURS PRN
Qty: 10 TABLET | Refills: 0 | Status: SHIPPED | OUTPATIENT
Start: 2025-06-24 | End: 2025-06-24

## 2025-06-24 RX ORDER — CIPROFLOXACIN AND DEXAMETHASONE 3; 1 MG/ML; MG/ML
4 SUSPENSION/ DROPS AURICULAR (OTIC) 2 TIMES DAILY
Qty: 7.5 ML | Refills: 0 | Status: SHIPPED | OUTPATIENT
Start: 2025-06-24 | End: 2025-07-01

## 2025-06-24 RX ORDER — LEVOFLOXACIN 500 MG/1
500 TABLET, FILM COATED ORAL DAILY
Qty: 5 TABLET | Refills: 0 | Status: SHIPPED | OUTPATIENT
Start: 2025-06-24 | End: 2025-06-24

## 2025-06-24 RX ORDER — LEVOFLOXACIN 500 MG/1
500 TABLET, FILM COATED ORAL DAILY
Qty: 5 TABLET | Refills: 0 | Status: SHIPPED | OUTPATIENT
Start: 2025-06-24 | End: 2025-06-29

## 2025-06-24 RX ORDER — ACETAMINOPHEN AND CODEINE PHOSPHATE 300; 30 MG/1; MG/1
1 TABLET ORAL EVERY 6 HOURS PRN
Qty: 10 TABLET | Refills: 0 | Status: SHIPPED | OUTPATIENT
Start: 2025-06-24 | End: 2025-06-29

## 2025-06-24 NOTE — ED PROVIDER NOTES
Chief Complaint   Patient presents with    Ear Problem Pain       HPI:     Han Rangel is a 69 year old male who presents for evaluation of nontraumatic left ear pain and subjective swelling developing within the last hour, notes pain is a 7 out of 10 with ibuprofen taken prior to arrival with mild improvement.  Patient denies associated headache dizziness inner ear pain drainage neck pain chest pain shortness of breath dental pain sore throat vomiting diarrhea abdominal pain back pain upper extremity weakness numbness or swelling.      PFSH    PFSH asessment screens reviewed and agree.  Nurses notes reviewed I agree with documentation.    Family History[1]  Family history reviewed with patient/caregiver and is not pertinent to presenting problem.  Social History     Socioeconomic History    Marital status:      Spouse name: Not on file    Number of children: Not on file    Years of education: Not on file    Highest education level: Not on file   Occupational History    Not on file   Tobacco Use    Smoking status: Never    Smokeless tobacco: Never   Vaping Use    Vaping status: Never Used   Substance and Sexual Activity    Alcohol use: Yes     Alcohol/week: 3.0 standard drinks of alcohol     Types: 3 Standard drinks or equivalent per week     Comment: socially    Drug use: No    Sexual activity: Not on file   Other Topics Concern     Service Not Asked    Blood Transfusions Not Asked    Caffeine Concern Yes     Comment: coffee-2 cups/day    Occupational Exposure Not Asked    Hobby Hazards Not Asked    Sleep Concern Not Asked    Stress Concern Not Asked    Weight Concern Not Asked    Special Diet Not Asked    Back Care Not Asked    Exercise Not Asked    Bike Helmet Not Asked    Seat Belt Not Asked    Self-Exams Not Asked   Social History Narrative    Not on file     Social Drivers of Health     Food Insecurity: No Food Insecurity (2/12/2025)    Received from [facility]     Hunger Vital Sign     Worried About Running Out of Food in the Last Year: Never true     Ran Out of Food in the Last Year: Never true   Transportation Needs: No Transportation Needs (2/12/2025)    Received from [facility]    PRAPARE - Transportation     Lack of Transportation (Medical): No     Lack of Transportation (Non-Medical): No   Housing Stability: Unknown (2/12/2025)    Received from [facility]    Housing Stability Vital Sign     Unable to Pay for Housing in the Last Year: No     Number of Times Moved in the Last Year: Not on file     Homeless in the Last Year: Not on file         ROS:   Positive for stated complaint: Ear pain.  All other systems reviewed and negative except as noted above.  Constitutional and Vital Signs Reviewed.      Physical Exam:     Findings:    /75   Pulse 82   Temp 97.5 °F (36.4 °C) (Oral)   Resp 18   SpO2 96%   GENERAL: well developed, well nourished, well hydrated, no distress  SKIN: good skin turgor, no obvious rashes  NECK: supple, no adenopathy  EXTREMITIES: no cyanosis or edema. QUINTANILLA without difficulty  HEAD: normocephalic, atraumatic no angioedema.;   EYES: sclera non icteric bilateral, conjunctiva clear  EARS: Mild tenderness along the left tragus.  No localized edema, mild tenderness preauricular without palpable parotitis.  No perichondritis.  No mastoid tenderness, inner ear canals without associated effusion erythema or bulge or perforation bilaterally.  NOSE: No rhinorrhea MMM.  Nasal turbinates: pink, normal mucosa  THROAT: clear, without exudates, uvula midline, and airway patent  NEURO: No focal deficits  PSYCH: Alert and oriented x3.  Answering questions appropriately.  Mood appropriate.    MDM/Assessment/Plan:   Orders for this encounter:    Orders Placed This Encounter    acetaminophen-codeine 300-30 MG Oral Tab     Sig: Take 1 tablet by mouth every 6 (six) hours as needed for Pain.     Dispense:  10 tablet      Refill:  0     JOVITA FUNG MD - NPI #[id number]    ciprofloxacin-dexamethasone 0.3-0.1 % Otic Suspension     Sig: Place 4 drops into the left ear 2 (two) times daily for 7 days.     Dispense:  7.5 mL     Refill:  0    levoFLOXacin 500 MG Oral Tab     Sig: Take 1 tablet (500 mg total) by mouth daily for 5 days.     Dispense:  5 tablet     Refill:  0    metroNIDAZOLE 500 MG Oral Tab     Sig: Take 1 tablet (500 mg total) by mouth 3 (three) times daily for 7 days.     Dispense:  21 tablet     Refill:  0       Labs performed this visit:  No results found for this or any previous visit (from the past 10 hours).    MDM:  Patient exam suggestive of potential otitis externa based on examination versus early parotitis and salivary gland obstruction.  Patient agreeable to antibiotics with penicillin allergy for recommendation with Tylenol codeine as previously use with precaution and use a second line analgesic.  Instructed on changes warranting more emergent reevaluation versus outpatient follow-up with primary over the days ahead.  Alert nontoxic-appearing    Diagnosis:    ICD-10-CM    1. Acute otitis externa of left ear, unspecified type  H60.502 acetaminophen-codeine 300-30 MG Oral Tab     DISCONTINUED: acetaminophen-codeine 300-30 MG Oral Tab          All results reviewed and discussed with patient.  See AVS for detailed discharge instructions for your condition today.    Follow Up with:  Jw Spring DO  [address] [place] [id number]  [phone]    Schedule an appointment as soon as possible for a visit in 3 days  As needed, If symptoms worsen         [1]   Family History  Problem Relation Age of Onset    Cancer Brother         lung (smoker)    Other (Other) Brother         AAA    Other (Other) Father         AAA

## 2025-06-24 NOTE — PROCEDURES
[facility]  part of [facility]     Pulmonary Function Test     Han Rangel Patient Status:  Outpatient    1955 MRN [id number]   Date of Exam 2025 PCP Jw Spring DO           Spirometry   FEV1: 3.07 95%  FVC: 4.32 100%  FEV1/FVC: 0.71    Lung Volume   T.80 102%  RV : 3.48 127%    Diffusion Capacity   DLCO: 22.00 81%    Flow Volume Loop       Impression   No evidence of obstructive defect seen without significant postbronchodilator response observed.  Normal lung volumes with mild air trapping.  Normal diffusion capacity    Brennen Bravo DO  Pulmonary Critical Care Medicine  [facility]

## 2025-06-30 ENCOUNTER — TELEPHONE (OUTPATIENT)
Dept: CARDIOLOGY | Age: 70
End: 2025-06-30

## 2025-06-30 ENCOUNTER — APPOINTMENT (OUTPATIENT)
Dept: CARDIOLOGY | Age: 70
End: 2025-06-30

## 2025-06-30 VITALS
DIASTOLIC BLOOD PRESSURE: 78 MMHG | HEART RATE: 73 BPM | HEIGHT: 72 IN | OXYGEN SATURATION: 97 % | WEIGHT: 187.39 LBS | BODY MASS INDEX: 25.38 KG/M2 | SYSTOLIC BLOOD PRESSURE: 114 MMHG

## 2025-06-30 DIAGNOSIS — I48.0 PAROXYSMAL ATRIAL FIBRILLATION  (CMD): ICD-10-CM

## 2025-06-30 DIAGNOSIS — E78.00 PURE HYPERCHOLESTEROLEMIA: ICD-10-CM

## 2025-06-30 DIAGNOSIS — I71.21 ANEURYSM OF ASCENDING AORTA WITHOUT RUPTURE (CMD): ICD-10-CM

## 2025-06-30 DIAGNOSIS — I10 ESSENTIAL HYPERTENSION: ICD-10-CM

## 2025-06-30 DIAGNOSIS — R93.1 ELEVATED CORONARY ARTERY CALCIUM SCORE: Primary | ICD-10-CM

## 2025-06-30 DIAGNOSIS — R06.09 OTHER FORM OF DYSPNEA: ICD-10-CM

## 2025-06-30 PROCEDURE — 99214 OFFICE O/P EST MOD 30 MIN: CPT | Performed by: INTERNAL MEDICINE

## 2025-06-30 RX ORDER — ASPIRIN 81 MG/1
81 TABLET ORAL DAILY
COMMUNITY

## 2025-06-30 SDOH — HEALTH STABILITY: PHYSICAL HEALTH: ON AVERAGE, HOW MANY DAYS PER WEEK DO YOU ENGAGE IN MODERATE TO STRENUOUS EXERCISE (LIKE A BRISK WALK)?: 3 DAYS

## 2025-06-30 SDOH — HEALTH STABILITY: PHYSICAL HEALTH: ON AVERAGE, HOW MANY MINUTES DO YOU ENGAGE IN EXERCISE AT THIS LEVEL?: 90 MIN

## 2025-06-30 ASSESSMENT — PATIENT HEALTH QUESTIONNAIRE - PHQ9
2. FEELING DOWN, DEPRESSED OR HOPELESS: NOT AT ALL
SUM OF ALL RESPONSES TO PHQ9 QUESTIONS 1 AND 2: 0
SUM OF ALL RESPONSES TO PHQ9 QUESTIONS 1 AND 2: 0
1. LITTLE INTEREST OR PLEASURE IN DOING THINGS: NOT AT ALL
CLINICAL INTERPRETATION OF PHQ2 SCORE: NO FURTHER SCREENING NEEDED

## 2025-07-03 ENCOUNTER — PATIENT MESSAGE (OUTPATIENT)
Dept: PULMONOLOGY | Facility: CLINIC | Age: 70
End: 2025-07-03

## 2025-07-08 ENCOUNTER — HOSPITAL ENCOUNTER (OUTPATIENT)
Dept: CT IMAGING | Age: 70
Discharge: HOME | End: 2025-07-08
Attending: INTERNAL MEDICINE
Payer: MEDICARE

## 2025-07-08 DIAGNOSIS — R91.8 LUNG MASS: ICD-10-CM

## 2025-07-08 LAB
CREAT BLD-MCNC: 1 MG/DL (ref 0.7–1.3)
EGFRCR SERPLBLD CKD-EPI 2021: 80 ML/MIN/1.73M2 (ref 60–?)

## 2025-07-08 PROCEDURE — 71260 CT THORAX DX C+: CPT | Performed by: INTERNAL MEDICINE

## 2025-07-08 PROCEDURE — 82565 ASSAY OF CREATININE: CPT

## 2025-07-19 ENCOUNTER — E-ADVICE (OUTPATIENT)
Dept: CARDIOLOGY | Age: 70
End: 2025-07-19

## 2025-07-19 ENCOUNTER — PATIENT MESSAGE (OUTPATIENT)
Age: 70
End: 2025-07-19

## 2025-07-21 NOTE — TELEPHONE ENCOUNTER
He can make an appointment with me to review his symptoms again.  Regarding his wife looks like she needs to follow-up with infectious disease, not rheumatology

## 2025-07-22 ENCOUNTER — OFFICE VISIT (OUTPATIENT)
Dept: PULMONOLOGY | Facility: CLINIC | Age: 70
End: 2025-07-22

## 2025-07-22 VITALS
SYSTOLIC BLOOD PRESSURE: 99 MMHG | HEART RATE: 79 BPM | RESPIRATION RATE: 16 BRPM | BODY MASS INDEX: 25.19 KG/M2 | WEIGHT: 186 LBS | HEIGHT: 72 IN | OXYGEN SATURATION: 98 % | DIASTOLIC BLOOD PRESSURE: 62 MMHG

## 2025-07-22 DIAGNOSIS — R91.8 LUNG MASS: Primary | ICD-10-CM

## 2025-07-22 DIAGNOSIS — I48.0 PAROXYSMAL ATRIAL FIBRILLATION (HCC): ICD-10-CM

## 2025-07-22 PROCEDURE — 99214 OFFICE O/P EST MOD 30 MIN: CPT | Performed by: INTERNAL MEDICINE

## 2025-07-22 NOTE — PROGRESS NOTES
Referring Physician  Jw Spring DO    History of Present Illness  Patient presents today for follow-up as to pulmonary clinic.  Had recent robotic navigational bronchoscopy and biopsy of left upper lobe mass on 5/9/2025 revealing evidence of granuloma with chronic inflammation seen.  Denies significant dyspnea cough wheezing.  Denies fevers chills, weight loss.  Had recent follow-up CT chest performed    Medications  Medications Ordered Prior to Encounter[1]    Allergies  Allergies[2]    Physical Exam  Constitutional: no acute distress  HEENT: PERRL  Neck: supple, no JVD  Cardio: RRR, S1 S2  Respiratory: clear to auscultation bilaterally, no wheezing, rales, rhonchi, crackles  GI: abdomen soft, non tender, active bowel sounds, no organomegaly  Extremities: no clubbing, cyanosis, edema  Neurologic: no gross motor deficits  Skin: warm, dry  Lymphatic: no supraclavicular lymphadenopathy     Assessment  1.  Left upper lobe lung mass    Plan  -Patient seen today for follow-up visit after recent robotic navigational bronchoscopy performed on 5/9/2025.  Pathology with evidence of noncaseating granuloma.  All cultures including fungal and AFB negative.  CT chest on 5/7/2025 revealed 3.8 cm left upper lobe mass seen.  I explained to the patient in light of evidence of noncaseating granuloma likelihood of malignancy remains low.  Differential includes infectious process however fungal and AFB cultures negative.  Cannot rule out possibility of sarcoidosis.  Reviewed recent CT chest from 7/8/2025 with grossly unchanged appearance of left upper lobe mass and lingular nodule.  Discussed possibility of repeat biopsy with CT-guided approach versus close monitoring with repeat CT chest in 3 months interval.  Patient agreeable with 3-month follow-up CT chest.      Brennen Bravo DO  Pulmonary Critical Care Medicine  [facility]           [1]   Current Outpatient Medications on File Prior to Visit   Medication Sig Dispense  Refill    PANTOPRAZOLE 40 MG Oral Tab EC Take 1 tablet (40 mg total) by mouth 2 (two) times daily before meals. 180 tablet 0    doxycycline 100 MG Oral Cap Take 1 capsule (100 mg total) by mouth 2 (two) times daily. 14 capsule 0    aspirin 81 MG Oral Chew Tab Chew by mouth daily.      Echinacea 80 MG Oral Cap Take by mouth daily.      atorvastatin 10 MG Oral Tab Take 1 tablet (10 mg total) by mouth nightly.      hydroCHLOROthiazide 12.5 MG Oral Tab Take 1 tablet (12.5 mg total) by mouth in the morning.      valsartan 160 MG Oral Tab Take 1 tablet (160 mg total) by mouth in the morning.       No current facility-administered medications on file prior to visit.   [2]   Allergies  Allergen Reactions    Penicillins HIVES     Has never been in the hospital for    Lisinopril OTHER (SEE COMMENTS)     cough

## 2025-08-25 ENCOUNTER — OFFICE VISIT (OUTPATIENT)
Facility: CLINIC | Age: 70
End: 2025-08-25

## 2025-08-25 VITALS
HEIGHT: 72 IN | WEIGHT: 188 LBS | SYSTOLIC BLOOD PRESSURE: 123 MMHG | HEART RATE: 64 BPM | BODY MASS INDEX: 25.47 KG/M2 | DIASTOLIC BLOOD PRESSURE: 79 MMHG

## 2025-08-25 DIAGNOSIS — R14.2 BELCHING: ICD-10-CM

## 2025-08-25 DIAGNOSIS — Z86.0100 HISTORY OF COLON POLYPS: ICD-10-CM

## 2025-08-25 DIAGNOSIS — K21.9 GASTROESOPHAGEAL REFLUX DISEASE WITHOUT ESOPHAGITIS: Primary | ICD-10-CM

## 2025-08-25 PROCEDURE — 99213 OFFICE O/P EST LOW 20 MIN: CPT | Performed by: INTERNAL MEDICINE

## 2025-08-25 RX ORDER — PANTOPRAZOLE SODIUM 40 MG/1
40 TABLET, DELAYED RELEASE ORAL
Qty: 180 TABLET | Refills: 3 | Status: SHIPPED | OUTPATIENT
Start: 2025-08-25

## 2025-11-05 ENCOUNTER — APPOINTMENT (OUTPATIENT)
Dept: CARDIOLOGY | Age: 70
End: 2025-11-05

## (undated) DEVICE — ISPAN SF6 CONSTELLATION 125GM: Brand: ISPAN

## (undated) DEVICE — SYRINGE MED 50ML LL TIP DISP

## (undated) DEVICE — KIT CLEAN ENDOKIT 1.1OZ GOWNX2

## (undated) DEVICE — CAPTURA BRONCH BIOPSY FORCEPS NO SPIKE: Brand: CAPTURA

## (undated) DEVICE — 25+®GA HYPERVIT®  BEVEL 20K CPM TOTAL PLUS® VITRECTOMY PAK: Brand: CONSTELLATION® HYPERVIT® TOTAL PLUS®

## (undated) DEVICE — SOLUTION IRRIG 250ML 0.9% NACL

## (undated) DEVICE — 3M™ MICROFOAM™ SURGICAL TAPE, 2 INCHES X 5 YARDS, 6 ROLLS/CARTON, 6 CARTONS/CASE, 1528-2: Brand: 3M™ MICROFOAM™

## (undated) DEVICE — STERILE SURGICAL LUBRICANT, METAL TUBE: Brand: SURGILUBE

## (undated) DEVICE — 3 ML SYRINGE LUER-LOCK TIP: Brand: MONOJECT

## (undated) DEVICE — TOWEL OR BLU 16X26 STRL

## (undated) DEVICE — SOLUTION IRRIG 500ML BAL SALT 2 CHMBR GLS BSS

## (undated) DEVICE — SYRINGE MED 10ML LL TIP W/O SFTY DISP

## (undated) DEVICE — VISION PROBE ADAPTER AND SUCTION ADAPTER

## (undated) DEVICE — RETINAL: Brand: MEDLINE INDUSTRIES, INC.

## (undated) DEVICE — CATH URET CONE TIP 8FR 138008

## (undated) DEVICE — SINGLE-USE POLYPECTOMY SNARE: Brand: CAPTIFLEX

## (undated) DEVICE — 60 ML SYRINGE REGULAR TIP: Brand: MONOJECT

## (undated) DEVICE — UROLOGY DRAIN BAG

## (undated) DEVICE — 25GA SOFT TIP CANNULA: Brand: SYNERGETICS

## (undated) DEVICE — SOLUTION IRRIG 1000ML ST H2O AQUALITE PLAS

## (undated) DEVICE — Device: Brand: DUAL NARE NASAL CANNULAE FEMALE LUER CON 7FT O2 TUBE

## (undated) DEVICE — Device: Brand: ION

## (undated) DEVICE — CYSTO PACK: Brand: MEDLINE INDUSTRIES, INC.

## (undated) DEVICE — MEDI-VAC NON-CONDUCTIVE SUCTION TUBING: Brand: CARDINAL HEALTH

## (undated) DEVICE — YANKAUER,BULB TIP,W/O VENT,RIGID,STERILE: Brand: MEDLINE

## (undated) DEVICE — KIT ENDO ORCAPOD 160/180/190

## (undated) DEVICE — HERCULES 3 STAGE BALLOON ESOPHAGEAL: Brand: HERCULES

## (undated) DEVICE — SNARE OPTMZ PLPCTM TRP

## (undated) DEVICE — NITINOL STONE RETRIEVAL BASKET: Brand: ZERO TIP

## (undated) DEVICE — NEEDLE OPHTH 25GAX38MM STR TAPR RETROBLB

## (undated) DEVICE — V2 SPECIMEN COLLECTION MANIFOLD KIT: Brand: NEPTUNE

## (undated) DEVICE — REM POLYHESIVE ADULT PATIENT RETURN ELECTRODE: Brand: VALLEYLAB

## (undated) DEVICE — SINGLE USE SUCTION VALVE MAJ-209: Brand: SINGLE USE SUCTION VALVE (STERILE)

## (undated) DEVICE — APPLICATOR,COTTON-TIP,WOOD,3,STRL: Brand: MEDLINE

## (undated) DEVICE — FILTER FLUID EYE E4429-22

## (undated) DEVICE — SINGLE USE BIOPSY VALVE MAJ-210: Brand: SINGLE USE BIOPSY VALVE (STERILE)

## (undated) DEVICE — SWIVEL CONNECTOR

## (undated) DEVICE — SYRINGE TB 29G

## (undated) DEVICE — MEDI-VAC NON-CONDUCTIVE SUCTION TUBING 6MM X 1.8M (6FT.) L: Brand: CARDINAL HEALTH

## (undated) DEVICE — NITINOL WIRE ANGLED 035

## (undated) DEVICE — EYE PAK* 1030 NON-WOVEN OPHTHALMIC DRAPE INCISE POUCHES: Brand: ALCON EYE-PAK

## (undated) DEVICE — BIOPSY NEEDLE, 23G: Brand: FLEXISION

## (undated) DEVICE — SOL H2O 3000ML IRRIG

## (undated) DEVICE — ENCORE® LATEX ACCLAIM SIZE 8, STERILE LATEX POWDER-FREE SURGICAL GLOVE: Brand: ENCORE

## (undated) DEVICE — ISOVUE 300 10X100ML VIAL

## (undated) DEVICE — BALLOON DILATATION CATHETER KIT: Brand: UROMAX ULTRA KIT

## (undated) DEVICE — NITINOL WIRE STR 038

## (undated) DEVICE — SUBMUCOSAL LIFTING AGENT WITH NEEDLE.: Brand: ORISE™ GEL

## (undated) DEVICE — SOL  .9 1000ML BTL

## (undated) DEVICE — PACK SRG BIOM FULL DRP STRL

## (undated) DEVICE — ENCORE® LATEX MICRO SIZE 6.5, STERILE LATEX POWDER-FREE SURGICAL GLOVE: Brand: ENCORE

## (undated) DEVICE — CONMED SCOPE SAVER BITE BLOCK, 20X27 MM: Brand: SCOPE SAVER

## (undated) DEVICE — NITINOL WIRE ANGLED 038

## (undated) DEVICE — INTEGRATED INFLATION/LITHO DEVICE: Brand: ALLIANCE II

## (undated) DEVICE — SOLUTION PREP 30ML 5% POVIDONE IOD EYE BDINE

## (undated) DEVICE — CATHETER URETH 16FR RED RUB INTMIT ALL PURP

## (undated) DEVICE — GIJAW SINGLE-USE BIOPSY FORCEPS WITH NEEDLE: Brand: GIJAW

## (undated) DEVICE — Device: Brand: BALLOON

## (undated) DEVICE — ADAPTER BRONCHSCP 15MM FBROPT SWVL 2 AXIS

## (undated) DEVICE — SOL  .9 3000ML

## (undated) DEVICE — ENDOSCOPIC VALVE WITH ADAPTER.: Brand: SURSEAL® II

## (undated) DEVICE — SOL H2O 1000ML BTL

## (undated) NOTE — LETTER
[facility]  Administration of Anesthesia  I, Han Rangel agree to be cared for by a physician anesthesiologist alone and/or with a nurse anesthetist, who is specially trained to monitor me and give me medicine to put me to sleep or keep me comfortable during my procedure    I understand that my anesthesiologist and/or anesthetist is not an employee or agent of [facility] or [employer]. He or she works for [facility]    As the patient asking for anesthesia services, I agree to:  Allow the anesthesiologist (anesthesia doctor) to give me medicine and do additional procedures as necessary. Some examples are: Starting or using an “IV” to give me medicine, fluids or blood during my procedure, and having a breathing tube placed to help me breathe when I’m asleep (intubation). In the event that my heart stops working properly, I understand that my anesthesiologist will make every effort to sustain my life, unless otherwise directed by [facility] Do Not Resuscitate documents.  Tell my anesthesia doctor before my procedure:  If I am pregnant.  The last time that I ate or drank.  iii. All of the medicines I take (including prescriptions, herbal supplements, and pills I can buy without a prescription (including street drugs/illegal medications). Failure to inform my anesthesiologist about these medicines may increase my risk of anesthetic complications.  iv.If I am allergic to anything or have had a reaction to anesthesia before.  I understand how the anesthesia medicine will help me (benefits).  I understand that with any type of anesthesia medicine there are risks:  The most common risks are: nausea, vomiting, sore throat, muscle soreness, damage to my eyes, mouth, or teeth (from breathing tube placement).  Rare risks include: remembering what happened during my procedure, allergic reactions to medications, injury to my airway, heart, lungs, vision, nerves, or  muscles and in extremely rare instances death.  My doctor has explained to me other choices available to me for my care (alternatives).  Pregnant Patients (“epidural”):  I understand that the risks of having an epidural (medicine given into my back to help control pain during labor), include itching, low blood pressure, difficulty urinating, headache or slowing of the baby’s heart. Very rare risks include infection, bleeding, seizure, irregular heart rhythms and nerve injury.  Regional Anesthesia (“spinal”, “epidural”, & “nerve blocks”):  I understand that rare but potential complications include headache, bleeding, infection, seizure, irregular heart rhythms, and nerve injury.    _____________________________________________________________________________  Patient (or Representative) Signature/Relationship to Patient  Date   Time    _____________________________________________________________________________   Name (if used)    Language/Organization   Time    _____________________________________________________________________________  Nurse Anesthetist Signature     Date   Time  _____________________________________________________________________________  Anesthesiologist Signature     Date   Time  I have discussed the procedure and information above with the patient (or patient’s representative) and answered their questions. The patient or their representative has agreed to have anesthesia services.    _____________________________________________________________________________  Witness        Date   Time  I have verified that the signature is that of the patient or patient’s representative, and that it was signed before the procedure  Patient Name: Han Rangel     : 1955                 Printed: 2024 at 7:18 AM    Medical Record #: [id number]                                            Page 1 of 1  ----------ANESTHESIA CONSENT----------

## (undated) NOTE — LETTER
[facility]  [address], [place], [place]    Authorization for Surgical Operation and Procedure                               I hereby authorize Brennen Bravo DO, my physician and his/her assistants (if applicable), which may include medical students, residents, and/or fellows, to perform the following surgical operation/ procedure and administer such anesthesia as may be determined necessary by my physician: Operation/Procedure name (s) ROBOT-ASSISTED NAVIGATIONAL BRONCHOSCOPY on Han Rangel   2.   I recognize that during the surgical operation/procedure, unforeseen conditions may necessitate additional or different procedures than those listed above.  I, therefore, further authorize and request that the above-named surgeon, assistants, or designees perform such procedures as are, in their judgment, necessary and desirable.    3.   My surgeon/physician has discussed prior to my surgery the potential benefits, risks and side effects of this procedure; the likelihood of achieving goals; and potential problems that might occur during recuperation.  They also discussed reasonable alternatives to the procedure, including risks, benefits, and side effects related to the alternatives and risks related to not receiving this procedure.  I have had all my questions answered and I acknowledge that no guarantee has been made as to the result that may be obtained.    4.   Should the need arise during my operation/procedure, which includes change of level of care prior to discharge, I also consent to the administration of blood and/or blood products.  Further, I understand that despite careful testing and screening of blood or blood products by collecting agencies, I may still be subject to ill effects as a result of receiving a blood transfusion and/or blood products.  The following are some, but not all, of the potential risks that can occur: fever and allergic reactions, hemolytic  reactions, transmission of diseases such as Hepatitis, AIDS and Cytomegalovirus (CMV) and fluid overload.  In the event that I wish to have an autologous transfusion of my own blood, or a directed donor transfusion, I will discuss this with my physician.  Check only if Refusing Blood or Blood Products  I understand refusal of blood or blood products as deemed necessary by my physician may have serious consequences to my condition to include possible death. I hereby assume responsibility for my refusal and release the hospital, its personnel, and my physicians from any responsibility for the consequences of my refusal.    o  Refuse   5.   I authorize the use of any specimen, organs, tissues, body parts or foreign objects that may be removed from my body during the operation/procedure for diagnosis, research or teaching purposes and their subsequent disposal by hospital authorities.  I also authorize the release of specimen test results and/or written reports to my treating physician on the hospital medical staff or other referring or consulting physicians involved in my care, at the discretion of the Pathologist or my treating physician.    6.   I consent to the photographing or videotaping of the operations or procedures to be performed, including appropriate portions of my body for medical, scientific, or educational purposes, provided my identity is not revealed by the pictures or by descriptive texts accompanying them.  If the procedure has been photographed/videotaped, the surgeon will obtain the original picture, image, videotape or CD.  The hospital will not be responsible for storage, release or maintenance of the picture, image, tape or CD.    7.   I consent to the presence of a  or observers in the operating room as deemed necessary by my physician or their designees.    8.   I recognize that in the event my procedure results in extended X-Ray/fluoroscopy time, I may develop a skin  reaction.    9. If I have a Do Not Attempt Resuscitation (DNAR) order in place, that status will be suspended while in the operating room, procedural suite, and during the recovery period unless otherwise explicitly stated by me (or a person authorized to consent on my behalf). The surgeon or my attending physician will determine when the applicable recovery period ends for purposes of reinstating the DNAR order.  10. Patients having a sterilization procedure: I understand that if the procedure is successful the results will be permanent and it will therefore be impossible for me to inseminate, conceive, or bear children.  I also understand that the procedure is intended to result in sterility, although the result has not been guaranteed.   11. I acknowledge that my physician has explained sedation/analgesia administration to me including the risk and benefits I consent to the administration of sedation/analgesia as may be necessary or desirable in the judgment of my physician.    I CERTIFY THAT I HAVE READ AND FULLY UNDERSTAND THE ABOVE CONSENT TO OPERATION and/or OTHER PROCEDURE.     ____________________________________  _________________________________        ______________________________  Signature of Patient    Signature of Responsible Person                Printed Name of Responsible Person                                      ____________________________________  _____________________________                ________________________________  Signature of Witness        Date  Time         Relationship to Patient    STATEMENT OF PHYSICIAN My signature below affirms that prior to the time of the procedure; I have explained to the patient and/or his/her legal representative, the risks and benefits involved in the proposed treatment and any reasonable alternative to the proposed treatment. I have also explained the risks and benefits involved in refusal of the proposed treatment and alternatives to the proposed  treatment and have answered the patient's questions. If I have a significant financial interest in a co-management agreement or a significant financial interest in any product or implant, or other significant relationship used in this procedure/surgery, I have disclosed this and had a discussion with my patient.     _____________________________________________________              _____________________________  (Signature of Physician)                                                                                         (Date)                                   (Time)  Patient Name: Han Rangel      : 1955      Printed: 2025     Medical Record #: [id number]                                      Page 1 of 1

## (undated) NOTE — LETTER
4/26/2021              Han [address]        [phone]         Dear Flakito Santos,    This letter is to inform you that our office has made several attempts to reach you by phone without success.   We were attempting to contact yo

## (undated) NOTE — MR AVS SNAPSHOT
[facility] [address]  [phone]               Thank you for choosing us for your health care visit with SMOOTH Thompson.   We are glad to serve you and happy to provide you with this summary of your Commonly known as:  VIBRAMYCIN           ibuprofen 600 MG Tabs   Take 1 tablet by mouth 2 (two) times daily.    Commonly known as:  MOTRIN           methylPREDNISolone 4 MG Tbpk   One pack as directed   Commonly known as:  MEDROL                Where to Get

## (undated) NOTE — LETTER
10/19/2021              Partha Marshall        [address]        [place] [address]         Dear Paola Maciel,    It has come to my attention that you missed your last appointment with me.   As you know, regular medical attention is essential to maintaining y

## (undated) NOTE — LETTER
6/3/2021              Han HURT [address]        [phone]         Dear Paola Maciel,    This letter is to inform you that our office has made several attempts to reach you by phone without success.   We were attempting to contact you

## (undated) NOTE — LETTER
[facility]  Administration of Anesthesia  I, Han Rangel agree to be cared for by a physician anesthesiologist alone and/or with a nurse anesthetist, who is specially trained to monitor me and give me medicine to put me to sleep or keep me comfortable during my procedure    I understand that my anesthesiologist and/or anesthetist is not an employee or agent of [facility] or [employer]. He or she works for [facility]    As the patient asking for anesthesia services, I agree to:  Allow the anesthesiologist (anesthesia doctor) to give me medicine and do additional procedures as necessary. Some examples are: Starting or using an “IV” to give me medicine, fluids or blood during my procedure, and having a breathing tube placed to help me breathe when I’m asleep (intubation). In the event that my heart stops working properly, I understand that my anesthesiologist will make every effort to sustain my life, unless otherwise directed by [facility] Do Not Resuscitate documents.  Tell my anesthesia doctor before my procedure:  If I am pregnant.  The last time that I ate or drank.  iii. All of the medicines I take (including prescriptions, herbal supplements, and pills I can buy without a prescription (including street drugs/illegal medications). Failure to inform my anesthesiologist about these medicines may increase my risk of anesthetic complications.  iv.If I am allergic to anything or have had a reaction to anesthesia before.  I understand how the anesthesia medicine will help me (benefits).  I understand that with any type of anesthesia medicine there are risks:  The most common risks are: nausea, vomiting, sore throat, muscle soreness, damage to my eyes, mouth, or teeth (from breathing tube placement).  Rare risks include: remembering what happened during my procedure, allergic reactions to medications, injury to my airway, heart, lungs, vision, nerves, or  muscles and in extremely rare instances death.  My doctor has explained to me other choices available to me for my care (alternatives).  Pregnant Patients (“epidural”):  I understand that the risks of having an epidural (medicine given into my back to help control pain during labor), include itching, low blood pressure, difficulty urinating, headache or slowing of the baby’s heart. Very rare risks include infection, bleeding, seizure, irregular heart rhythms and nerve injury.  Regional Anesthesia (“spinal”, “epidural”, & “nerve blocks”):  I understand that rare but potential complications include headache, bleeding, infection, seizure, irregular heart rhythms, and nerve injury.    _____________________________________________________________________________  Patient (or Representative) Signature/Relationship to Patient  Date   Time    _____________________________________________________________________________   Name (if used)    Language/Organization   Time    _____________________________________________________________________________  Nurse Anesthetist Signature     Date   Time  _____________________________________________________________________________  Anesthesiologist Signature     Date   Time  I have discussed the procedure and information above with the patient (or patient’s representative) and answered their questions. The patient or their representative has agreed to have anesthesia services.    _____________________________________________________________________________  Witness        Date   Time  I have verified that the signature is that of the patient or patient’s representative, and that it was signed before the procedure  Patient Name: Han Rangel     : 1955                 Printed: 2025 at 4:32 PM    Medical Record #: [id number]                                            Page 1 of 1  ----------ANESTHESIA CONSENT----------

## (undated) NOTE — LETTER
[facility]  [address], [place], [place]  Authorization for Surgical Operation and Procedure                                                                                           I hereby authorize Bari Madera MD, my physician and his/her assistants (if applicable), which may include medical students, residents, and/or fellows, to perform the following surgical operation/ procedure and administer such anesthesia as may be determined necessary by my physician: Operation/Procedure name (s) COLONOSCOPY/ESOPHAGOGASTRODUODENOSCOPY on Han Rangel   2.   I recognize that during the surgical operation/procedure, unforeseen conditions may necessitate additional or different procedures than those listed above.  I, therefore, further authorize and request that the above-named surgeon, assistants, or designees perform such procedures as are, in their judgment, necessary and desirable.    3.   My surgeon/physician has discussed prior to my surgery the potential benefits, risks and side effects of this procedure; the likelihood of achieving goals; and potential problems that might occur during recuperation.  They also discussed reasonable alternatives to the procedure, including risks, benefits, and side effects related to the alternatives and risks related to not receiving this procedure.  I have had all my questions answered and I acknowledge that no guarantee has been made as to the result that may be obtained.    4.   Should the need arise during my operation/procedure, which includes change of level of care prior to discharge, I also consent to the administration of blood and/or blood products.  Further, I understand that despite careful testing and screening of blood or blood products by collecting agencies, I may still be subject to ill effects as a result of receiving a blood transfusion and/or blood products.  The following are some, but not all, of the potential risks that can  occur: fever and allergic reactions, hemolytic reactions, transmission of diseases such as Hepatitis, AIDS and Cytomegalovirus (CMV) and fluid overload.  In the event that I wish to have an autologous transfusion of my own blood, or a directed donor transfusion, I will discuss this with my physician.  Check only if Refusing Blood or Blood Products  I understand refusal of blood or blood products as deemed necessary by my physician may have serious consequences to my condition to include possible death. I hereby assume responsibility for my refusal and release the hospital, its personnel, and my physicians from any responsibility for the consequences of my refusal.    o  Refuse   5.   I authorize the use of any specimen, organs, tissues, body parts or foreign objects that may be removed from my body during the operation/procedure for diagnosis, research or teaching purposes and their subsequent disposal by hospital authorities.  I also authorize the release of specimen test results and/or written reports to my treating physician on the hospital medical staff or other referring or consulting physicians involved in my care, at the discretion of the Pathologist or my treating physician.    6.   I consent to the photographing or videotaping of the operations or procedures to be performed, including appropriate portions of my body for medical, scientific, or educational purposes, provided my identity is not revealed by the pictures or by descriptive texts accompanying them.  If the procedure has been photographed/videotaped, the surgeon will obtain the original picture, image, videotape or CD.  The hospital will not be responsible for storage, release or maintenance of the picture, image, tape or CD.    7.   I consent to the presence of a  or observers in the operating room as deemed necessary by my physician or their designees.    8.   I recognize that in the event my procedure results in extended  X-Ray/fluoroscopy time, I may develop a skin reaction.    9. If I have a Do Not Attempt Resuscitation (DNAR) order in place, that status will be suspended while in the operating room, procedural suite, and during the recovery period unless otherwise explicitly stated by me (or a person authorized to consent on my behalf). The surgeon or my attending physician will determine when the applicable recovery period ends for purposes of reinstating the DNAR order.  10. Patients having a sterilization procedure: I understand that if the procedure is successful the results will be permanent and it will therefore be impossible for me to inseminate, conceive, or bear children.  I also understand that the procedure is intended to result in sterility, although the result has not been guaranteed.   11. I acknowledge that my physician has explained sedation/analgesia administration to me including the risk and benefits I consent to the administration of sedation/analgesia as may be necessary or desirable in the judgment of my physician.    I CERTIFY THAT I HAVE READ AND FULLY UNDERSTAND THE ABOVE CONSENT TO OPERATION and/or OTHER PROCEDURE.     _________________________________________ _________________________________     ___________________________________  Signature of Patient     Signature of Responsible Person                   Printed Name of Responsible Person                              _________________________________________ ______________________________        ___________________________________  Signature of Witness         Date  Time         Relationship to Patient    STATEMENT OF PHYSICIAN My signature below affirms that prior to the time of the procedure; I have explained to the patient and/or his/her legal representative, the risks and benefits involved in the proposed treatment and any reasonable alternative to the proposed treatment. I have also explained the risks and benefits involved in refusal of the  proposed treatment and alternatives to the proposed treatment and have answered the patient's questions. If I have a significant financial interest in a co-management agreement or a significant financial interest in any product or implant, or other significant relationship used in this procedure/surgery, I have disclosed this and had a discussion with my patient.     _______________________________________________________________ _____________________________  (Signature of Physician)                                                                                         (Date)                                   (Time)  Patient Name: Han Rangel    : 1955   Printed: 2024      Medical Record #: [id number]                                              Page 1 of 1